# Patient Record
Sex: FEMALE | Race: WHITE | Employment: OTHER | URBAN - METROPOLITAN AREA
[De-identification: names, ages, dates, MRNs, and addresses within clinical notes are randomized per-mention and may not be internally consistent; named-entity substitution may affect disease eponyms.]

---

## 2017-03-06 PROBLEM — K86.89 PANCREATIC MASS: Status: ACTIVE | Noted: 2017-03-06

## 2017-03-23 PROCEDURE — 85025 COMPLETE CBC W/AUTO DIFF WBC: CPT | Performed by: INTERNAL MEDICINE

## 2017-03-23 PROCEDURE — 36415 COLL VENOUS BLD VENIPUNCTURE: CPT | Performed by: INTERNAL MEDICINE

## 2017-03-23 PROCEDURE — 80053 COMPREHEN METABOLIC PANEL: CPT | Performed by: INTERNAL MEDICINE

## 2017-03-23 PROCEDURE — 84443 ASSAY THYROID STIM HORMONE: CPT | Performed by: INTERNAL MEDICINE

## 2017-03-26 ENCOUNTER — ANESTHESIA EVENT (OUTPATIENT)
Dept: ENDOSCOPY | Facility: HOSPITAL | Age: 72
End: 2017-03-26
Payer: MEDICARE

## 2017-03-27 ENCOUNTER — ANESTHESIA (OUTPATIENT)
Dept: ENDOSCOPY | Facility: HOSPITAL | Age: 72
End: 2017-03-27
Payer: MEDICARE

## 2017-03-27 ENCOUNTER — SURGERY (OUTPATIENT)
Age: 72
End: 2017-03-27

## 2017-03-27 ENCOUNTER — HOSPITAL ENCOUNTER (OUTPATIENT)
Facility: HOSPITAL | Age: 72
Setting detail: HOSPITAL OUTPATIENT SURGERY
Discharge: HOME | End: 2017-03-27
Attending: INTERNAL MEDICINE | Admitting: INTERNAL MEDICINE
Payer: MEDICARE

## 2017-03-27 VITALS
RESPIRATION RATE: 16 BRPM | WEIGHT: 191 LBS | HEART RATE: 61 BPM | DIASTOLIC BLOOD PRESSURE: 62 MMHG | SYSTOLIC BLOOD PRESSURE: 99 MMHG | HEIGHT: 66.5 IN | TEMPERATURE: 98 F | OXYGEN SATURATION: 96 % | BODY MASS INDEX: 30.34 KG/M2

## 2017-03-27 DIAGNOSIS — K86.9 PANCREATIC LESION: ICD-10-CM

## 2017-03-27 PROCEDURE — 88305 TISSUE EXAM BY PATHOLOGIST: CPT | Performed by: INTERNAL MEDICINE

## 2017-03-27 PROCEDURE — 88177 CYTP FNA EVAL EA ADDL: CPT | Performed by: INTERNAL MEDICINE

## 2017-03-27 PROCEDURE — 88172 CYTP DX EVAL FNA 1ST EA SITE: CPT | Performed by: INTERNAL MEDICINE

## 2017-03-27 PROCEDURE — BD47ZZZ ULTRASONOGRAPHY OF GASTROINTESTINAL TRACT: ICD-10-PCS | Performed by: INTERNAL MEDICINE

## 2017-03-27 PROCEDURE — 88173 CYTOPATH EVAL FNA REPORT: CPT | Performed by: INTERNAL MEDICINE

## 2017-03-27 PROCEDURE — 0F9G4ZX DRAINAGE OF PANCREAS, PERCUTANEOUS ENDOSCOPIC APPROACH, DIAGNOSTIC: ICD-10-PCS | Performed by: INTERNAL MEDICINE

## 2017-03-27 RX ORDER — SODIUM CHLORIDE, SODIUM LACTATE, POTASSIUM CHLORIDE, CALCIUM CHLORIDE 600; 310; 30; 20 MG/100ML; MG/100ML; MG/100ML; MG/100ML
INJECTION, SOLUTION INTRAVENOUS CONTINUOUS
Status: DISCONTINUED | OUTPATIENT
Start: 2017-03-27 | End: 2017-03-27

## 2017-03-27 NOTE — ANESTHESIA PREPROCEDURE EVALUATION
PRE-OP EVALUATION    Patient Name: Kush Graves    Pre-op Diagnosis: Pancreatic lesion [K86.9]    Procedure(s):  ENDOSCOPIC ULTRASOUND WITH FINE NEEDLE ASPIRATION    Surgeon(s) and Role:     Daniele Sarkar MD - Primary    Pre-op vitals reviewed. HCT 42.5 03/23/2017   MCV 87.6 03/23/2017   MCH 28.7 03/23/2017   MCHC 32.7 03/23/2017   RDW 13.7 03/23/2017   .0 03/23/2017       Lab Results  Component Value Date    03/23/2017   K 4.2 03/23/2017    03/23/2017   CO2 25.0 03/23/2017

## 2017-03-27 NOTE — BRIEF OP NOTE
[facility] ENDOSCOPY  Brief Op Note     Rimma Jamse Location: OR   CSN [id number] MRN [id number]   Admission Date 3/27/2017 Operation Date 3/27/2017   Attending Physician Shirley Bates MD Operating Physician Carole Molina MD       Pre-Operativ

## 2017-03-27 NOTE — ANESTHESIA POSTPROCEDURE EVALUATION
[facility] Patient Status:  Hospital Outpatient Surgery   Age/Gender 73year old female MRN [id number]   Location [address] [place] [address]. Attending Tyron Hinds MD   Hosp Day # 0 PCP Dayana Winters MD       Anesthesia Post-op

## 2017-03-28 NOTE — OPERATIVE REPORT
[facility]    PATIENT'S NAME: Jessica Arriaga   ATTENDING PHYSICIAN: Rianna Lynne M.D. OPERATING PHYSICIAN: Rianna Lynne M.D.    PATIENT ACCOUNT#:   [de-identified]    LOCATION:  [facility] ENDO POOL ROOMS 2 EDWP 10  MEDICAL RECORD #:   [id number] mm in maximum diameter upstream to it. The mass was sampled utilizing the [employer] 22-gauge fine-needle aspiration device with the gastric wall.   Two needle passes were performed, and the preliminary results showed adequate material for histological ex

## 2017-03-29 PROCEDURE — 36415 COLL VENOUS BLD VENIPUNCTURE: CPT | Performed by: INTERNAL MEDICINE

## 2017-03-29 PROCEDURE — 82378 CARCINOEMBRYONIC ANTIGEN: CPT | Performed by: INTERNAL MEDICINE

## 2017-03-29 PROCEDURE — 86301 IMMUNOASSAY TUMOR CA 19-9: CPT | Performed by: INTERNAL MEDICINE

## 2017-04-10 PROBLEM — C25.1 MALIGNANT NEOPLASM OF BODY OF PANCREAS (HCC): Status: ACTIVE | Noted: 2017-04-10

## 2017-04-10 PROCEDURE — 36415 COLL VENOUS BLD VENIPUNCTURE: CPT | Performed by: INTERNAL MEDICINE

## 2017-04-10 PROCEDURE — 87902 NFCT AGT GNTYP ALYS HEP C: CPT | Performed by: INTERNAL MEDICINE

## 2017-04-10 PROCEDURE — 86803 HEPATITIS C AB TEST: CPT | Performed by: INTERNAL MEDICINE

## 2017-04-10 PROCEDURE — 87522 HEPATITIS C REVRS TRNSCRPJ: CPT | Performed by: INTERNAL MEDICINE

## 2017-04-11 PROBLEM — Z51.11 ENCOUNTER FOR ANTINEOPLASTIC CHEMOTHERAPY: Status: ACTIVE | Noted: 2017-04-11

## 2017-04-13 ENCOUNTER — HOSPITAL (OUTPATIENT)
Dept: OTHER | Age: 72
End: 2017-04-13
Attending: INTERNAL MEDICINE

## 2017-04-13 ENCOUNTER — HOSPITAL (OUTPATIENT)
Dept: OTHER | Age: 72
End: 2017-04-13
Attending: SURGERY

## 2017-04-13 LAB
ANALYZER ANC (IANC): NORMAL
ANION GAP SERPL CALC-SCNC: 15 MMOL/L (ref 10–20)
BASOPHILS # BLD: 0 THOUSAND/MCL (ref 0–0.3)
BASOPHILS NFR BLD: 0 %
BUN SERPL-MCNC: 15 MG/DL (ref 6–20)
BUN/CREAT SERPL: 26 (ref 7–25)
CALCIUM SERPL-MCNC: 9.1 MG/DL (ref 8.4–10.2)
CHLORIDE: 105 MMOL/L (ref 98–107)
CO2 SERPL-SCNC: 23 MMOL/L (ref 21–32)
CREAT SERPL-MCNC: 0.58 MG/DL (ref 0.51–0.95)
DIFFERENTIAL METHOD BLD: NORMAL
EOSINOPHIL # BLD: 0.2 THOUSAND/MCL (ref 0.1–0.5)
EOSINOPHIL NFR BLD: 2 %
ERYTHROCYTE [DISTWIDTH] IN BLOOD: 13.7 % (ref 11–15)
GLUCOSE BLDC GLUCOMTR-MCNC: 121 MG/DL (ref 65–99)
GLUCOSE SERPL-MCNC: 153 MG/DL (ref 65–99)
HEMATOCRIT: 41.2 % (ref 36–46.5)
HGB BLD-MCNC: 13.8 GM/DL (ref 12–15.5)
LYMPHOCYTES # BLD: 2.1 THOUSAND/MCL (ref 1–4)
LYMPHOCYTES NFR BLD: 21 %
MCH RBC QN AUTO: 28.8 PG (ref 26–34)
MCHC RBC AUTO-ENTMCNC: 33.5 GM/DL (ref 32–36.5)
MCV RBC AUTO: 85.8 FL (ref 78–100)
MONOCYTES # BLD: 0.8 THOUSAND/MCL (ref 0.3–0.9)
MONOCYTES NFR BLD: 8 %
NEUTROPHILS # BLD: 7.1 THOUSAND/MCL (ref 1.8–7.7)
NEUTROPHILS NFR BLD: 69 %
NEUTS SEG NFR BLD: NORMAL %
PERCENT NRBC: NORMAL
PLATELET # BLD: 174 THOUSAND/MCL (ref 140–450)
POTASSIUM SERPL-SCNC: 4.4 MMOL/L (ref 3.4–5.1)
RBC # BLD: 4.8 MILLION/MCL (ref 4–5.2)
SODIUM SERPL-SCNC: 139 MMOL/L (ref 135–145)
TROPONIN I SERPL HS-MCNC: 0.04 NG/ML
WBC # BLD: 10.2 THOUSAND/MCL (ref 4.2–11)

## 2017-04-14 LAB
ALBUMIN SERPL-MCNC: 3.7 GM/DL (ref 3.6–5.1)
ALP SERPL-CCNC: 68 UNIT/L (ref 45–117)
ALT SERPL-CCNC: 45 UNIT/L
AST SERPL-CCNC: 34 UNIT/L
BILIRUB CONJ SERPL-MCNC: 0.2 MG/DL (ref 0–0.2)
BILIRUB SERPL-MCNC: 0.8 MG/DL (ref 0.2–1)
PROT SERPL-MCNC: 7.1 GM/DL (ref 6.4–8.2)

## 2017-04-15 ENCOUNTER — DIAGNOSTIC TRANS (OUTPATIENT)
Dept: OTHER | Age: 72
End: 2017-04-15

## 2017-04-17 ENCOUNTER — NURSE NAVIGATOR ENCOUNTER (OUTPATIENT)
Dept: HEMATOLOGY/ONCOLOGY | Facility: HOSPITAL | Age: 72
End: 2017-04-17

## 2017-04-17 NOTE — PROGRESS NOTES
[place] called  [facility] this morning requesting consult with Dr Rajan Luther and transfer of care for newly diagnosed pancreatic cancer.   Met with [place] to review medical records and introduced myself as Nurse Navigator, explaining my rol

## 2017-04-19 ENCOUNTER — OFFICE VISIT (OUTPATIENT)
Dept: HEMATOLOGY/ONCOLOGY | Facility: HOSPITAL | Age: 72
End: 2017-04-19
Attending: INTERNAL MEDICINE
Payer: MEDICARE

## 2017-04-19 VITALS
DIASTOLIC BLOOD PRESSURE: 81 MMHG | WEIGHT: 189 LBS | HEIGHT: 66.5 IN | SYSTOLIC BLOOD PRESSURE: 121 MMHG | RESPIRATION RATE: 18 BRPM | HEART RATE: 101 BPM | BODY MASS INDEX: 30.02 KG/M2 | TEMPERATURE: 98 F

## 2017-04-19 DIAGNOSIS — C25.1 MALIGNANT NEOPLASM OF BODY OF PANCREAS (HCC): Primary | ICD-10-CM

## 2017-04-19 PROBLEM — Z51.11 CHEMOTHERAPY MANAGEMENT, ENCOUNTER FOR: Status: ACTIVE | Noted: 2017-04-19

## 2017-04-19 PROCEDURE — G0463 HOSPITAL OUTPT CLINIC VISIT: HCPCS | Performed by: INTERNAL MEDICINE

## 2017-04-19 PROCEDURE — 99205 OFFICE O/P NEW HI 60 MIN: CPT | Performed by: INTERNAL MEDICINE

## 2017-04-19 RX ORDER — FLUTICASONE PROPIONATE 50 MCG
SPRAY, SUSPENSION (ML) NASAL DAILY
COMMUNITY

## 2017-04-19 NOTE — PROGRESS NOTES
[facility] Consultation Note    Patient Name: Gera Diane   YOB: 1945   Medical Record Number: [id number]   CSN: [id number]   Consulting Physician: Juju Maddox MD  Referring Physician(s): Ravindra Alexandra  Date of Consultation: 4/19/2 pulmonary disease) ([facility] [address]      no O2   • PONV (postoperative nausea and vomiting)    • Hepatitis C    • Ill-defined condition      Lipedema; hormone dependent, treated with liposuction       Past Surgical History:      Past Surgical History    IMPLANT LEFT MG Mouth/Throat Gum Take 2 mg by mouth as needed for Smoking cessation. Disp:  Rfl:    mirtazapine 15 MG Oral Tab Take 1 tablet (15 mg total) by mouth nightly.  Disp: 90 tablet Rfl: 0   Prochlorperazine Maleate (COMPAZINE) 10 mg tablet Take 1 tablet (10 mg Resp 18  Ht 1.689 m (5' 6.5\")  Wt 85.73 kg (189 lb)  BMI 30.05 kg/m2    Physical Examination:    General: Patient is alert and oriented x 3, not in acute distress. Psych:  Calm, cooperative, appropriate questions and responses  HEENT: EOMs intact.  Adrien Cousin Chest/Abdomen/Pelvis 3/22/17  IMPRESSION:  1. No demonstration of intrathoracic metastatic disease. 2. Centrilobular emphysema. 3. Hypoattenuating mass of the pancreatic body is suspect for a neoplasm.  The pancreatic tail is  atrophic and there is mild d with neoadjuvant FOLIFINOX given every 2 weeks for a total of 4 cycles prior to restaging CT scan and hopefully surgery; followed by an additional 4 cycles of FOLFIRINOX in the adjuvant setting  -- Will plan to provide Neulasta support in light of triple c 50% of that time was spent counseling the patient and/or on coordination of care. The diagnosis, prognosis, and general treatment was explained to the patient and the family.     Josie Coto MD  [facility]  Abhi Garcia

## 2017-04-19 NOTE — PROGRESS NOTES
Initial visit. Here for treatment planning discussion. Resources from [facility] provided. Emotional support rendered. Phone list with services explained.

## 2017-04-20 ENCOUNTER — OFFICE VISIT (OUTPATIENT)
Dept: HEMATOLOGY/ONCOLOGY | Facility: HOSPITAL | Age: 72
End: 2017-04-20
Attending: PHYSICIAN ASSISTANT
Payer: MEDICARE

## 2017-04-20 DIAGNOSIS — C25.1 MALIGNANT NEOPLASM OF BODY OF PANCREAS (HCC): Primary | ICD-10-CM

## 2017-04-20 PROCEDURE — 99215 OFFICE O/P EST HI 40 MIN: CPT | Performed by: PHYSICIAN ASSISTANT

## 2017-04-20 PROCEDURE — G0463 HOSPITAL OUTPT CLINIC VISIT: HCPCS | Performed by: PHYSICIAN ASSISTANT

## 2017-04-21 NOTE — PROGRESS NOTES
Chemotherapy Education    Learner:  Patient and Family Member    Barriers / Limitations:  None    Chemotherapy education goals:  · Learn the drug names  · Administration schedule  · Routes of administration  · Treatment setting: if treatment is received in Achieved    Comments:  Discussed Neulasta and potential for bone pain. Tylenol prn and if pain uncontrolled, contact clinic.      Treatment Effects on Cardiovascular System:    Overall cardiotoxicity risk score is 0 (very low); based on this cardiac risk Shows understanding     Comments:      Patient Education Acknowledgement:  Patient states understanding of medications, treatment schedule, side effects, and self-help measures.  Patient understands that any treatment received at a facility other than the

## 2017-04-24 ENCOUNTER — OFFICE VISIT (OUTPATIENT)
Dept: HEMATOLOGY/ONCOLOGY | Facility: HOSPITAL | Age: 72
End: 2017-04-24
Attending: INTERNAL MEDICINE
Payer: MEDICARE

## 2017-04-24 VITALS
RESPIRATION RATE: 16 BRPM | SYSTOLIC BLOOD PRESSURE: 145 MMHG | HEART RATE: 103 BPM | TEMPERATURE: 98 F | DIASTOLIC BLOOD PRESSURE: 80 MMHG

## 2017-04-24 DIAGNOSIS — C25.1 MALIGNANT NEOPLASM OF BODY OF PANCREAS (HCC): Primary | ICD-10-CM

## 2017-04-24 DIAGNOSIS — Z51.11 CHEMOTHERAPY MANAGEMENT, ENCOUNTER FOR: ICD-10-CM

## 2017-04-24 PROCEDURE — 96368 THER/DIAG CONCURRENT INF: CPT

## 2017-04-24 PROCEDURE — 36593 DECLOT VASCULAR DEVICE: CPT

## 2017-04-24 PROCEDURE — 96413 CHEMO IV INFUSION 1 HR: CPT

## 2017-04-24 PROCEDURE — 96417 CHEMO IV INFUS EACH ADDL SEQ: CPT

## 2017-04-24 PROCEDURE — 96372 THER/PROPH/DIAG INJ SC/IM: CPT

## 2017-04-24 PROCEDURE — 96415 CHEMO IV INFUSION ADDL HR: CPT

## 2017-04-24 PROCEDURE — 96367 TX/PROPH/DG ADDL SEQ IV INF: CPT

## 2017-04-24 PROCEDURE — 96416 CHEMO PROLONG INFUSE W/PUMP: CPT

## 2017-04-24 PROCEDURE — 96411 CHEMO IV PUSH ADDL DRUG: CPT

## 2017-04-24 RX ORDER — ATROPINE SULFATE 0.4 MG/ML
AMPUL (ML) INJECTION
Status: COMPLETED
Start: 2017-04-24 | End: 2017-04-24

## 2017-04-24 RX ORDER — FLUOROURACIL 50 MG/ML
2400 INJECTION, SOLUTION INTRAVENOUS CONTINUOUS
Status: DISCONTINUED | OUTPATIENT
Start: 2017-04-24 | End: 2017-04-24

## 2017-04-24 RX ORDER — ATROPINE SULFATE 0.4 MG/ML
0.2 AMPUL (ML) INJECTION ONCE
Status: COMPLETED | OUTPATIENT
Start: 2017-04-24 | End: 2017-04-24

## 2017-04-24 RX ORDER — FLUOROURACIL 50 MG/ML
400 INJECTION, SOLUTION INTRAVENOUS ONCE
Status: COMPLETED | OUTPATIENT
Start: 2017-04-24 | End: 2017-04-24

## 2017-04-24 RX ORDER — 0.9 % SODIUM CHLORIDE 0.9 %
VIAL (ML) INJECTION
Status: DISCONTINUED
Start: 2017-04-24 | End: 2017-04-24

## 2017-04-24 RX ORDER — SODIUM CHLORIDE 9 MG/ML
INJECTION, SOLUTION INTRAVENOUS
Status: DISCONTINUED
Start: 2017-04-24 | End: 2017-04-24

## 2017-04-24 RX ORDER — DEXTROSE MONOHYDRATE 50 MG/ML
INJECTION, SOLUTION INTRAVENOUS
Status: DISCONTINUED
Start: 2017-04-24 | End: 2017-04-24

## 2017-04-24 RX ADMIN — ATROPINE SULFATE 0.2 MG: 0.4 MG/ML AMPUL (ML) INJECTION at 13:48:00

## 2017-04-24 RX ADMIN — FLUOROURACIL 800 MG: 50 INJECTION, SOLUTION INTRAVENOUS at 16:27:00

## 2017-04-24 RX ADMIN — FLUOROURACIL 4700 MG: 50 INJECTION, SOLUTION INTRAVENOUS at 16:34:00

## 2017-04-24 NOTE — PROGRESS NOTES
[place] to infusion today for [employer]. She arrives alert and independent, she is accompanied by her sister. Patient without complaints, denies fevers or flu-like symptoms.   Reviewed with patient all medications- their purpose, schedule/timing, an wayne. Patient also stating she feels drowsy and that her 'tongue feels thick\". Patient denies trouble swallowing, chest discomfort, or trouble breathing. Patient states it went away pretty quickly, prior to 5fu being given.   Patient watched Jimmy Tay progress    Education Record    Learner:  Patient and Family Member    Disease / Diagnosis:    Barriers / Limitations:  Fatigue and Drowsy from medication   Comments:    Method:  Discussion, Printed material and Video   Comments:    General Topics:  Infect

## 2017-04-24 NOTE — PATIENT INSTRUCTIONS
Safe Handling of Chemotherapy  While you or your family member is receiving chemotherapy, whether in the clinic or at home, the following precautions must be taken to lessen any exposure to the medication.      Safety and Handling of Chemotherapy  Home Intr possible that traces of the oral chemotherapy may be present in vaginal fluid or semen for 48 hours after taking. A condom should be used during this time.   Effective birth control should be used throughout treatment to prevent pregnancy while on these me

## 2017-04-26 ENCOUNTER — OFFICE VISIT (OUTPATIENT)
Dept: HEMATOLOGY/ONCOLOGY | Facility: HOSPITAL | Age: 72
End: 2017-04-26
Attending: PHYSICIAN ASSISTANT
Payer: MEDICARE

## 2017-04-26 ENCOUNTER — OFFICE VISIT (OUTPATIENT)
Dept: HEMATOLOGY/ONCOLOGY | Facility: HOSPITAL | Age: 72
End: 2017-04-26
Attending: INTERNAL MEDICINE
Payer: MEDICARE

## 2017-04-26 VITALS — DIASTOLIC BLOOD PRESSURE: 75 MMHG | SYSTOLIC BLOOD PRESSURE: 145 MMHG | HEART RATE: 95 BPM

## 2017-04-26 VITALS
RESPIRATION RATE: 18 BRPM | DIASTOLIC BLOOD PRESSURE: 67 MMHG | HEART RATE: 98 BPM | TEMPERATURE: 97 F | SYSTOLIC BLOOD PRESSURE: 124 MMHG

## 2017-04-26 DIAGNOSIS — C25.1 MALIGNANT NEOPLASM OF BODY OF PANCREAS (HCC): ICD-10-CM

## 2017-04-26 DIAGNOSIS — E86.0 DEHYDRATION: ICD-10-CM

## 2017-04-26 DIAGNOSIS — Z51.11 CHEMOTHERAPY MANAGEMENT, ENCOUNTER FOR: ICD-10-CM

## 2017-04-26 DIAGNOSIS — C25.1 MALIGNANT NEOPLASM OF BODY OF PANCREAS (HCC): Primary | ICD-10-CM

## 2017-04-26 DIAGNOSIS — Z51.11 CHEMOTHERAPY MANAGEMENT, ENCOUNTER FOR: Primary | ICD-10-CM

## 2017-04-26 PROCEDURE — G0463 HOSPITAL OUTPT CLINIC VISIT: HCPCS | Performed by: PHYSICIAN ASSISTANT

## 2017-04-26 PROCEDURE — 99213 OFFICE O/P EST LOW 20 MIN: CPT | Performed by: PHYSICIAN ASSISTANT

## 2017-04-26 PROCEDURE — 96372 THER/PROPH/DIAG INJ SC/IM: CPT

## 2017-04-26 PROCEDURE — 96360 HYDRATION IV INFUSION INIT: CPT

## 2017-04-26 RX ORDER — SODIUM CHLORIDE 9 MG/ML
INJECTION, SOLUTION INTRAVENOUS
Status: DISCONTINUED
Start: 2017-04-26 | End: 2017-04-26 | Stop reason: WASHOUT

## 2017-04-26 RX ORDER — 0.9 % SODIUM CHLORIDE 0.9 %
VIAL (ML) INJECTION
Status: DISCONTINUED
Start: 2017-04-26 | End: 2017-04-26

## 2017-04-26 RX ORDER — HEPARIN SODIUM (PORCINE) LOCK FLUSH IV SOLN 100 UNIT/ML 100 UNIT/ML
5 SOLUTION INTRAVENOUS ONCE
Status: COMPLETED | OUTPATIENT
Start: 2017-04-26 | End: 2017-04-26

## 2017-04-26 RX ORDER — SODIUM CHLORIDE 9 MG/ML
INJECTION, SOLUTION INTRAVENOUS
Status: DISCONTINUED
Start: 2017-04-26 | End: 2017-04-26

## 2017-04-26 RX ORDER — HEPARIN SODIUM (PORCINE) LOCK FLUSH IV SOLN 100 UNIT/ML 100 UNIT/ML
SOLUTION INTRAVENOUS
Status: COMPLETED
Start: 2017-04-26 | End: 2017-04-26

## 2017-04-26 RX ADMIN — HEPARIN SODIUM (PORCINE) LOCK FLUSH IV SOLN 100 UNIT/ML 500 UNITS: 100 SOLUTION INTRAVENOUS at 16:23:00

## 2017-04-26 NOTE — PROGRESS NOTES
Pt came here to disconnect cadd pump. cadd pump still has reservoir volume of 2 ml. Pt c/o dizziness,foggy, sleeping a lot,neuropathy symptoms to hands and throat. pt is orthostatic. Taking compazine q 6hrs. Denied any nausea,stated eating ok.    EMILIO Patel

## 2017-04-26 NOTE — PROGRESS NOTES
Patient arrives for add on hydration. Patient had FOLFIRINOX on Monday April 24, 2017. Patient states since having chemotherapy she has been exhausted, reports she was only awake for a few hours yesterday.  Patient states when she woke up she would eat and

## 2017-04-27 ENCOUNTER — HOSPITAL ENCOUNTER (INPATIENT)
Facility: HOSPITAL | Age: 72
LOS: 1 days | Discharge: HOME | DRG: 809 | End: 2017-04-28
Attending: EMERGENCY MEDICINE | Admitting: INTERNAL MEDICINE
Payer: MEDICARE

## 2017-04-27 DIAGNOSIS — C25.9 MALIGNANT NEOPLASM OF PANCREAS, UNSPECIFIED LOCATION OF MALIGNANCY (HCC): ICD-10-CM

## 2017-04-27 DIAGNOSIS — J02.9 SORE THROAT: ICD-10-CM

## 2017-04-27 DIAGNOSIS — K62.5 RECTAL BLEEDING: Primary | ICD-10-CM

## 2017-04-27 PROBLEM — E86.0 DEHYDRATION: Status: ACTIVE | Noted: 2017-04-27

## 2017-04-27 LAB
ALBUMIN SERPL BCP-MCNC: 3.6 G/DL (ref 3.5–4.8)
ALP SERPL-CCNC: 62 U/L (ref 32–100)
ALT SERPL-CCNC: 23 U/L (ref 14–54)
ANION GAP SERPL CALC-SCNC: 10 MMOL/L (ref 0–18)
APTT PPP: 21.6 SECONDS (ref 23.2–35.3)
AST SERPL-CCNC: 22 U/L (ref 15–41)
BASOPHILS # BLD: 0.1 K/UL (ref 0–0.2)
BASOPHILS NFR BLD: 0 %
BILIRUB DIRECT SERPL-MCNC: 0.2 MG/DL (ref 0–0.2)
BILIRUB SERPL-MCNC: 1.2 MG/DL (ref 0.3–1.2)
BILIRUB UR QL: NEGATIVE
BUN SERPL-MCNC: 14 MG/DL (ref 8–20)
BUN/CREAT SERPL: 17.9 (ref 10–20)
CALCIUM SERPL-MCNC: 8.9 MG/DL (ref 8.5–10.5)
CHLORIDE SERPL-SCNC: 104 MMOL/L (ref 95–110)
CLARITY UR: CLEAR
CO2 SERPL-SCNC: 22 MMOL/L (ref 22–32)
COLOR UR: YELLOW
CREAT SERPL-MCNC: 0.78 MG/DL (ref 0.5–1.5)
EOSINOPHIL # BLD: 0 K/UL (ref 0–0.7)
EOSINOPHIL NFR BLD: 0 %
ERYTHROCYTE [DISTWIDTH] IN BLOOD BY AUTOMATED COUNT: 13.4 % (ref 11–15)
GLUCOSE SERPL-MCNC: 119 MG/DL (ref 70–99)
GLUCOSE UR-MCNC: NEGATIVE MG/DL
HCT VFR BLD AUTO: 39.6 % (ref 35–48)
HGB BLD-MCNC: 13.1 G/DL (ref 12–16)
INR BLD: 1 (ref 0.9–1.2)
KETONES UR-MCNC: NEGATIVE MG/DL
LYMPHOCYTES # BLD: 1.9 K/UL (ref 1–4)
LYMPHOCYTES NFR BLD: 8 %
MCH RBC QN AUTO: 28.2 PG (ref 27–32)
MCHC RBC AUTO-ENTMCNC: 33 G/DL (ref 32–37)
MCV RBC AUTO: 85.5 FL (ref 80–100)
MONOCYTES # BLD: 0.2 K/UL (ref 0–1)
MONOCYTES NFR BLD: 1 %
NEUTROPHILS # BLD AUTO: 23 K/UL (ref 1.8–7.7)
NEUTROPHILS NFR BLD: 91 %
NITRITE UR QL STRIP.AUTO: NEGATIVE
OSMOLALITY UR CALC.SUM OF ELEC: 284 MOSM/KG (ref 275–295)
PH UR: 6 [PH] (ref 5–8)
PLATELET # BLD AUTO: 129 K/UL (ref 140–400)
PMV BLD AUTO: 10.3 FL (ref 7.4–10.3)
POTASSIUM SERPL-SCNC: 3.7 MMOL/L (ref 3.3–5.1)
PROT SERPL-MCNC: 6.2 G/DL (ref 5.9–8.4)
PROT UR-MCNC: NEGATIVE MG/DL
PROTHROMBIN TIME: 13 SECONDS (ref 11.8–14.5)
RBC # BLD AUTO: 4.64 M/UL (ref 3.7–5.4)
RBC #/AREA URNS AUTO: 1 /HPF
SODIUM SERPL-SCNC: 136 MMOL/L (ref 136–144)
SP GR UR STRIP: 1.01 (ref 1–1.03)
UROBILINOGEN UR STRIP-ACNC: <2
VIT C UR-MCNC: NEGATIVE MG/DL
WBC # BLD AUTO: 25.3 K/UL (ref 4–11)
WBC #/AREA URNS AUTO: 2 /HPF

## 2017-04-27 NOTE — PROGRESS NOTES
HPI 73year old female presents today for C1D3 FOLFIRINOX. She reports to the nurse who is removing her CADD pump that she has slept much more over the last 48 hours. She denies f/s/c/n/v/d/c. She denies abdominal pain.   She does admit to oral hyperes tablet Rfl: 3   Ondansetron HCl (ZOFRAN) 8 MG tablet Take 1 tablet (8 mg total) by mouth every 8 (eight) hours as needed for Nausea.  Disp: 20 tablet Rfl: 3   dexamethasone (DECADRON) 4 MG tablet Take 2 tabs with food in AM for 2 days following chemo, then alone in [place], [place]. 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 She enjoys dining out, swimming, enjoys yoga, spending time with friends.  [place] previously lived in [facility] full time, but now spends about 3-4 months the with Dr. Angie Hernandez prior to Cycle 2. No orders of the defined types were placed in this encounter. Results From Past 48 Hours:  No results found for this or any previous visit (from the past 48 hour(s)).     Meds This Visit:        Brian Thorpe

## 2017-04-28 VITALS
HEART RATE: 74 BPM | BODY MASS INDEX: 29.7 KG/M2 | RESPIRATION RATE: 18 BRPM | OXYGEN SATURATION: 96 % | SYSTOLIC BLOOD PRESSURE: 121 MMHG | TEMPERATURE: 98 F | HEIGHT: 67 IN | WEIGHT: 189.19 LBS | DIASTOLIC BLOOD PRESSURE: 61 MMHG

## 2017-04-28 PROBLEM — D61.818 PANCYTOPENIA (HCC): Status: ACTIVE | Noted: 2017-04-28

## 2017-04-28 PROBLEM — C25.9 MALIGNANT NEOPLASM OF PANCREAS, UNSPECIFIED LOCATION OF MALIGNANCY (HCC): Status: ACTIVE | Noted: 2017-04-28

## 2017-04-28 PROBLEM — D64.9 ANEMIA: Status: ACTIVE | Noted: 2017-04-28

## 2017-04-28 LAB
ANTIBODY SCREEN: NEGATIVE
BASOPHILS # BLD: 0 K/UL (ref 0–0.2)
BASOPHILS NFR BLD: 0 %
EOSINOPHIL # BLD: 0 K/UL (ref 0–0.7)
EOSINOPHIL NFR BLD: 0 %
ERYTHROCYTE [DISTWIDTH] IN BLOOD BY AUTOMATED COUNT: 13.8 % (ref 11–15)
HCT VFR BLD AUTO: 35.6 % (ref 35–48)
HCT VFR BLD AUTO: 36.7 % (ref 35–48)
HGB BLD-MCNC: 11.7 G/DL (ref 12–16)
HGB BLD-MCNC: 12 G/DL (ref 12–16)
LYMPHOCYTES # BLD: 3.5 K/UL (ref 1–4)
LYMPHOCYTES NFR BLD: 20 %
MCH RBC QN AUTO: 28.3 PG (ref 27–32)
MCHC RBC AUTO-ENTMCNC: 33 G/DL (ref 32–37)
MCV RBC AUTO: 85.8 FL (ref 80–100)
MONOCYTES # BLD: 0 K/UL (ref 0–1)
MONOCYTES NFR BLD: 0 %
NEUTROPHILS # BLD AUTO: 14.1 K/UL (ref 1.8–7.7)
NEUTROPHILS NFR BLD: 77 %
NEUTS BAND NFR BLD: 3 %
PLATELET # BLD AUTO: 108 K/UL (ref 140–400)
PMV BLD AUTO: 10.7 FL (ref 7.4–10.3)
RBC # BLD AUTO: 4.15 M/UL (ref 3.7–5.4)
RH BLOOD TYPE: NEGATIVE
WBC # BLD AUTO: 17.7 K/UL (ref 4–11)

## 2017-04-28 PROCEDURE — 99223 1ST HOSP IP/OBS HIGH 75: CPT | Performed by: INTERNAL MEDICINE

## 2017-04-28 RX ORDER — MIRTAZAPINE 15 MG/1
15 TABLET, FILM COATED ORAL NIGHTLY
Status: DISCONTINUED | OUTPATIENT
Start: 2017-04-28 | End: 2017-04-28

## 2017-04-28 RX ORDER — 0.9 % SODIUM CHLORIDE 0.9 %
VIAL (ML) INJECTION
Status: COMPLETED
Start: 2017-04-28 | End: 2017-04-28

## 2017-04-28 RX ORDER — SODIUM CHLORIDE 9 MG/ML
INJECTION, SOLUTION INTRAVENOUS
Status: COMPLETED
Start: 2017-04-28 | End: 2017-04-28

## 2017-04-28 RX ORDER — HEPARIN SODIUM (PORCINE) LOCK FLUSH IV SOLN 100 UNIT/ML 100 UNIT/ML
SOLUTION INTRAVENOUS
Status: DISCONTINUED
Start: 2017-04-28 | End: 2017-04-28

## 2017-04-28 RX ORDER — 0.9 % SODIUM CHLORIDE 0.9 %
VIAL (ML) INJECTION
Status: DISCONTINUED
Start: 2017-04-28 | End: 2017-04-28

## 2017-04-28 RX ORDER — ONDANSETRON 2 MG/ML
4 INJECTION INTRAMUSCULAR; INTRAVENOUS EVERY 6 HOURS PRN
Status: DISCONTINUED | OUTPATIENT
Start: 2017-04-28 | End: 2017-04-28

## 2017-04-28 RX ORDER — ALPRAZOLAM 1 MG/1
1 TABLET ORAL NIGHTLY PRN
Status: DISCONTINUED | OUTPATIENT
Start: 2017-04-28 | End: 2017-04-28

## 2017-04-28 RX ORDER — SODIUM CHLORIDE 9 MG/ML
INJECTION, SOLUTION INTRAVENOUS CONTINUOUS
Status: DISPENSED | OUTPATIENT
Start: 2017-04-28 | End: 2017-04-28

## 2017-04-28 NOTE — ED PROVIDER NOTES
Patient Seen in: [facility] 4w/sw/se    History   No chief complaint on file.     Stated Complaint: Blood in stool after chemo treatment    HPI    Patient is a 53-year-old female with known pancreatic cancer who just finished her first round of elisa polacrilex (NICORETTE STARTER KIT) 2 MG Mouth/Throat Gum,  Take 2 mg by mouth as needed for Smoking cessation. dexamethasone (DECADRON) 4 MG tablet,  Take 2 tabs with food in AM for 2 days following chemo, then as directed.        Family History   Problem Genitourinary: Rectal exam shows no mass and no tenderness. Guaiac positive stool. Gross blood on rectal exam   Musculoskeletal: Normal range of motion. Neurological: She is alert and oriented to person, place, and time. Skin: Skin is warm and dry. [id number]]                                  Final result                 Please view results for these tests on the individual orders.    BLOOD TYPE, ABO AND RH D   ANTIBODY SCREEN   RAINBOW DRAW BLUE   RAINBOW DRAW GOLD   RAINBOW DRAW LAVENDER   TORRES

## 2017-04-28 NOTE — CONSULTS
Oncology Consultation Note    Patient Name: Eusebia Lima   YOB: 1945   Medical Record Number: [id number]   CSN: [id number]   Consulting Physician: Jacky Morgan MD  Date of Consultation: 4/28/2017     Reason for Consultation:  Rectal blee History:    Social History   Marital Status:    Spouse Name: N/A    Years of Education: N/A  Number of Children: N/A     Occupational History  None on file     Social History Main Topics   Smoking status: Former Smoker  1.00 Packs/Day  For 40.00 Year comprehensive 14 point review of systems was completed. Pertinent positives and negatives noted in the the HPI.      Vital Signs:  /62 mmHg  Pulse 73  Temp(Src) 97.9 °F (36.6 °C) (Oral)  Resp 19  Ht 1.702 m (5' 7\")  Wt 85.821 kg (189 lb 3.2 oz)  BMI neulasta treatment admitted with diarrhea and hematochezia    Plan:    1.) Hematochezia    --Informed pt that her bleeding seems likely related to her diarrhea in the setting of high cell turn over rate in GI tract from chemo causing raw GI tract more pron

## 2017-04-28 NOTE — H&P
[address] [place] [address] Patient Status:  Observation    1945 MRN [id number]   Location [facility] 4W/SW/SE Attending Victorina Rodriguez MD   Hosp Day # 1 PCP Lucian Montanez MD     Date:   route daily. nicotine polacrilex (NICORETTE STARTER KIT) 2 MG Mouth/Throat Gum Take 2 mg by mouth as needed for Smoking cessation. mirtazapine 15 MG Oral Tab Take 1 tablet (15 mg total) by mouth nightly.    Prochlorperazine Maleate (COMPAZINE) 10 mg tab normocephalic, throat is clear, MARY, EOMI, clear conjunctiva   NECK: No masses   LUNGS: CTAB, no wrr   CV: RRR without murmur   ABD: Soft, nontender and not distended   EXTREMITIES: no edema   NEURO: Strength intact; no sensory deficits   RECTAL No extern

## 2017-04-28 NOTE — CONSULTS
[facility] HOSP - [facility]    Report of Consultation    [facility] Patient Status:  Observation    1945 MRN [id number]   Location [facility] 4W/SW/SE Attending Harshad Guy MD   Hosp Day # 1 PCP Ashley Trinidad MD     Date of Adm file.    Other Topics            Concern    None on file    Social History Narrative    Maxi Vines is  for the last 3 yrs; previously  27 yrs. She has no children; close to her stepdaughter in [place], [place].  Her sister, Randy Verdugo, lives in [facility] chemo, then as directed.        Allergies  No Known Allergies    Review of Systems:   GENERAL HEALTH: feels well otherwise, denies fever or weight loss  SKIN: denies any unusual skin lesions or rashes  EYES: no visual complaints or deficits  HEENT: denies m Chest CT 3/22/2017 TECHNIQUE: PA and left lateral views of the chest. FINDINGS:  There is a new left-sided portacatheter with the tip projecting over the cavoatrial junction. No pneumothorax is seen. Bilateral breast prostheses are present.   Heart size and

## 2017-04-28 NOTE — PLAN OF CARE
GASTROINTESTINAL - ADULT    • Minimal or absence of nausea and vomiting Progressing    • Maintains or returns to baseline bowel function Progressing    • Maintains adequate nutritional intake (undernourished) Progressing        Patient/Family Goals    • Pa

## 2017-05-01 ENCOUNTER — NURSE NAVIGATOR ENCOUNTER (OUTPATIENT)
Dept: HEMATOLOGY/ONCOLOGY | Facility: HOSPITAL | Age: 72
End: 2017-05-01

## 2017-05-01 ENCOUNTER — TELEPHONE (OUTPATIENT)
Dept: HEMATOLOGY/ONCOLOGY | Facility: HOSPITAL | Age: 72
End: 2017-05-01

## 2017-05-01 RX ORDER — SUCRALFATE ORAL 1 G/10ML
1 SUSPENSION ORAL
Qty: 420 ML | Refills: 3 | Status: SHIPPED | OUTPATIENT
Start: 2017-05-01 | End: 2017-08-10 | Stop reason: ALTCHOICE

## 2017-05-01 NOTE — PROGRESS NOTES
NN placed call to [place] to follow up on conversation she had with [facility]. [place] reports she has meals being delivered once a week (6 meals) and that she has many frozen and ready for use.   She is finding that she is not up to eating these meals a

## 2017-05-01 NOTE — TELEPHONE ENCOUNTER
Toxicities: C1D1 Folfirinox on 4/24/2017     Dysphagia/Stomach ache    Fever: Grade 0 (Denies c/s/f. )     Dysphagia- Pharyngeal: Grade 2 (Pt reports she still has a sore throat.  She is using cepacol throat drops that are helping.)     Anorexia: Grade 1 (P

## 2017-05-01 NOTE — TELEPHONE ENCOUNTER
Reviewed Triage RN note. Call to patient. Reports feeling weak from chemo though every day a \"little better\".  Shares that she did not realize how bad she was going to be feeling and has reached out to friends and family for assistance but still has no on

## 2017-05-01 NOTE — TELEPHONE ENCOUNTER
[place] called and said she was hospitalized because of severe bloody diarrhea. Since her discharge friday she has still been experiencing mild diarrhea, No blood and her stomach is very sore. She would like some advice.

## 2017-05-02 ENCOUNTER — HOSPITAL ENCOUNTER (INPATIENT)
Facility: HOSPITAL | Age: 72
LOS: 17 days | Discharge: NURSING, REHAB OR LONG-TERM CARE | DRG: 871 | End: 2017-05-19
Attending: INTERNAL MEDICINE | Admitting: INTERNAL MEDICINE
Payer: MEDICARE

## 2017-05-02 ENCOUNTER — OFFICE VISIT (OUTPATIENT)
Dept: HEMATOLOGY/ONCOLOGY | Facility: HOSPITAL | Age: 72
End: 2017-05-02
Attending: INTERNAL MEDICINE
Payer: MEDICARE

## 2017-05-02 ENCOUNTER — OFFICE VISIT (OUTPATIENT)
Dept: HEMATOLOGY/ONCOLOGY | Facility: HOSPITAL | Age: 72
End: 2017-05-02
Attending: PHYSICIAN ASSISTANT
Payer: MEDICARE

## 2017-05-02 ENCOUNTER — TELEPHONE (OUTPATIENT)
Dept: HEMATOLOGY/ONCOLOGY | Facility: HOSPITAL | Age: 72
End: 2017-05-02

## 2017-05-02 ENCOUNTER — APPOINTMENT (OUTPATIENT)
Dept: GENERAL RADIOLOGY | Facility: HOSPITAL | Age: 72
DRG: 871 | End: 2017-05-02
Attending: INTERNAL MEDICINE
Payer: MEDICARE

## 2017-05-02 VITALS
BODY MASS INDEX: 29.22 KG/M2 | RESPIRATION RATE: 18 BRPM | SYSTOLIC BLOOD PRESSURE: 80 MMHG | HEART RATE: 116 BPM | HEIGHT: 66.5 IN | DIASTOLIC BLOOD PRESSURE: 51 MMHG | WEIGHT: 184 LBS

## 2017-05-02 VITALS — SYSTOLIC BLOOD PRESSURE: 114 MMHG | DIASTOLIC BLOOD PRESSURE: 78 MMHG | HEART RATE: 116 BPM

## 2017-05-02 DIAGNOSIS — R11.2 NAUSEA AND VOMITING, INTRACTABILITY OF VOMITING NOT SPECIFIED, UNSPECIFIED VOMITING TYPE: ICD-10-CM

## 2017-05-02 DIAGNOSIS — K12.31 MUCOSITIS DUE TO ANTINEOPLASTIC THERAPY: ICD-10-CM

## 2017-05-02 DIAGNOSIS — R10.30 LOWER ABDOMINAL PAIN: Primary | ICD-10-CM

## 2017-05-02 DIAGNOSIS — C25.1 MALIGNANT NEOPLASM OF BODY OF PANCREAS (HCC): ICD-10-CM

## 2017-05-02 DIAGNOSIS — Z51.11 CHEMOTHERAPY MANAGEMENT, ENCOUNTER FOR: Primary | ICD-10-CM

## 2017-05-02 DIAGNOSIS — E86.0 DEHYDRATION: ICD-10-CM

## 2017-05-02 DIAGNOSIS — C25.1 MALIGNANT NEOPLASM OF BODY OF PANCREAS (HCC): Primary | ICD-10-CM

## 2017-05-02 DIAGNOSIS — D61.818 PANCYTOPENIA (HCC): ICD-10-CM

## 2017-05-02 PROBLEM — E86.1 HYPOVOLEMIA: Status: ACTIVE | Noted: 2017-05-02

## 2017-05-02 PROBLEM — R11.10 VOMITING: Status: ACTIVE | Noted: 2017-05-02

## 2017-05-02 PROBLEM — R19.7 DIARRHEA: Status: ACTIVE | Noted: 2017-05-02

## 2017-05-02 PROCEDURE — 96361 HYDRATE IV INFUSION ADD-ON: CPT

## 2017-05-02 PROCEDURE — 71010 XR CHEST AP PORTABLE  (CPT=71010): CPT

## 2017-05-02 PROCEDURE — 99215 OFFICE O/P EST HI 40 MIN: CPT | Performed by: PHYSICIAN ASSISTANT

## 2017-05-02 PROCEDURE — 85025 COMPLETE CBC W/AUTO DIFF WBC: CPT

## 2017-05-02 PROCEDURE — 80053 COMPREHEN METABOLIC PANEL: CPT

## 2017-05-02 PROCEDURE — G0463 HOSPITAL OUTPT CLINIC VISIT: HCPCS | Performed by: PHYSICIAN ASSISTANT

## 2017-05-02 PROCEDURE — 99222 1ST HOSP IP/OBS MODERATE 55: CPT | Performed by: INTERNAL MEDICINE

## 2017-05-02 PROCEDURE — 96374 THER/PROPH/DIAG INJ IV PUSH: CPT

## 2017-05-02 PROCEDURE — 83735 ASSAY OF MAGNESIUM: CPT

## 2017-05-02 RX ORDER — 0.9 % SODIUM CHLORIDE 0.9 %
VIAL (ML) INJECTION
Status: DISCONTINUED
Start: 2017-05-02 | End: 2017-05-02

## 2017-05-02 RX ORDER — HEPARIN SODIUM (PORCINE) LOCK FLUSH IV SOLN 100 UNIT/ML 100 UNIT/ML
SOLUTION INTRAVENOUS
Status: DISCONTINUED
Start: 2017-05-02 | End: 2017-05-02

## 2017-05-02 RX ORDER — HEPARIN SODIUM (PORCINE) LOCK FLUSH IV SOLN 100 UNIT/ML 100 UNIT/ML
5 SOLUTION INTRAVENOUS ONCE
Status: DISCONTINUED | OUTPATIENT
Start: 2017-05-02 | End: 2017-05-02

## 2017-05-02 RX ORDER — ALPRAZOLAM 1 MG/1
1 TABLET ORAL NIGHTLY PRN
Status: DISCONTINUED | OUTPATIENT
Start: 2017-05-02 | End: 2017-05-19

## 2017-05-02 RX ORDER — SODIUM CHLORIDE 9 MG/ML
INJECTION, SOLUTION INTRAVENOUS
Status: DISCONTINUED
Start: 2017-05-02 | End: 2017-05-02

## 2017-05-02 RX ORDER — AMLODIPINE BESYLATE 5 MG/1
5 TABLET ORAL DAILY
Status: DISCONTINUED | OUTPATIENT
Start: 2017-05-03 | End: 2017-05-03

## 2017-05-02 RX ORDER — SUCRALFATE ORAL 1 G/10ML
1 SUSPENSION ORAL
Status: DISCONTINUED | OUTPATIENT
Start: 2017-05-02 | End: 2017-05-19

## 2017-05-02 RX ORDER — ACETAMINOPHEN 325 MG/1
650 TABLET ORAL EVERY 6 HOURS PRN
Status: DISCONTINUED | OUTPATIENT
Start: 2017-05-02 | End: 2017-05-19

## 2017-05-02 RX ORDER — ONDANSETRON 2 MG/ML
4 INJECTION INTRAMUSCULAR; INTRAVENOUS EVERY 6 HOURS PRN
Status: DISCONTINUED | OUTPATIENT
Start: 2017-05-02 | End: 2017-05-19

## 2017-05-02 RX ORDER — SODIUM CHLORIDE 9 MG/ML
INJECTION, SOLUTION INTRAVENOUS CONTINUOUS
Status: DISCONTINUED | OUTPATIENT
Start: 2017-05-02 | End: 2017-05-06

## 2017-05-02 RX ORDER — MIRTAZAPINE 15 MG/1
15 TABLET, FILM COATED ORAL NIGHTLY
Status: DISCONTINUED | OUTPATIENT
Start: 2017-05-02 | End: 2017-05-19

## 2017-05-02 NOTE — TELEPHONE ENCOUNTER
Toxicities:  C1D1 Folfirinox 4/24/2017    Dysphagia/Nausea/Dehydration/Anorexia/Fatigue      Fever: Grade 0 (Denies c/s/f.  )     Fatigue: Grade 2    Dysphagia- Pharyngeal: Grade 2 (Pt reports she is still having pain in her throat and a burning pain in he

## 2017-05-02 NOTE — PROGRESS NOTES
Report received from [facility]. Patient here for hydration, awaiting bed for admission to the 4th floor. Patient has finished with her hydration. Spoke with JAQUELINE Brooks on 4se, room 451 is being cleaned for the patient. Notified CAIT Raymond.   Received ca

## 2017-05-02 NOTE — TELEPHONE ENCOUNTER
Noted and Dr Kenneth Jackson and Brunilda Elliott, navigator aware. When she comes in we will discuss mobilizing resources for home to be in place to prepare for any further treatments.

## 2017-05-02 NOTE — PROGRESS NOTES
HPI 73year old female presents today with complaints of weakness, odynophagia, dysgeusia, nausea, diarrhea, decreased oral intake and abdominal pain. She had one episode of emesis today after attempting to eat a pancake.   She took one dose of carafate volume. Negative for urgency, frequency, hematuria, flank pain and pelvic pain. Musculoskeletal: Negative for back pain, joint swelling and arthralgias. Skin: Positive for pallor. Negative for rash. Neurological: Positive for weakness.  Negative for d (postoperative nausea and vomiting)    • Hepatitis C    • Ill-defined condition      Lipedema; hormone dependent, treated with liposuction   • Cancer ([facility] [address]      pancreaticCA         Past Surgical History    IMPLANT LEFT  1970s    IMPLANT RIGHT  1970s    CO w/c, vomitus noted periorally   HENT:   Head: Atraumatic.    Right Ear: External ear normal.   Left Ear: External ear normal.   Nose: Nose normal.   Mouth/Throat: Oropharyngeal exudate (Oral mucosa dry and moderately erythematous, pathcy white exudate, jorge luis Result Value Ref Range   Glucose 145 (H) 70-99 mg/dL   Sodium 128 (L) 136-144 mmol/L   Potassium 3.6 3.3-5.1 mmol/L   Chloride 94 (L)  mmol/L   CO2 22 22-32 mmol/L   BUN 12 8-20 mg/dL   Creatinine 0.77 0.50-1.50 mg/dL   Calcium, Total 8.4 (L) 8.5-1

## 2017-05-02 NOTE — TELEPHONE ENCOUNTER
Patient feels she's dehydrated. Difficulty walking burning in stomach and incontinent.  Burning sinsation every where, fercho

## 2017-05-02 NOTE — PROGRESS NOTES
Received patient from [facility] [place]. Patient received chemotherapy on 4/24/17, states after pump was removed she started having bloody diarrhea, states she hospitalized was discharged from hospital on 4/28/17.   Patient states since she has been home s

## 2017-05-03 ENCOUNTER — APPOINTMENT (OUTPATIENT)
Dept: GENERAL RADIOLOGY | Facility: HOSPITAL | Age: 72
DRG: 871 | End: 2017-05-03
Attending: INTERNAL MEDICINE
Payer: MEDICARE

## 2017-05-03 PROBLEM — R78.81 BACTEREMIA DUE TO GRAM-NEGATIVE BACTERIA: Status: ACTIVE | Noted: 2017-05-03

## 2017-05-03 PROBLEM — A41.9 SEPSIS, UNSPECIFIED (HCC): Status: ACTIVE | Noted: 2017-05-03

## 2017-05-03 PROBLEM — R10.30 LOWER ABDOMINAL PAIN: Status: ACTIVE | Noted: 2017-05-03

## 2017-05-03 PROBLEM — A41.9 SEPSIS, UNSPECIFIED: Status: ACTIVE | Noted: 2017-05-03

## 2017-05-03 PROCEDURE — 99232 SBSQ HOSP IP/OBS MODERATE 35: CPT | Performed by: INTERNAL MEDICINE

## 2017-05-03 PROCEDURE — 74020 XR ABDOMEN, OBSTRUCTIVE SERIES (CPT=74020): CPT

## 2017-05-03 RX ORDER — 0.9 % SODIUM CHLORIDE 0.9 %
VIAL (ML) INJECTION
Status: COMPLETED
Start: 2017-05-03 | End: 2017-05-03

## 2017-05-03 RX ORDER — SODIUM CHLORIDE 9 MG/ML
INJECTION, SOLUTION INTRAVENOUS CONTINUOUS
Status: DISCONTINUED | OUTPATIENT
Start: 2017-05-03 | End: 2017-05-06

## 2017-05-03 RX ORDER — POTASSIUM CHLORIDE 29.8 MG/ML
40 INJECTION INTRAVENOUS ONCE
Status: COMPLETED | OUTPATIENT
Start: 2017-05-03 | End: 2017-05-03

## 2017-05-03 RX ORDER — POTASSIUM CHLORIDE 14.9 MG/ML
20 INJECTION INTRAVENOUS ONCE
Status: COMPLETED | OUTPATIENT
Start: 2017-05-03 | End: 2017-05-03

## 2017-05-03 RX ORDER — FLUCONAZOLE 2 MG/ML
200 INJECTION, SOLUTION INTRAVENOUS EVERY 24 HOURS
Status: DISCONTINUED | OUTPATIENT
Start: 2017-05-03 | End: 2017-05-16

## 2017-05-03 RX ORDER — MAGNESIUM SULFATE HEPTAHYDRATE 40 MG/ML
2 INJECTION, SOLUTION INTRAVENOUS ONCE
Status: COMPLETED | OUTPATIENT
Start: 2017-05-03 | End: 2017-05-03

## 2017-05-03 NOTE — PROGRESS NOTES
[facility] dosing service    Initial Pharmacokinetic Consult for Vancomycin Dosing     David Silver is a 73year old female with h/o pancreatic CA who is being treated for neutropenic fever. Pharmacy has been asked to dose Vancomycin by Dr. Gillian Cline. follow her. We appreciate the opportunity to assist in her care.     Leah Seo, PharmD  5/2/2017  9:41 PM  [address]: [phone]

## 2017-05-03 NOTE — H&P
[address] [place] [address] Patient Status:  Inpatient    1945 MRN [id number]   Location [facility] 4W/SW/SE Attending Mallika Montanez MD   Hosp Day # 1 PCP Dale Tom MD     Date:  5/3/2017 Allergies    Prescriptions prior to admission:  sucralfate (CARAFATE) 1 GM/10ML Oral Suspension Take 10 mL (1 g total) by mouth 4 (four) times daily before meals and nightly.    AMLODIPINE BESYLATE 5 MG Oral Tab TAKE 1 TABLET ONE TIME DAILY   Jon Feliz °C) (Oral)  Resp 18  SpO2 96%     GENERAL: Tired, fatigued   SKIN: no rashes   HEENT: Atraumatic, normocephalic, throat is clear, MARY, EOMI, clear conjunctiva   NECK: No masses   LUNGS: CTAB, no wrr   CV: RRR without murmur   ABD: +ttp over the lower quad normal  - cont IV fluids  - sepsis protocol ordered  - abdominal xray to rule out obstruction  - cont boost for nutrition.  Consider nutrition consult  - hold home BP meds  - oncology on consult      Janelle Allen MD  5/3/2017

## 2017-05-03 NOTE — CONSULTS
Oncology Consultation Note    Patient Name: Eusebia Lima   YOB: 1945   Medical Record Number: [id number]   CSN: [id number]   Consulting Physician: Jacky Morgan MD  Referring Physician(s): Alicia Car MD  Date of Consultation: 5/2/2017 Main Topics   Smoking status: Former Smoker  1.00 Packs/Day  For 40.00 Years     Quit date: 02/01/2017    Smokeless tobacco: Not on file    Alcohol Use: Yes  0.0 oz/week    0 Standard drinks or equivalent per week         Comment: one per month    Drug Use 94%    Physical Examination:    General: Patient is alert and oriented x 3, generalized weakness noted  Psych:  Cooperative with appropriate questions and responses  HEENT: EOMs intact. Oropharynx is clear. Neck:  No palpable lymphadenopathy.  Neck is sup counts; day 8 from treatment    Plan:    --Patient is neutropenic, but not febrile.  Agree with plans for empiric antibiotic coverage; would not plan to treat with G-CSF booster agents as she received neulasta on day 3 of treatment  --Counts currently at th

## 2017-05-03 NOTE — PROGRESS NOTES
[facility] Dosing Service  Antibiotic Dosing    Rimma James is a 73year old female with h/o pancreatic CA for whom pharmacy is dosing piperacillin/tazobactam for treatment of neutropenia. Pharmacy to dose consult requested by Dr. Bruce Cortez.     Allergie

## 2017-05-03 NOTE — PROGRESS NOTES
[facility]  Hematology/Oncology  Progress Note    Adlila August Patient Status:  Inpatient    1945 MRN [id number]   Location [facility] 4W/SW/SE Attending Harriet Lopez MD   Hosp Day # 1 PCP Hunter Rivera MD     Subjective: Hypovolemia  Impression:  72 yo F with resectable pancreatic cancer with local regional lymph node involvement [id number], stage IIB s/p cycle 1 of FOLFIRINOX on 4/24/17 with neulasta given on day 3 presents with failure to thrive at zoltan in blood counts; da

## 2017-05-03 NOTE — PLAN OF CARE
DISCHARGE PLANNING    • Discharge to home or other facility with appropriate resources Not Progressing        RISK FOR INFECTION - ADULT    • Absence of fever/infection during anticipated neutropenic period Not Progressing        SAFETY ADULT - FALL    • F

## 2017-05-03 NOTE — CDS QUERY
Clinical Significance – Hematology Results  Della Calabrese  Dear Doctor:  Clinical information (provided below) includes documentation of hematology results that are less than the normal range.  For accurate ICD-10-CM code assignm

## 2017-05-03 NOTE — PLAN OF CARE
DISCHARGE PLANNING    • Discharge to home or other facility with appropriate resources Not Progressing        GASTROINTESTINAL - ADULT    • Minimal or absence of nausea and vomiting Not Progressing    • Maintains or returns to baseline bowel function Not P

## 2017-05-03 NOTE — CONSULTS
[facility] AND [facility] Infectious Disease  Report of Consultation    [place] Patient Status:  Inpatient    1945 MRN [id number]   Location [facility] 4W/SW/SE Attending Mariann Alford MD   Hosp Day # 1 PCP Eileen Irizarry MD She does not have any smokeless tobacco history on file. She reports that she drinks alcohol. She reports that she uses illicit drugs (Cannabis).     Allergies:  No Known Allergies    Medications:    Current facility-administered medications:   •  0.9%  NaC myalgias, arthralgias, muscle weakness. Neurological: No focal neurologic deficits, seizures, tremors. Psych:  No h/o anxiety, depression, other psych d/o. Endocrine: No history of of diabetes, thyroid disorder.     Remainder of 12 point review of sys pending    Assessment and Plan:    1.  Acute gram negative sepsis with  vs. Occult GI foci considered  - Active urine sediment without significant pyuria due to neutropenia  - 1/2 blood cultures 5/2 positive for GNRs, repeats pending  - If further blood c

## 2017-05-04 ENCOUNTER — TELEPHONE (OUTPATIENT)
Dept: HEMATOLOGY/ONCOLOGY | Facility: HOSPITAL | Age: 72
End: 2017-05-04

## 2017-05-04 ENCOUNTER — APPOINTMENT (OUTPATIENT)
Dept: CT IMAGING | Facility: HOSPITAL | Age: 72
DRG: 871 | End: 2017-05-04
Attending: INTERNAL MEDICINE
Payer: MEDICARE

## 2017-05-04 PROCEDURE — 74177 CT ABD & PELVIS W/CONTRAST: CPT | Performed by: INTERNAL MEDICINE

## 2017-05-04 PROCEDURE — 99233 SBSQ HOSP IP/OBS HIGH 50: CPT | Performed by: INTERNAL MEDICINE

## 2017-05-04 RX ORDER — POTASSIUM CHLORIDE 14.9 MG/ML
20 INJECTION INTRAVENOUS ONCE
Status: COMPLETED | OUTPATIENT
Start: 2017-05-04 | End: 2017-05-04

## 2017-05-04 RX ORDER — POTASSIUM CHLORIDE 29.8 MG/ML
40 INJECTION INTRAVENOUS ONCE
Status: COMPLETED | OUTPATIENT
Start: 2017-05-04 | End: 2017-05-04

## 2017-05-04 RX ORDER — 0.9 % SODIUM CHLORIDE 0.9 %
VIAL (ML) INJECTION
Status: COMPLETED
Start: 2017-05-04 | End: 2017-05-04

## 2017-05-04 NOTE — DISCHARGE PLANNING
5/4CM-MD orders received in regards to discharge planning and caregiver. The Patient was seen at bedside. The Patient resides alone in   in [place] in a apartment building.  Prior to hospitalization, the Patient wasn't driving and was somewhat independent

## 2017-05-04 NOTE — PROGRESS NOTES
[facility]  Hematology/Oncology  Progress Note    Rimma James Patient Status:  Inpatient    1945 MRN [id number]   Location [facility] 4W/SW/SE Attending Con Mayfield MD   Hosp Day # 2 PCP Johnny Johnson MD     Subjective: institution MRI. 2.  Common bile duct dilatation without significant intrahepatic ductal dilatation.  Findings suggest age-related aeration however correlate for biliary lab abnormality. Cholelithiasis.   3.  Hepatic cysts.  Renal cysts.    4.  Fluid diste with plans for antibiotic coverage; would not plan to treat with G-CSF booster agents as she received neulasta on day 3 of treatment  --WBC counts are starting to rise, but her lowered hgb and plts are signs of her systemic infection  --Consult to social w

## 2017-05-04 NOTE — TELEPHONE ENCOUNTER
carafate denied it is not on [employer]'s preferred list. They need more information. Has she tried sucralfate tablet and it has not worked? You can call [phone] to set up a peer to peer.  Her member ID number is [id number]

## 2017-05-04 NOTE — PROGRESS NOTES
[facility]    Progress Note    Brady Zhang Patient Status:  Inpatient    1945 MRN [id number]   Location [facility] 4W/SW/SE Attending Ty Dan MD   Hosp Day # 2 PCP Dayana Winters MD       Subjective:   Pt rep 05/04/2017   CA 7.3* 05/04/2017   ALB 3.3* 05/02/2017   ALKPHO 50 05/02/2017   BILT 1.8* 05/02/2017   TP 6.4 05/02/2017   AST 17 05/02/2017   ALT 14 05/02/2017   PTT 30.5 05/03/2017   INR 1.6* 05/03/2017   TSH 1.460 03/23/2017   MG 2.2 05/04/2017       Xr

## 2017-05-04 NOTE — CM/SW NOTE
CTL met with pt at bedside. Pt is BPCI eligible under . [employer] program explained to pt who agrees to 90 day phone call follow up. [employer] and Medicare letter provided. Pt lives in a first floor condo alone.   States was very inde

## 2017-05-04 NOTE — DIETARY NOTE
ADULT NUTRITION INITIAL ASSESSMENT    Pt is at moderate nutrition risk. Pt does not meet malnutrition criteria.       RECOMMENDATIONS TO MD: DARIUS     NUTRITION DIAGNOSIS/PROBLEM:  Inadequate oral intake related to decreased ability to consume sufficient vickie kg (192 lb)      GASTROINTESTINAL PROBLEMS: dysphagia, abdominal pain, nausea, diarrhea and mouth sores    FOOD/NUTRITION RELATED HISTORY:  Appetite: Poor  Intake: <50%    Intake Meeting Needs: No    Food Allergies: No  Cultural/Ethnic/Anabaptist Preference labs WNL      DIETITIAN FOLLOW UP: RD to follow up within 7 days   [address], [address] [facility]  Ext [phone]

## 2017-05-04 NOTE — PROGRESS NOTES
120 [facility] dosing service    Follow-up Pharmacokinetic Consult for Vancomycin Dosing     Lucina Ramos is a 73year old female admitted on 5/2 who is being treated for neutropenic fever. Patient is on day 3 of Vancomycin 1 gm IV Q 12 hours.   Goal tr while on Vancomycin to assess renal function. 4.  Pharmacy will follow and monitor renal function changes, toxicity and efficacy.     Rushmore Severe, [facility]  5/4/2017  10:28 AM  [address] Extension: [phone]

## 2017-05-04 NOTE — PROGRESS NOTES
32 [facility] Infectious Disease Progress Note    Elvira Acuña Patient Status:  Inpatient    1945 MRN [id number]   Location [facility] 4W/SW/SE Attending Zane Horn MD   Hosp Day # 2 PCP Jacey Vu MD     Subjective Regular rate and rhythm. No murmur. Abdomen:  Soft, distended, +tenderness    Extremities:  No lower extremity edema noted. Without clubbing or cyanosis. Skin: Normal texture and turgor. Neurologic: Cranial nerves are grossly intact.   Motor strength

## 2017-05-04 NOTE — PLAN OF CARE
Impaired Functional Mobility    • Achieve highest/safest level of mobility/gait Not Progressing          GASTROINTESTINAL - ADULT    • Minimal or absence of nausea and vomiting Progressing    • Maintains or returns to baseline bowel function Progressing

## 2017-05-05 ENCOUNTER — APPOINTMENT (OUTPATIENT)
Dept: HEMATOLOGY/ONCOLOGY | Facility: HOSPITAL | Age: 72
End: 2017-05-05
Attending: INTERNAL MEDICINE
Payer: MEDICARE

## 2017-05-05 ENCOUNTER — APPOINTMENT (OUTPATIENT)
Dept: GENERAL RADIOLOGY | Facility: HOSPITAL | Age: 72
DRG: 871 | End: 2017-05-05
Attending: INTERNAL MEDICINE
Payer: MEDICARE

## 2017-05-05 PROBLEM — I48.92 ATRIAL FLUTTER (HCC): Status: ACTIVE | Noted: 2017-05-05

## 2017-05-05 PROCEDURE — 71010 XR CHEST AP PORTABLE  (CPT=71010): CPT | Performed by: INTERNAL MEDICINE

## 2017-05-05 PROCEDURE — 99291 CRITICAL CARE FIRST HOUR: CPT | Performed by: HOSPITALIST

## 2017-05-05 PROCEDURE — 99233 SBSQ HOSP IP/OBS HIGH 50: CPT | Performed by: INTERNAL MEDICINE

## 2017-05-05 RX ORDER — CASTOR OIL AND BALSAM, PERU 788; 87 MG/G; MG/G
OINTMENT TOPICAL 2 TIMES DAILY PRN
Status: DISCONTINUED | OUTPATIENT
Start: 2017-05-05 | End: 2017-05-19

## 2017-05-05 RX ORDER — POTASSIUM CHLORIDE 29.8 MG/ML
40 INJECTION INTRAVENOUS ONCE
Status: COMPLETED | OUTPATIENT
Start: 2017-05-05 | End: 2017-05-05

## 2017-05-05 RX ORDER — FUROSEMIDE 10 MG/ML
40 INJECTION INTRAMUSCULAR; INTRAVENOUS 3 TIMES DAILY
Status: DISCONTINUED | OUTPATIENT
Start: 2017-05-06 | End: 2017-05-08

## 2017-05-05 RX ORDER — AMIODARONE HYDROCHLORIDE 200 MG/1
400 TABLET ORAL 3 TIMES DAILY
Status: DISCONTINUED | OUTPATIENT
Start: 2017-05-05 | End: 2017-05-05

## 2017-05-05 RX ORDER — SODIUM CHLORIDE 0.9 % (FLUSH) 0.9 %
10 SYRINGE (ML) INJECTION AS NEEDED
Status: DISCONTINUED | OUTPATIENT
Start: 2017-05-05 | End: 2017-05-11

## 2017-05-05 RX ORDER — IPRATROPIUM BROMIDE AND ALBUTEROL SULFATE 2.5; .5 MG/3ML; MG/3ML
3 SOLUTION RESPIRATORY (INHALATION) ONCE
Status: COMPLETED | OUTPATIENT
Start: 2017-05-05 | End: 2017-05-05

## 2017-05-05 RX ORDER — LIDOCAINE HYDROCHLORIDE 10 MG/ML
0.5 INJECTION, SOLUTION INFILTRATION; PERINEURAL ONCE AS NEEDED
Status: ACTIVE | OUTPATIENT
Start: 2017-05-05 | End: 2017-05-05

## 2017-05-05 RX ORDER — FUROSEMIDE 10 MG/ML
20 INJECTION INTRAMUSCULAR; INTRAVENOUS ONCE
Status: COMPLETED | OUTPATIENT
Start: 2017-05-05 | End: 2017-05-05

## 2017-05-05 RX ORDER — FUROSEMIDE 10 MG/ML
20 INJECTION INTRAMUSCULAR; INTRAVENOUS DAILY
Status: DISCONTINUED | OUTPATIENT
Start: 2017-05-05 | End: 2017-05-05

## 2017-05-05 RX ORDER — DILTIAZEM HYDROCHLORIDE 5 MG/ML
10 INJECTION INTRAVENOUS ONCE
Status: COMPLETED | OUTPATIENT
Start: 2017-05-05 | End: 2017-05-05

## 2017-05-05 RX ORDER — AMIODARONE HYDROCHLORIDE 200 MG/1
400 TABLET ORAL 3 TIMES DAILY
Status: DISCONTINUED | OUTPATIENT
Start: 2017-05-05 | End: 2017-05-06

## 2017-05-05 RX ORDER — METOPROLOL TARTRATE 5 MG/5ML
5 INJECTION INTRAVENOUS ONCE
Status: COMPLETED | OUTPATIENT
Start: 2017-05-05 | End: 2017-05-05

## 2017-05-05 RX ORDER — DILTIAZEM HYDROCHLORIDE 5 MG/ML
5 INJECTION INTRAVENOUS ONCE
Status: COMPLETED | OUTPATIENT
Start: 2017-05-05 | End: 2017-05-05

## 2017-05-05 RX ORDER — IPRATROPIUM BROMIDE AND ALBUTEROL SULFATE 2.5; .5 MG/3ML; MG/3ML
3 SOLUTION RESPIRATORY (INHALATION) EVERY 6 HOURS PRN
Status: DISCONTINUED | OUTPATIENT
Start: 2017-05-05 | End: 2017-05-05

## 2017-05-05 RX ORDER — 0.9 % SODIUM CHLORIDE 0.9 %
VIAL (ML) INJECTION
Status: COMPLETED
Start: 2017-05-05 | End: 2017-05-05

## 2017-05-05 RX ORDER — POTASSIUM CHLORIDE 29.8 MG/ML
INJECTION INTRAVENOUS
Status: COMPLETED
Start: 2017-05-05 | End: 2017-05-05

## 2017-05-05 RX ORDER — POTASSIUM CHLORIDE 20 MEQ/1
40 TABLET, EXTENDED RELEASE ORAL EVERY 4 HOURS
Status: ACTIVE | OUTPATIENT
Start: 2017-05-05 | End: 2017-05-05

## 2017-05-05 RX ORDER — IPRATROPIUM BROMIDE AND ALBUTEROL SULFATE 2.5; .5 MG/3ML; MG/3ML
3 SOLUTION RESPIRATORY (INHALATION) EVERY 6 HOURS SCHEDULED
Status: DISCONTINUED | OUTPATIENT
Start: 2017-05-05 | End: 2017-05-10

## 2017-05-05 RX ORDER — METOPROLOL TARTRATE 5 MG/5ML
5 INJECTION INTRAVENOUS EVERY 6 HOURS
Status: DISCONTINUED | OUTPATIENT
Start: 2017-05-05 | End: 2017-05-07

## 2017-05-05 NOTE — PROGRESS NOTES
[facility]    Progress Note    Courtney Hensley Patient Status:  Inpatient    1945 MRN [id number]   Location [facility] 4W/SW/SE Attending Dahiana Patel MD   Hosp Day # 3 PCP Sonia Childress MD       Subjective:   Pt rep 44* 05/05/2017   CREATSERUM 0.62 05/05/2017   BUN 12 05/05/2017    05/05/2017   K 3.3 05/05/2017    05/05/2017   CO2 18* 05/05/2017   * 05/05/2017   CA 7.6* 05/05/2017   ALB 3.3* 05/02/2017   ALKPHO 50 05/02/2017   BILT 1.8* 05/02/2017 secondary compressive atelectasis however correlate for symptoms of pneumonia. 6.  Coronary artery calcifications.                Ryan Nick MD  5/5/2017      Addendum  Tachycardia  - ECG showed afib?  - tele shows aflutter - rate at 180  - start c

## 2017-05-05 NOTE — OCCUPATIONAL THERAPY NOTE
OCCUPATIONAL THERAPY EVALUATION - INPATIENT     Room Number: 451/451-A  Evaluation Date: 5/5/2017  Type of Evaluation: Initial       Physician Order: IP Consult to Occupational Therapy  Reason for Therapy: ADL/IADL Dysfunction and Discharge Planning    [facility] education;Patient/Family training (goals  5-15)         OCCUPATIONAL THERAPY MEDICAL/SOCIAL HISTORY     Problem List  Principal Problem:    Sepsis [facility])  Active Problems:    Essential hypertension    COPD (chronic obstructive pulmonary disease) ([facility] Pt appeared overwhelmed w/ therapy, then EKG staff coming in at end of session    RANGE OF MOTION   Upper extremity ROM is within functional limits     STRENGTH ASSESSMENT  Upper extremity strength is within functional limits     COORDINATION  Gross Motor: Goals  on: 5-15-17   Frequency: 3-5x/week

## 2017-05-05 NOTE — DISCHARGE PLANNING
YAMEL followed up with the pt. PT and OT are pending at this time. The pt. Is uncertain if she will be able to return home with [place] [id number] vs needing rehab. YAMEL provided the SNF list and requested a DON screen from [facility].   Tentative referral also made to [facility]

## 2017-05-05 NOTE — CONSULTS
[facility] Cardiology  Report of Consultation    Landen Kaylee Patient Status:  Inpatient    1945 MRN [id number]   Location [facility] 3W/SW Attending Felicita Collet, MD   Hosp Day # 3 PCP [address] Estevez MD     Date of Admission:  2017 aesthetics     TONSILLECTOMY  1949    Comment      Family History of Premature CAD: No   Social History:    Smoking Status: Former Smoker                   Packs/Day: 1.00  Years: 36        Quit date: 02/01/2017    Alcohol Use: Yes           0.0 oz/week Gross per 24 hour   Intake   2868 ml   Output      0 ml   Net   2868 ml       Hemodynamic parameters (last 24 hours):      CONS: well developed, well nourished. HEAD/FACE:no trauma, normocephalic. EYES:conjunctiva not injected, no xanthelasma.  ENT:mucosa Abdomen+pelvis(contrast Only)(cpt=74177)    5/4/2017  CONCLUSION:  1. In the pancreatic body there is a nonspecific hypodense region measuring 20 x 32 mm. There is mild fat stranding surrounding this finding.   The pancreatic duct within this region is no

## 2017-05-05 NOTE — PROGRESS NOTES
[facility]  Hematology/Oncology  Progress Note    Landen Page Patient Status:  Inpatient    1945 MRN [id number]   Location [facility] 4W/SW/SE Attending Felicita Collet, MD   Hosp Day # 3 [address] Estevez MD     Subjective: seen/effaced and distally the pancreatic duct is    dilated.  This may represent localized edema of the pancreas as seen with acute pancreatitis, however suspicious for pancreatic tumor (possibly adenocarcinoma), when considering the appearance of the panc local regional lymph node involvement [id number], stage IIB s/p cycle 1 of FOLFIRINOX on 4/24/17 with neulasta given on day 3 presented with failure to thrive at zoltan in blood counts; on day 8 from treatment. Patient has evidence of E. Coli by blood cultures.   [address], [facility]

## 2017-05-05 NOTE — PROGRESS NOTES
05/05/17 1508   Clinical Encounter Type   Visited With Patient; Family   Routine Visit Introduction   Continue Visiting Yes   Crisis Visit Critical care  (RRT)   Chp responded to RRT, assisted family with transition to CCU and kept pt informed.

## 2017-05-05 NOTE — PHYSICAL THERAPY NOTE
PHYSICAL THERAPY EVALUATION - INPATIENT     Room Number: 451/451-A  Evaluation Date: 5/5/2017  Type of Evaluation: Initial  Physician Order: PT Eval and Treat    Presenting Problem: Failure to Thrive  Reason for Therapy: Mobility Dysfunction and Discha the lower quadrants.      Problem List  Principal Problem:    Sepsis ([facility]  Active Problems:    Essential hypertension    COPD (chronic obstructive pulmonary disease) (HCC)    Malignant neoplasm of body of pancreas (HCC)    Pancytopenia (HCC)    Diarrhea extremity ROM is within functional limits     Lower extremity strength is grossly graded 4/5  BALANCE  Static Sitting: Good  Dynamic Sitting: Good  Static Standing: Fair -  Dynamic Standing: Poor +    ACTIVITY TOLERANCE  O2 Saturation: 95%  O2 Device: Vox[employer] session/findings    CURRENT GOALS    Goals to be met by: 5/12/17  Patient Goal Patient's self-stated goal is: get stronger and go home   Goal #1 Patient is able to demonstrate supine - sit EOB @ level: modified independent     Goal #1   Current Status    G

## 2017-05-05 NOTE — PROGRESS NOTES
Pt started on telemetry per order. Call from telemetry stating pt in Atrial flutter sustaining 180s-200s. Dr. Sarah Hinds aware. Cardizem drip and transfer order placed. Pt with no s/s of distress. Explained the plan to the pt. Report given to [facility] on 3CV.  Pt

## 2017-05-05 NOTE — PROGRESS NOTES
Pt received from 4th floor pt hr on tgele was 199-200 pt started on diltiazem drip with 5 mg bolus prootocol followed.   Pt hr remained sustained in 190-200 dr. Arsen Hudson notiified  Pt asympotmatic during that time bp upon starting drip was spbp in 100 after

## 2017-05-05 NOTE — PROGRESS NOTES
Rapid response note     RRT called by RN as patient was sustaining heart rates from 180s to 200. She was asymptomatic. Denied dizziness, lightheadedness, chest pain, palpitations. No worsening sob. No fevers/chills. No n/v/abd pain.   She was started o

## 2017-05-05 NOTE — PROGRESS NOTES
[address] is a 73year old female. No chief complaint on file. HPI:    Abdominal pain    REVIEW OF SYSTEMS:   A comprehensive 11 point review of systems was completed. Pertinent positives and negatives noted in the the HPI.     Allergies:  No Spoke with pt.               Lab Results  Component Value Date   WBC 2.2 05/05/2017   WBC 2.2 05/05/2017   HGB 9.4 05/05/2017   HGB 9.3 05/05/2017   HCT 27.8 05/05/2017   HCT 27.9 05/05/2017   PLT 42 05/05/2017   PLT 44 05/05/2017   CREATSERUM 0.62 05/05/20

## 2017-05-06 PROCEDURE — 99232 SBSQ HOSP IP/OBS MODERATE 35: CPT | Performed by: INTERNAL MEDICINE

## 2017-05-06 RX ORDER — SODIUM CHLORIDE 9 MG/ML
INJECTION, SOLUTION INTRAVENOUS CONTINUOUS
Status: DISCONTINUED | OUTPATIENT
Start: 2017-05-05 | End: 2017-05-06

## 2017-05-06 RX ORDER — POTASSIUM CHLORIDE 20 MEQ/1
40 TABLET, EXTENDED RELEASE ORAL
Status: DISPENSED | OUTPATIENT
Start: 2017-05-06 | End: 2017-05-07

## 2017-05-06 RX ORDER — POTASSIUM CHLORIDE 14.9 MG/ML
20 INJECTION INTRAVENOUS ONCE
Status: COMPLETED | OUTPATIENT
Start: 2017-05-06 | End: 2017-05-06

## 2017-05-06 RX ORDER — POTASSIUM CHLORIDE 29.8 MG/ML
40 INJECTION INTRAVENOUS ONCE
Status: COMPLETED | OUTPATIENT
Start: 2017-05-06 | End: 2017-05-06

## 2017-05-06 RX ORDER — DILTIAZEM HYDROCHLORIDE 5 MG/ML
10 INJECTION INTRAVENOUS ONCE
Status: COMPLETED | OUTPATIENT
Start: 2017-05-06 | End: 2017-05-06

## 2017-05-06 RX ORDER — AMIODARONE HYDROCHLORIDE 200 MG/1
200 TABLET ORAL
Status: DISCONTINUED | OUTPATIENT
Start: 2017-05-06 | End: 2017-05-08

## 2017-05-06 RX ORDER — MAGNESIUM SULFATE HEPTAHYDRATE 40 MG/ML
2 INJECTION, SOLUTION INTRAVENOUS ONCE
Status: COMPLETED | OUTPATIENT
Start: 2017-05-06 | End: 2017-05-06

## 2017-05-06 RX ORDER — POTASSIUM CHLORIDE 29.8 MG/ML
INJECTION INTRAVENOUS
Status: COMPLETED
Start: 2017-05-06 | End: 2017-05-06

## 2017-05-06 NOTE — PROGRESS NOTES
32 [facility] Infectious Disease Progress Note    Eusebia Lima Patient Status:  Inpatient    1945 MRN [id number]   Location [facility] 4W/SW/SE Attending Aylin Luis MD   Hosp Day # 4 PCP Mara Mcdaniel MD     Subjective (CARAFATE) 1 GM/10ML suspension 1 g, 1 g, Oral, TID AC and HS  •  acetaminophen (TYLENOL) tab 650 mg, 650 mg, Oral, Q6H PRN  •  Piperacillin Sod-Tazobactam So (ZOSYN) 3.375 g in dextrose 5 % 100 mL IVPB, 3.375 g, Intravenous, Q8H    Physical Exam:  General cycle of FOLFIRINOX on 4/24  4. Mucositis  -on IV fluconazole and nystatin  5.   Diarrhea  -has not had BM since admission  -CT with colitis  -start PO vancomcyin  -check stool when possible      If you have any questions or concerns please call [facility] at

## 2017-05-06 NOTE — PROGRESS NOTES
[facility]    Progress Note    Rimma James Patient Status:  Inpatient    1945 MRN [id number]   Location [facility] 2W/SW Attending Con Mayfield MD   Hosp Day # 4 PCP Johnny Johnson MD     Subjective:     Constit depressed.        Assessment and Plan:       Malignant neoplasm of body of pancreas [facility])  Patient has clinical stage T2 N1 M0 adenocarcinoma of the pacreas  She is s/p one cycle of FOLFIRINOX on 4/24/17 with neulasta on day 3      Pancytopenia ([address]  Count

## 2017-05-06 NOTE — PROGRESS NOTES
Night MD - CCU    Impressions:  ---AF/RVR with rate still high 130+ after IV Cardizem (increased to 25 mg/hr now) + IV Lopressor 5 mg recently, and 1st dose PO amiodarone 400 mg given this PM; also given IV Lasix (for tachypnea, dyspnea, wheezes not cleari spite of Cardizem 25 mg/hour, Lopressor IV 5 mg x 2 doses (widely spaced) and a first dose of PO amiodarone 400 mg given tonight. She had also been given a nebulizer tx and was given 20 mg IV Lasix before I saw her.   By the time I arrived to see her, she 3. 70-5.40 M/UL   HGB 9.4 (L) 12.0-16.0 g/dL   HCT 27.2 (L) 35.0-48.0 %   MCV 83.6 80.0-100.0 fL   MCH 28.8 27.0-32.0 pg   MCHC 34.4 32.0-37.0 g/dl   RDW 12.7 11.0-15.0 %   PLT 24 (LL) 140-400 K/UL   MPV 10.9 (H) 7.4-10.3 fL   Neutrophil % 1 %   Lymphocyte American >60 >=60   GFR, -American >60 >=60   -CBC, PLATELET; NO DIFFERENTIAL   Collection Time: 05/04/17  5:56 AM   Result Value Ref Range   WBC 0.7 (LL) 4.0-11.0 K/UL   RBC 2.80 (L) 3.70-5.40 M/UL   HGB 8.0 (L) 12.0-16.0 g/dL   HCT 23.7 (L) 35.0-4 HORMONE   Collection Time: 05/04/17  9:18 AM   Result Value Ref Range   TSH 0.18 (L) 0.45-5.33 uIU/mL   -FREE T4 (FREE THYROXINE)   Collection Time: 05/04/17  9:18 AM   Result Value Ref Range   Free T4 1.22 0.58-1.64 ng/dL   -BLOOD CULTURE   Collection Darcy Aguilar 1.8-7.7 K/UL   Lymphocyte Absolute 1.1 1.0-4.0 K/UL   Monocyte Absolute 0.2 0.0-1.0 K/UL   Eosinophil Absolute 0.0 0.0-0.7 K/UL   Basophil Absolute 0.0 0.0-0.2 K/UL   Toxic Granulation Few (A)    -MAGNESIUM   Collection Time: 05/05/17  4:59 AM   Result Leigha

## 2017-05-06 NOTE — PROGRESS NOTES
[facility] Cardiology Progress Note - Wanda Louder Patient Status:  Inpatient    1945 MRN [id number]   Location [facility] 2W/SW Attending Malina Yates MD   Hosp Day # 4 PCP Karin Canela MD     Subjective: abdominal pain     Pancytopenia due to chemotherapy [facility])     Atrial flutter (HCC)     Sepsis ([facility]      Objective:   Temp: 97.3 °F (36.3 °C)  Pulse: 120  Resp: 27  BP: 104/70 mmHg    Intake/Output:     Intake/Output Summary (Last 24 hours) at 05/06/17 092 18*   --   19*  22   BUN  12   --   11   --   12   --   13  13   CREATSERUM  0.78   --   0.74   --   0.62   --   0.70  0.62   CA  7.0*   --   7.3*   --   7.6*   --   7.6*  7.5*   MG   --    --   2.2   --   2.2   --    --   1.6*   GLU  123*   --   147*   - (ZOSYN) 3.375 g in dextrose 5 % 100 mL IVPB 3.375 g Intravenous Jarrett Doe MD  5/6/2017  9:21 AM

## 2017-05-06 NOTE — PROGRESS NOTES
[facility]    Progress Note    Amanuel Sheehan Patient Status:  Inpatient    1945 MRN [id number]   Location [facility] 2W/SW Attending Trevor Lopez MD   Hosp Day # 4 PCP Sherryle Simmering, MD     Subjective:     Constit Abdomen+pelvis(contrast Only)(cpt=74177)    5/4/2017  CONCLUSION:  1. In the pancreatic body there is a nonspecific hypodense region measuring 20 x 32 mm. There is mild fat stranding surrounding this finding.   The pancreatic duct within this region is no ABNORMAL No previous ECGs available Electronically signed on 05/05/2017 at 12:54 by Cecile Bernheim.       Assessment and Plan:   Principal Problem:    Sepsis(995.91)  Active Problems:    Essential hypertension    COPD (chronic obstructive pulmonary disease)

## 2017-05-07 ENCOUNTER — APPOINTMENT (OUTPATIENT)
Dept: GENERAL RADIOLOGY | Facility: HOSPITAL | Age: 72
DRG: 871 | End: 2017-05-07
Attending: INTERNAL MEDICINE
Payer: MEDICARE

## 2017-05-07 ENCOUNTER — APPOINTMENT (OUTPATIENT)
Dept: ULTRASOUND IMAGING | Facility: HOSPITAL | Age: 72
DRG: 871 | End: 2017-05-07
Attending: INTERNAL MEDICINE
Payer: MEDICARE

## 2017-05-07 ENCOUNTER — APPOINTMENT (OUTPATIENT)
Dept: CV DIAGNOSTICS | Facility: HOSPITAL | Age: 72
DRG: 871 | End: 2017-05-07
Attending: INTERNAL MEDICINE
Payer: MEDICARE

## 2017-05-07 PROCEDURE — 93306 TTE W/DOPPLER COMPLETE: CPT | Performed by: INTERNAL MEDICINE

## 2017-05-07 PROCEDURE — 76705 ECHO EXAM OF ABDOMEN: CPT | Performed by: INTERNAL MEDICINE

## 2017-05-07 PROCEDURE — 74000 XR ABDOMEN (1 VIEW) (CPT=74000): CPT | Performed by: INTERNAL MEDICINE

## 2017-05-07 RX ORDER — METOPROLOL TARTRATE 5 MG/5ML
10 INJECTION INTRAVENOUS EVERY 6 HOURS
Status: DISCONTINUED | OUTPATIENT
Start: 2017-05-07 | End: 2017-05-09

## 2017-05-07 RX ORDER — MAGNESIUM OXIDE 400 MG (241.3 MG MAGNESIUM) TABLET
400 TABLET ONCE
Status: DISCONTINUED | OUTPATIENT
Start: 2017-05-07 | End: 2017-05-07

## 2017-05-07 RX ORDER — POTASSIUM CHLORIDE 29.8 MG/ML
40 INJECTION INTRAVENOUS ONCE
Status: DISCONTINUED | OUTPATIENT
Start: 2017-05-07 | End: 2017-05-08

## 2017-05-07 RX ORDER — MAGNESIUM SULFATE HEPTAHYDRATE 40 MG/ML
2 INJECTION, SOLUTION INTRAVENOUS ONCE
Status: COMPLETED | OUTPATIENT
Start: 2017-05-07 | End: 2017-05-07

## 2017-05-07 RX ORDER — METOPROLOL TARTRATE 5 MG/5ML
5 INJECTION INTRAVENOUS ONCE
Status: COMPLETED | OUTPATIENT
Start: 2017-05-07 | End: 2017-05-07

## 2017-05-07 RX ORDER — POTASSIUM CHLORIDE 29.8 MG/ML
40 INJECTION INTRAVENOUS ONCE
Status: COMPLETED | OUTPATIENT
Start: 2017-05-07 | End: 2017-05-07

## 2017-05-07 RX ORDER — POTASSIUM CHLORIDE 14.9 MG/ML
20 INJECTION INTRAVENOUS ONCE
Status: COMPLETED | OUTPATIENT
Start: 2017-05-07 | End: 2017-05-07

## 2017-05-07 NOTE — PROGRESS NOTES
32 [facility] Infectious Disease  Progress Note    Chaitanya Gillis Patient Status:  Inpatient    1945 MRN [id number]   Location [facility] 2W/SW Attending Angelica Cuellar MD   Hosp Day # 5 PCP Marco Antonio Alex MD     Subjective: region is not clearly seen/effaced and distally the pancreatic duct is dilated.  This may represent localized edema of the pancreas as seen with acute pancreatitis, however suspicious for pancreatic tumor (possibly adenocarcinoma), when considering the gonzalo p.o. Vancomycin prophy alone. No further fevers but if there are, we can repeat pancultures and consider broadening antibiotics once again. Trending temps and WBCs.  >35min spent at patient's bedside today in examination and coordination of plan of care.

## 2017-05-07 NOTE — PROGRESS NOTES
[facility] Cardiology Progress Note - Sandee Stringer Patient Status:  Inpatient    1945 MRN [id number]   Location [facility] 2W/SW Attending Danielle Abad MD   Hosp Day # 5 PCP Gerry Salguero MD     Subjective: bacteria     Lower abdominal pain     Pancytopenia due to chemotherapy [facility])     Atrial flutter (HCC)     Sepsis ([facility]      Objective:   Temp: 97 °F (36.1 °C)  Pulse: 97  Resp: 24  BP: 94/63 mmHg    Intake/Output:     Intake/Output Summary (Last 24 hours) 10   CREATSERUM  0.74   --   0.62   --   0.70  0.62  0.71  0.80  0.80   CA  7.3*   --   7.6*   --   7.6*  7.5*  7.8*  7.7*  7.6*   MG  2.2   --   2.2   --    --   1.6*   --    --   1.7*   PHOS   --    --    --    --    --    --    --   1.6*  2.0*   GLU  14 15 mg 15 mg Oral Nightly   Umeclidinium Bromide (INCRUSE ELLIPTA) 62.5 MCG/INH inhaler 1 puff 1 puff Inhalation Daily   sucralfate (CARAFATE) 1 GM/10ML suspension 1 g 1 g Oral TID AC and HS   acetaminophen (TYLENOL) tab 650 mg 650 mg Oral Q6H PRN   Piperac

## 2017-05-07 NOTE — PROGRESS NOTES
[facility]    Progress Note    Mandy Guo Patient Status:  Inpatient    1945 MRN [id number]   Location [facility] 2W/SW Attending Cristin Montes MD   Hosp Day # 5 PCP Hany Mays MD     Subjective:     Constit Ap Portable  (cpt=71010)    5/6/2017  CONCLUSION:  1. Borderline cardiomegaly. Tortuous thoracic aorta. 2. Bilateral Perihilar and bibasilar parenchymal opacification suggesting atelectasis and/or consolidation with interval progression. . 3. Small 11 mm op

## 2017-05-07 NOTE — PROGRESS NOTES
[facility]    Progress Note    Courtney Hensley Patient Status:  Inpatient    1945 MRN [id number]   Location [facility] 2W/SW Attending Dahiana Patel MD   Hosp Day # 5 PCP Sonia Childress MD     Subjective:     Constit normal. Judgment and thought content normal. Her mood appears depressed.        Assessment and Plan:       Malignant neoplasm of body of pancreas [facility])  Patient has clinical stage T2 N1 M0 adenocarcinoma of the pacreas  She is s/p one cycle of FOLFIRINOX on Zenker's diverticulum versus esophageal diverticulum. Ekg 12-lead    5/7/2017  ECG Report  Interpretation  -------------------------- Atrial fibrillation with RVR-abnormality -Nondiagnostic -Possible Anterior/lateral and inferior ischemia.  ABNORMAL

## 2017-05-08 ENCOUNTER — TELEPHONE (OUTPATIENT)
Dept: HEMATOLOGY/ONCOLOGY | Facility: HOSPITAL | Age: 72
End: 2017-05-08

## 2017-05-08 ENCOUNTER — APPOINTMENT (OUTPATIENT)
Dept: HEMATOLOGY/ONCOLOGY | Facility: HOSPITAL | Age: 72
End: 2017-05-08
Attending: INTERNAL MEDICINE
Payer: MEDICARE

## 2017-05-08 PROCEDURE — 99233 SBSQ HOSP IP/OBS HIGH 50: CPT | Performed by: INTERNAL MEDICINE

## 2017-05-08 RX ORDER — POTASSIUM CHLORIDE 14.9 MG/ML
20 INJECTION INTRAVENOUS ONCE
Status: COMPLETED | OUTPATIENT
Start: 2017-05-08 | End: 2017-05-08

## 2017-05-08 RX ORDER — HEPARIN SODIUM 1000 [USP'U]/ML
30 INJECTION, SOLUTION INTRAVENOUS; SUBCUTANEOUS ONCE
Status: COMPLETED | OUTPATIENT
Start: 2017-05-08 | End: 2017-05-08

## 2017-05-08 RX ORDER — MAGNESIUM SULFATE HEPTAHYDRATE 40 MG/ML
2 INJECTION, SOLUTION INTRAVENOUS ONCE
Status: COMPLETED | OUTPATIENT
Start: 2017-05-08 | End: 2017-05-08

## 2017-05-08 RX ORDER — FUROSEMIDE 10 MG/ML
40 INJECTION INTRAMUSCULAR; INTRAVENOUS DAILY
Status: DISCONTINUED | OUTPATIENT
Start: 2017-05-08 | End: 2017-05-11

## 2017-05-08 RX ORDER — HEPARIN SODIUM AND DEXTROSE 10000; 5 [USP'U]/100ML; G/100ML
INJECTION INTRAVENOUS CONTINUOUS
Status: DISCONTINUED | OUTPATIENT
Start: 2017-05-08 | End: 2017-05-11

## 2017-05-08 RX ORDER — POTASSIUM CHLORIDE 29.8 MG/ML
40 INJECTION INTRAVENOUS ONCE
Status: COMPLETED | OUTPATIENT
Start: 2017-05-08 | End: 2017-05-08

## 2017-05-08 RX ORDER — HEPARIN SODIUM AND DEXTROSE 10000; 5 [USP'U]/100ML; G/100ML
1000 INJECTION INTRAVENOUS ONCE
Status: COMPLETED | OUTPATIENT
Start: 2017-05-08 | End: 2017-05-08

## 2017-05-08 RX ORDER — HEPARIN SODIUM 1000 [USP'U]/ML
5000 INJECTION, SOLUTION INTRAVENOUS; SUBCUTANEOUS ONCE
Status: COMPLETED | OUTPATIENT
Start: 2017-05-08 | End: 2017-05-08

## 2017-05-08 NOTE — PROGRESS NOTES
[facility]    Progress Note    Derrick Monet Patient Status:  Inpatient    1945 MRN [id number]   Location [facility] 2W/SW Attending Mallika Montanez MD   Hosp Day # 6 PCP Dale Tom MD     Subjective:     Constit --------------------------       Assessment and Plan:   Principal Problem:    Sepsis(995.91)  Active Problems:    Essential hypertension    COPD (chronic obstructive pulmonary disease) (HCC)    Malignant neoplasm of body of pancreas (HCC)    Pancytopenia (

## 2017-05-08 NOTE — DISCHARGE PLANNING
SW met with the patient re rehab choices. Her sister is touring, but they are considering [facility], [facility] and [facility]. Referrals sent to all 3 facilities and a DON was requested.     Nicky Dominguez [facility]  [id number]

## 2017-05-08 NOTE — PLAN OF CARE
GASTROINTESTINAL - ADULT    • Maintains or returns to baseline bowel function Not Progressing          GASTROINTESTINAL - ADULT    • Minimal or absence of nausea and vomiting Progressing        HEMATOLOGIC - ADULT    • Maintains hematologic stability Progr

## 2017-05-08 NOTE — PROGRESS NOTES
32 [facility] Infectious Disease  Progress Note    Alanlanianu Jacob Patient Status:  Inpatient    1945 MRN [id number]   Location [facility] 2W/SW Attending Con Mayfield MD   Hosp Day # 6 PCP Johnny Johnson MD     Subjective: seen/effaced and distally the pancreatic duct is dilated.  This may represent localized edema of the pancreas as seen with acute pancreatitis, however suspicious for pancreatic tumor (possibly adenocarcinoma), when considering the appearance of the pancrea prophy alone.  No further fevers but if there are, we can repeat pancultures and consider broadening antibiotics once again.  Trending temps and WBCs.  >35min spent at patient's bedside today in examination and coordination of plan of care.  All questions

## 2017-05-08 NOTE — PHYSICAL THERAPY NOTE
PHYSICAL THERAPY TREATMENT NOTE - INPATIENT    Room Number: 380/041-P       Presenting Problem: Failure to Thrive    Problem List  Principal Problem:    Sepsis(995.91)  Active Problems:    Essential hypertension    COPD (chronic obstructive pulmonary dise Poor  Dynamic Standing: Poor -    ACTIVITY TOLERANCE  No shortness of breath  Heart Rate: Afib from 118 to 140 with activity    AM-PAC '6-Clicks' INPATIENT SHORT FORM - BASIC MOBILITY  How much difficulty does the patient currently have. ..  -   Turning ove concerns addressed; Family present    CURRENT GOALS     Goals to be met by: 5/12/17  All goals ongoing 5/8/2017    Patient Goal  Patient's self-stated goal is: get stronger and go home    Goal #1  Patient is able to demonstrate supine - sit EOB @ level: min

## 2017-05-08 NOTE — PROGRESS NOTES
[facility]  Hematology/Oncology  Progress Note    [facility] Patient Status:  Inpatient    1945 MRN [id number]   Location [facility] 4W/SW/SE Attending Harshad Guy MD   Hosp Day # 6 PCP Ashley Trinidad MD     Subjective: [facility])     Encounter for antineoplastic chemotherapy     Chemotherapy management, encounter for     Dehydration     Rectal bleeding     Malignant neoplasm of pancreas, unspecified location of malignancy ([facility] [address]     Anemia     Pancytopenia (HCC)     Vomiting next regimen    2.) Sepsis due to E. Coli in blood    --She has evidence of E.  Coli in her blood cultures from 5/2/17  --Repeat cultures from 5/4/17 are no growth to date ×4 days  --ID is following and pt is on wide spectrum anti-infective coverage    3.)

## 2017-05-08 NOTE — PROGRESS NOTES
[facility] Cardiology Progress Note - Nataly Gonzalez Patient Status:  Inpatient    1945 MRN [id number]   Location [facility] 2W/SW Attending Sanaz Morales MD   Hosp Day # 6 PCP Allegra Fung MD     Subjective: Intake/Output Summary (Last 24 hours) at 05/08/17 0824  Last data filed at 05/08/17 0600   Gross per 24 hour   Intake   3013 ml   Output   3250 ml   Net   -237 ml       Last 3 Weights  05/02/17 1314 : 184 lb (83.462 kg)  04/28/17 0036 : 189 lb 3.2 oz ( 26  24  27   ALB  2.1*  2.1*  2.1*  2.2*       Recent Labs   Lab  05/06/17   0108   TROP  0.01           Tele: AF        CXR  =====  CONCLUSION:   1. Borderline cardiomegaly. Tortuous thoracic aorta.   2. Bilateral Perihilar and bibasilar parenchymal opacif 15 mg 15 mg Oral Nightly   Umeclidinium Bromide (INCRUSE ELLIPTA) 62.5 MCG/INH inhaler 1 puff 1 puff Inhalation Daily   sucralfate (CARAFATE) 1 GM/10ML suspension 1 g 1 g Oral TID AC and HS   acetaminophen (TYLENOL) tab 650 mg 650 mg Oral Q6H PRN   Piperac

## 2017-05-09 PROCEDURE — 99233 SBSQ HOSP IP/OBS HIGH 50: CPT | Performed by: INTERNAL MEDICINE

## 2017-05-09 RX ORDER — POTASSIUM CHLORIDE 29.8 MG/ML
40 INJECTION INTRAVENOUS ONCE
Status: COMPLETED | OUTPATIENT
Start: 2017-05-09 | End: 2017-05-10

## 2017-05-09 RX ORDER — AMIODARONE HYDROCHLORIDE 200 MG/1
400 TABLET ORAL 3 TIMES DAILY
Status: DISCONTINUED | OUTPATIENT
Start: 2017-05-09 | End: 2017-05-10

## 2017-05-09 RX ORDER — DIGOXIN 125 MCG
125 TABLET ORAL DAILY
Status: DISCONTINUED | OUTPATIENT
Start: 2017-05-10 | End: 2017-05-10

## 2017-05-09 RX ORDER — DIGOXIN 0.25 MG/ML
250 INJECTION INTRAMUSCULAR; INTRAVENOUS 2 TIMES DAILY
Status: DISCONTINUED | OUTPATIENT
Start: 2017-05-09 | End: 2017-05-10

## 2017-05-09 NOTE — PROGRESS NOTES
[facility]    Progress Note    Mary Jimenez Patient Status:  Inpatient    1945 MRN [id number]   Location [facility] 2W/SW Attending Leighann Estrada MD   Hosp Day # 7 PCP Jimmy Mansfield MD     Subjective:     Constit 5. Bibasilar lung consolidation/atelectasis left greater than right.          Ekg 12-lead    5/8/2017  ECG Report  Interpretation  -------------------------- Atrial fibrillation -Diffuse ST depression + Extensive T-abnormality -Nondiagnostic -Possible Anter

## 2017-05-09 NOTE — OCCUPATIONAL THERAPY NOTE
OCCUPATIONAL THERAPY TREATMENT NOTE - INPATIENT     Room Number: 599/930-K          Presenting Problem: sepsis    Problem List  Principal Problem:    Sepsis(995.91)  Active Problems:    Essential hypertension    COPD (chronic obstructive pulmonary disease) RESTRICTION  Weight Bearing Restriction: None                PAIN ASSESSMENT  Ratin  Location: anterior thigh pain  Management Techniques:  Activity promotion     ACTIVITY TOLERANCE  O2 Saturation at rest 95% and with activity 92%  O2 Device: Nasal sarah will complete LE dressing min assist  Comment: goal ongoing- min A to don socks with LHAE                Goals  on: 5-15-17   Frequency: 3-5x/week

## 2017-05-09 NOTE — PROGRESS NOTES
[facility] Cardiology  Progress Note    Marisol Torre Patient Status:  Inpatient    1945 MRN [id number]   Location [facility] 2W/SW Attending Victorina Rodriguez MD   Hosp Day # 7 PCP Lucian Montanez MD     Assessment and Plan:     1.  Atrial fibr --   111*  121*   BUN  10   --    --    --   10  11   CREATSERUM  0.80   --   0.71   --   0.82  0.65   GFRAA  >60   --   >60   --   >60  >60   GFRNAA  >60   --   >60   --   >60  >60   CA  7.6*   --    --    --   7.9*  7.8*   NA  137   --    --    --   139

## 2017-05-09 NOTE — PROGRESS NOTES
32 [facility] Infectious Disease  Progress Note    Debra Vaca Patient Status:  Inpatient    1945 MRN [id number]   Location [facility] 2W/SW Attending Alize Chavez MD   Hosp Day # 7 PCP Elvis Anguiano MD     Subjective: duct within this region is not clearly seen/effaced and distally the pancreatic duct is dilated.  This may represent localized edema of the pancreas as seen with acute pancreatitis, however suspicious for pancreatic tumor (possibly adenocarcinoma), when co fluconazole as well.  Continue p.o. Vancomycin prophy alone.  No further fevers but if there are, we can repeat pancultures and consider broadening antibiotics once again.  Trending temps and WBCs.  Once d/c plans are set, we anticipate transition to p.o.

## 2017-05-09 NOTE — DISCHARGE PLANNING
YAMEL following up on d/c planning for the patient. Updates including therapy notes sent to [facility], [address] [place] [address] and [place] .     Johnny Solo [facility]  [id number]

## 2017-05-09 NOTE — PLAN OF CARE
GASTROINTESTINAL - ADULT    • Maintains or returns to baseline bowel function Not Progressing          DISCHARGE PLANNING    • Discharge to home or other facility with appropriate resources Progressing        GASTROINTESTINAL - ADULT    • Minimal or absenc

## 2017-05-09 NOTE — PHYSICAL THERAPY NOTE
Edwardo Ward  PHYSICAL THERAPY TREATMENT NOTE - INPATIENT    Room Number: 501/861-M       Presenting Problem: Failure to Thrive    Problem List  Principal Problem:    Sepsis(995.91)  Active Problems:    Essential hypertension    COPD (chronic obstructive pulmonary dis Restriction: None        PAIN ASSESSMENT   Ratin  Location: abdominal  Management Techniques: Relaxation;Repositioning    BALANCE                                                                                                                     Static c/o pain on the ant thigh thus bed elevated for ease of transition require mod A + RW as support 100% cueing on hand/leg placement and body mechanics. Gt training done with RW 10' shuffling, chair following on 8L/nc. Alen Sherman  Placed on the recliner and made comfo

## 2017-05-10 ENCOUNTER — APPOINTMENT (OUTPATIENT)
Dept: HEMATOLOGY/ONCOLOGY | Facility: HOSPITAL | Age: 72
End: 2017-05-10
Attending: INTERNAL MEDICINE
Payer: MEDICARE

## 2017-05-10 PROCEDURE — 99233 SBSQ HOSP IP/OBS HIGH 50: CPT | Performed by: INTERNAL MEDICINE

## 2017-05-10 RX ORDER — LOPERAMIDE HYDROCHLORIDE 2 MG/1
2 CAPSULE ORAL EVERY 6 HOURS PRN
Status: DISCONTINUED | OUTPATIENT
Start: 2017-05-10 | End: 2017-05-12

## 2017-05-10 RX ORDER — IPRATROPIUM BROMIDE AND ALBUTEROL SULFATE 2.5; .5 MG/3ML; MG/3ML
3 SOLUTION RESPIRATORY (INHALATION) EVERY 6 HOURS PRN
Status: DISCONTINUED | OUTPATIENT
Start: 2017-05-10 | End: 2017-05-19

## 2017-05-10 RX ORDER — MAGNESIUM OXIDE 400 MG (241.3 MG MAGNESIUM) TABLET
400 TABLET ONCE
Status: COMPLETED | OUTPATIENT
Start: 2017-05-10 | End: 2017-05-10

## 2017-05-10 RX ORDER — AMIODARONE HYDROCHLORIDE 200 MG/1
200 TABLET ORAL 3 TIMES DAILY
Status: DISCONTINUED | OUTPATIENT
Start: 2017-05-10 | End: 2017-05-10

## 2017-05-10 RX ORDER — POTASSIUM CHLORIDE 1.5 G/1.77G
40 POWDER, FOR SOLUTION ORAL ONCE
Status: COMPLETED | OUTPATIENT
Start: 2017-05-10 | End: 2017-05-10

## 2017-05-10 RX ORDER — IPRATROPIUM BROMIDE AND ALBUTEROL SULFATE 2.5; .5 MG/3ML; MG/3ML
3 SOLUTION RESPIRATORY (INHALATION)
Status: DISCONTINUED | OUTPATIENT
Start: 2017-05-11 | End: 2017-05-12

## 2017-05-10 RX ORDER — AMIODARONE HYDROCHLORIDE 200 MG/1
200 TABLET ORAL DAILY
Status: DISCONTINUED | OUTPATIENT
Start: 2017-05-11 | End: 2017-05-19

## 2017-05-10 NOTE — PROGRESS NOTES
[facility] Cardiology  Progress Note    Natalie Iniguez Patient Status:  Inpatient    1945 MRN [id number]   Location [facility] [place] 2W/SW Attending Sanaz Morales MD   Hosp Day # 8 PCP Allegra Fung MD     Assessment and Plan:     1.  Atrial fibr CO2  28  28  28     No results for input(s): TROP, CK in the last 72 hours.   Recent Labs   Lab  05/10/17   0602   RBC  3.45*   HGB  9.8*   HCT  29.8*   MCV  86.3   MCH  28.4   MCHC  32.9   RDW  13.9   WBC  18.1*   PLT  220                   Ute Starr

## 2017-05-10 NOTE — DISCHARGE PLANNING
SW received a call from the pt's sister Mary Jo Mathewew [phone] who stated she would like to have the pt. Rehab at [facility] as their first choice. Referral and update sent. DON screen has been previously requested.         Cristiano Dillard,

## 2017-05-10 NOTE — PLAN OF CARE
Problem: RISK FOR INFECTION - ADULT  Goal: Absence of fever/infection during anticipated neutropenic period  INTERVENTIONS  - Monitor WBC  - Administer growth factors as ordered  - Implement neutropenic guidelines   Outcome: Not Progressing  WBC cont to tr gait  - Educate and engage patient/family in tolerated activity level and precautions   Outcome: Not Progressing    Problem: GASTROINTESTINAL - ADULT  Goal: Minimal or absence of nausea and vomiting  INTERVENTIONS:  - Maintain adequate hydration with IV or Patient preferences are identified and integrated in the patient’s plan of care  Interventions:  - What would you like us to know as we care for you? Pt lives alone, has a stepdaughter who lives close by.  - Provide timely, complete, and accurate informatio

## 2017-05-10 NOTE — PROGRESS NOTES
[facility]    Molly Brady Patient Status:  Inpatient    1945 MRN [id number]   Location [facility] 2W/SW Attending Supriya Gilmore MD   Hosp Day # 8 PCP Damaris Millan MD       Subjective:   Pt report 1.22 05/04/2017   TSH 0.44* 05/07/2017   MG 1.7* 05/10/2017   PHOS 3.4 05/10/2017   TROP 0.01 05/06/2017                   Bahman Omer MD  5/10/2017

## 2017-05-10 NOTE — PROGRESS NOTES
[facility] AND [facility]  Hematology/Oncology  Progress Note    Amirah Wilkerson Patient Status:  Inpatient    1945 MRN [id number]   Location [facility] 4W/SW/SE Attending Scotty Garcia MD   Hosp Day # 8 PCP Vito Rojas MD     Subjective: extremities     Pancreatic mass     Malignant neoplasm of body of pancreas ([facility] [address]     Encounter for antineoplastic chemotherapy     Chemotherapy management, encounter for     Dehydration     Rectal bleeding     Malignant neoplasm of pancreas, unspecified lo cycle 1 of treatment  --Next chemotherapy cycle will be delayed, we have discussed option of possibly treating every 21 days with her next regimen    2.) Diarrhea    --Several days from her initial exposure to irinotecan chemotherapy, unlikely to still be

## 2017-05-10 NOTE — PROGRESS NOTES
32 [facility] Infectious Disease  Progress Note    David Silver Patient Status:  Inpatient    1945 MRN [id number]   Location [facility] 2W/SW Attending Dale Ríos MD   Hosp Day # 8 PCP Dereck Mcpherson MD     Subjective: surrounding this finding.  The pancreatic duct within this region is not clearly seen/effaced and distally the pancreatic duct is dilated.  This may represent localized edema of the pancreas as seen with acute pancreatitis, however suspicious for pancreati leukocytosis too  - Will repeat c.diff once again    6.  Disposition - inpatient.  Continue IV zosyn as Rx for gram negative sepsis. Continue IV fluconazole as well.  Continue p.o.  Vancomycin prophy alone but will repeat c.diff once again.  Trending temps

## 2017-05-10 NOTE — PROGRESS NOTES
[facility]  Hematology/Oncology  Progress Note    Debra Nola Patient Status:  Inpatient    1945 MRN [id number]   Location [facility] 4W/SW/SE Attending Alize Chavez MD   Hosp Day # 7 PCP Elvis Anguiano MD     Subjective: Encounter for antineoplastic chemotherapy     Chemotherapy management, encounter for     Dehydration     Rectal bleeding     Malignant neoplasm of pancreas, unspecified location of malignancy ([address]     Anemia     Pancytopenia ([address]     Vomiting     Diarr growth to date ×4 days  --ID is following and pt is on wide spectrum anti-infective coverage    3.) Atrial Flutter    --Agree with management per cardiology  --Pt's heart rate improving today, she's being weaned off of some cardiac agents  --Atrial flutter

## 2017-05-11 PROCEDURE — 99233 SBSQ HOSP IP/OBS HIGH 50: CPT | Performed by: INTERNAL MEDICINE

## 2017-05-11 RX ORDER — POTASSIUM CHLORIDE 1.5 G/1.77G
40 POWDER, FOR SOLUTION ORAL ONCE
Status: COMPLETED | OUTPATIENT
Start: 2017-05-11 | End: 2017-05-11

## 2017-05-11 RX ORDER — 0.9 % SODIUM CHLORIDE 0.9 %
VIAL (ML) INJECTION
Status: DISPENSED
Start: 2017-05-11 | End: 2017-05-11

## 2017-05-11 RX ORDER — SODIUM CHLORIDE 9 MG/ML
INJECTION, SOLUTION INTRAVENOUS
Status: COMPLETED
Start: 2017-05-11 | End: 2017-05-11

## 2017-05-11 RX ORDER — FAMOTIDINE 10 MG
10 TABLET ORAL 2 TIMES DAILY PRN
Status: DISCONTINUED | OUTPATIENT
Start: 2017-05-11 | End: 2017-05-19

## 2017-05-11 RX ORDER — CALCIUM CARBONATE 200(500)MG
500 TABLET,CHEWABLE ORAL 3 TIMES DAILY PRN
Status: DISCONTINUED | OUTPATIENT
Start: 2017-05-11 | End: 2017-05-19

## 2017-05-11 RX ORDER — MAGNESIUM OXIDE 400 MG (241.3 MG MAGNESIUM) TABLET
400 TABLET ONCE
Status: COMPLETED | OUTPATIENT
Start: 2017-05-11 | End: 2017-05-11

## 2017-05-11 RX ORDER — FUROSEMIDE 20 MG/1
20 TABLET ORAL DAILY
Status: DISCONTINUED | OUTPATIENT
Start: 2017-05-11 | End: 2017-05-18

## 2017-05-11 RX ORDER — PANTOPRAZOLE SODIUM 40 MG/1
40 TABLET, DELAYED RELEASE ORAL
Status: DISCONTINUED | OUTPATIENT
Start: 2017-05-11 | End: 2017-05-19

## 2017-05-11 NOTE — PROGRESS NOTES
[facility] AND [facility]  Hematology/Oncology  Progress Note    Gera Diane Patient Status:  Inpatient    1945 MRN [id number]   Location [facility] 4W/SW/SE Attending Du Dubon MD   Hosp Day # 9 PCP Samir Vicente MD     Subjective: Lymphedema of both lower extremities     Pancreatic mass     Malignant neoplasm of body of pancreas ([address]     Encounter for antineoplastic chemotherapy     Chemotherapy management, encounter for     Dehydration     Rectal bleeding     Malignant neoplasm of Gemcitibane/Abraxane based on her extended diarrhea complications from cycle 1 of treatment  --Next chemotherapy cycle will be delayed, we have discussed option of possibly treating every 21 days with her next regimen    2.) Diarrhea    --Several days from

## 2017-05-11 NOTE — PROGRESS NOTES
32 [facility] Infectious Disease Progress Note    Christine Lamas Patient Status:  Inpatient    1945 MRN [id number]   Location [facility] 4W/SW/SE Attending Forrest Valerio MD   Hosp Day # 9 PCP Adonay Goodman MD     Subjective 100 mL IVPB, 3.375 g, Intravenous, Q8H    Physical Exam:  General: Alert, orientated x3. Cooperative. No apparent distress. Vital Signs:  Blood pressure 128/60, pulse 65, temperature 98.7 °F (37.1 °C), temperature source Axillary, resp.  rate 18, weight e.coli, repeats negative  - If further blood cultures positive, would have to consider port involvement  - CT with colitis and other issues related to her malignancy  - p.o.  Vancomycin prophy ongoing, c.diff negative, stool panel negative  - IV zosyn ongoi

## 2017-05-11 NOTE — DISCHARGE PLANNING
Bed is available at [facility] when pt is stable for dc. This was the family's top choice. [address] denied the case due to bed availability.     SHIRLEY saldaña [id number]

## 2017-05-11 NOTE — PLAN OF CARE
Maintains optimal cardiac output and hemodynamic stability Progressing    VSS, maintaining SR, brief episodes SB w/rates to high 50's. Cardiac meds adjusted per MD order. Absence of cardiac arrhythmias or at baseline Progressing    No ectopy noted.

## 2017-05-11 NOTE — PROGRESS NOTES
[facility] Cardiology  Progress Note    Kush Graves Patient Status:  Inpatient    1945 MRN [id number]   Location [facility] 2W/SW Attending Horacio Mendiola MD   [facility] Day # 9 PCP Jennifer Rothman MD     Assessment and Plan:     1.  Atrial fibr 138   K  3.3  3.7  3.7   CL  104  104  106   CO2  28  28  25     No results for input(s): TROP, CK in the last 72 hours.   Recent Labs   Lab  05/10/17   0602  05/11/17   0606   RBC  3.45*   --    HGB  9.8*   --    HCT  29.8*   --    MCV  86.3   --    MCH  2

## 2017-05-11 NOTE — PLAN OF CARE
Achieve highest/safest level of mobility/gait Not Progressing    Pt not out of bed this am, tired, sleeping intermittently. Pt was 2-person assist getting up 5/10.  Pt moves arms easily but does not move legs as much, can bend them easily but does not use m

## 2017-05-11 NOTE — DIETARY NOTE
ADULT NUTRITION RE-ASSESSMENT    Pt is at moderate nutrition risk. Pt does not meet malnutrition criteria.       RECOMMENDATIONS TO MD: RD to order and manage oral nutritional supplementation(ONS)     NUTRITION DIAGNOSIS/PROBLEM:  Inadequate oral intake re usual weight    WEIGHT HISTORY:  Patient Weight(s) for the past 336 hrs:   Weight   05/10/17 0400 92.851 kg (204 lb 11.2 oz)   05/08/17 0808 88.451 kg (195 lb)       GASTROINTESTINAL PROBLEMS: diarrhea    FOOD/NUTRITION RELATED HISTORY:  Appetite: Fair and

## 2017-05-11 NOTE — PLAN OF CARE
Maintains or returns to baseline bowel function Not Progressing    Pt continues w/liquid diarrhea, order obtained for Imodium & pt started on med.    Achieve highest/safest level of mobility/gait Not Progressing    Up to chair, 2 -person assist.   Minimal o

## 2017-05-11 NOTE — PROGRESS NOTES
[facility]    Progress Note    Nargis Fonseca Patient Status:  Inpatient    1945 MRN [id number]   Location [facility] 2W/SW Attending Iar Cho MD   Hosp Day # 9 PCP Freeman Castaneda MD       Subjective:   Pt report ALT 19 05/11/2017   PTT 74.0* 05/11/2017   INR 1.6* 05/03/2017   T4F 1.22 05/04/2017   TSH 0.44* 05/07/2017   MG 1.6* 05/11/2017   PHOS 3.6 05/11/2017   TROP 0.01 05/06/2017                   Braden Yoder MD  5/11/2017

## 2017-05-12 PROCEDURE — 99232 SBSQ HOSP IP/OBS MODERATE 35: CPT | Performed by: INTERNAL MEDICINE

## 2017-05-12 RX ORDER — IPRATROPIUM BROMIDE AND ALBUTEROL SULFATE 2.5; .5 MG/3ML; MG/3ML
3 SOLUTION RESPIRATORY (INHALATION)
Status: DISCONTINUED | OUTPATIENT
Start: 2017-05-12 | End: 2017-05-13

## 2017-05-12 RX ORDER — POTASSIUM CHLORIDE 29.8 MG/ML
40 INJECTION INTRAVENOUS ONCE
Status: COMPLETED | OUTPATIENT
Start: 2017-05-12 | End: 2017-05-12

## 2017-05-12 RX ORDER — LOPERAMIDE HYDROCHLORIDE 2 MG/1
2 CAPSULE ORAL EVERY 2 HOUR PRN
Status: DISCONTINUED | OUTPATIENT
Start: 2017-05-12 | End: 2017-05-19

## 2017-05-12 RX ORDER — MAGNESIUM OXIDE 400 MG (241.3 MG MAGNESIUM) TABLET
800 TABLET ONCE
Status: DISCONTINUED | OUTPATIENT
Start: 2017-05-12 | End: 2017-05-12

## 2017-05-12 RX ORDER — 0.9 % SODIUM CHLORIDE 0.9 %
VIAL (ML) INJECTION
Status: DISPENSED
Start: 2017-05-12 | End: 2017-05-13

## 2017-05-12 NOTE — CM/SW NOTE
CTL on unit to see pt. Pt still had few episodes of diarrhea - reviewed note that pt would be ready for transfer to SNF when diarrhea subsides. Pt is currently on IV antibiotics - per ID anticipate pt will transition to PO antibiotics upon discharge.   Pl

## 2017-05-12 NOTE — OCCUPATIONAL THERAPY NOTE
OCCUPATIONAL THERAPY TREATMENT NOTE - INPATIENT     Room Number: 443/443-A          Presenting Problem: sepsis    Problem List  Principal Problem:    Sepsis(995.91)  Active Problems:    Essential hypertension    COPD (chronic obstructive pulmonary disease) BEARING RESTRICTION  Weight Bearing Restriction: None                PAIN ASSESSMENT  Ratin  Location: anterior thigh pain  Management Techniques:  Activity promotion     ACTIVITY TOLERANCE  Room air    ACTIVITIES OF DAILY LIVING ASSESSMENT  AM-PAC ‘6-C

## 2017-05-12 NOTE — PROGRESS NOTES
[facility]    Progress Note    Rimma James Patient Status:  Inpatient    1945 MRN [id number]   Location [facility] 4W/SW/SE Attending Con Mayfield MD   Hosp Day # 10 PCP Johnny Johnson MD       Subjective:   Pt re PTT 74.0* 05/11/2017   INR 1.6* 05/03/2017   T4F 1.22 05/04/2017   TSH 0.44* 05/07/2017   MG 1.4* 05/12/2017   PHOS 3.3 05/12/2017   TROP 0.01 05/06/2017                   Zari Davey MD  5/12/2017

## 2017-05-12 NOTE — OCCUPATIONAL THERAPY NOTE
OCCUPATIONAL THERAPY TREATMENT NOTE - INPATIENT     Room Number: 443/443-A     Presenting Problem: sepsis    Problem List  Principal Problem:    Sepsis(995.91)  Active Problems:    Essential hypertension    COPD (chronic obstructive pulmonary disease) ([address] another person does the patient currently need…  -   Putting on and taking off regular lower body clothing?: A Lot  -   Bathing (including washing, rinsing, drying)?: A Lot  -   Toileting, which includes using toilet, bedpan or urinal? : A Lot  -   Putting

## 2017-05-12 NOTE — PROGRESS NOTES
[facility] Cardiology  Progress Note    Anette Leiva Patient Status:  Inpatient    1945 MRN [id number]   Location [facility] 2W/SW Attending Adama Arellano MD   [facility] Day # 10 PCP Heydi Correia MD     Assessment and Plan:     1.  Atrial fib 138  135*   K  3.7  3.7  3.3   CL  104  106  104   CO2  28  25  27     No results for input(s): TROP, CK in the last 72 hours.   Recent Labs   Lab  05/12/17   0426   RBC  3.14*   HGB  8.8*   HCT  26.9*   MCV  85.6   MCH  27.9   MCHC  32.6   RDW  14.3   WBC

## 2017-05-12 NOTE — PROGRESS NOTES
32 [facility] Infectious Disease Progress Note    Molly Brady Patient Status:  Inpatient    1945 MRN [id number]   Location [facility] 4W/SW/SE Attending Supriya Gilmore MD   Hosp Day # 10 PCP MD Sumeet Tubbs Sod-Tazobactam So (ZOSYN) 3.375 g in dextrose 5 % 100 mL IVPB, 3.375 g, Intravenous, Q8H    Physical Exam:  General: Alert, No apparent distress. Vital Signs:  Blood pressure 131/49, pulse 92, temperature 98.8 °F (37.1 °C), temperature source Oral, resp. on PO abx    If you have any questions or concerns please call G-ID at [phone].      TIMBO MENON NP  5/4/2017  10:51 AM

## 2017-05-12 NOTE — PHYSICAL THERAPY NOTE
PHYSICAL THERAPY TREATMENT NOTE - INPATIENT    Room Number: 443/443-A       Presenting Problem: Failure to Thrive    Problem List  Principal Problem:    Sepsis(995.91)  Active Problems:    Essential hypertension    COPD (chronic obstructive pulmonary dise Score: 15   PT Approx Degree of Impairment Score: 57.7%   Standardized Score (AM-PAC Scale): 39.45   CMS Modifier (G-Code): CK    FUNCTIONAL ABILITY STATUS  Gait Assessment   Gait Assistance:  Moderate assistance  Distance (ft): 10', 20'  Assistive Device:      Goal #6    Current Status

## 2017-05-13 PROCEDURE — 99232 SBSQ HOSP IP/OBS MODERATE 35: CPT | Performed by: INTERNAL MEDICINE

## 2017-05-13 RX ORDER — POTASSIUM CHLORIDE 29.8 MG/ML
40 INJECTION INTRAVENOUS ONCE
Status: COMPLETED | OUTPATIENT
Start: 2017-05-13 | End: 2017-05-13

## 2017-05-13 RX ORDER — DICYCLOMINE HYDROCHLORIDE 10 MG/1
10 CAPSULE ORAL EVERY 4 HOURS PRN
Status: DISCONTINUED | OUTPATIENT
Start: 2017-05-13 | End: 2017-05-19

## 2017-05-13 RX ORDER — SODIUM CHLORIDE 9 MG/ML
INJECTION, SOLUTION INTRAVENOUS
Status: COMPLETED
Start: 2017-05-13 | End: 2017-05-13

## 2017-05-13 RX ORDER — SACCHAROMYCES BOULARDII 250 MG
250 CAPSULE ORAL 2 TIMES DAILY
Status: DISCONTINUED | OUTPATIENT
Start: 2017-05-13 | End: 2017-05-19

## 2017-05-13 NOTE — RESTORATIVE THERAPY
RESTORATIVE CARE TREATMENT NOTE    Presenting Problem  Presenting Problem: Failure to Thrive  Presenting Problem: sepsis       Precautions          Weight Bearing Restriction  Weight Bearing Restriction: None                   SUNDAY MONDAY TUESDAY Sidney Au

## 2017-05-13 NOTE — PROGRESS NOTES
[facility] Cardiology  Progress Note    Amanuel Sheehan Patient Status:  Inpatient    1945 MRN [id number]   Location [facility] 2W/SW Attending Trevor Lopez MD   [facility] Day # 11 PCP Sherryle Simmering, MD     Assessment and Plan:     1.  Atrial fib NA  138  135*  133*   K  3.7  3.3  3.3   CL  106  104  101   CO2  25  27  26     No results for input(s): TROP, CK in the last 72 hours.   Recent Labs   Lab  05/13/17   0518   RBC  3.23*   HGB  9.1*   HCT  27.6*   MCV  85.4   MCH  28.2   MCHC  33.0   RDW

## 2017-05-13 NOTE — PROGRESS NOTES
[facility]    Progress Note    Debra Nola Patient Status:  Inpatient    1945 MRN [id number]   Location [facility] 4W/SW/SE Attending Alize Chavez MD   Hosp Day # 10 PCP Elvis Anguiano MD      [facility] Genitourinary: Bolden in place   Musculoskeletal: Normal range of motion. She exhibits no tenderness. Lymphadenopathy:     She has no cervical adenopathy. Neurological: She is alert and oriented to person, place, and time. No cranial nerve deficit.

## 2017-05-13 NOTE — PROGRESS NOTES
[facility]    Progress Note    Elvira Acuña Patient Status:  Inpatient    1945 MRN [id number]   Location [facility] 4W/SW/SE Attending Zane Horn MD   [address] Day # 11 PCP Jacey Vu MD     Subjective:   Alma Boswell abdominal cramps.       Bacteremia due to Gram-negative bacteria  E Coli positive blood culture - on zosyn      Atrial flutter / A fib    Controlled per cardiology        Results:         Recent Results (from the past 24 hour(s))  -COMP METABOLIC PANEL (14)

## 2017-05-13 NOTE — PROGRESS NOTES
[facility]    Progress Note     Patient Status:  Inpatient    1945 MRN [id number]   Location [facility] 4W/SW/SE Attending Harriet Lopez MD   Hosp Day # 6 PCP Hunter Rivera MD       Subjective:     Beverly Haider 05/13/2017   ALKPHO 41 05/13/2017   BILT 0.6 05/13/2017   TP 4.3* 05/13/2017   AST 27 05/13/2017   ALT 16 05/13/2017   PTT 74.0* 05/11/2017   INR 1.6* 05/03/2017   T4F 1.22 05/04/2017   TSH 0.44* 05/07/2017   MG 1.9 05/13/2017   PHOS 2.8 05/13/2017   TROP

## 2017-05-13 NOTE — PROGRESS NOTES
32 [facility] Infectious Disease Progress Note    [place] Patient Status:  Inpatient    1945 MRN [id number]   Location [facility] 4W/SW/SE Attending Aylin Luis MD   [address] Day # 6 PCP MD Jocelynn Vega Blood pressure 126/50, pulse 75, temperature 98.5 °F (36.9 °C), temperature source Oral, resp. rate 20, weight 204 lb 11.2 oz (92.851 kg), SpO2 92 %.    Temp (24hrs), Av.6 °F (37 °C), Min:97.6 °F (36.4 °C), Max:99.5 °F (37.5 °C)      HEENT: Exam is unre TIMBO MENON NP  5/4/2017  10:51 AM

## 2017-05-14 RX ORDER — MAGNESIUM OXIDE 400 MG (241.3 MG MAGNESIUM) TABLET
400 TABLET ONCE
Status: COMPLETED | OUTPATIENT
Start: 2017-05-14 | End: 2017-05-14

## 2017-05-14 RX ORDER — POTASSIUM CHLORIDE 29.8 MG/ML
40 INJECTION INTRAVENOUS ONCE
Status: COMPLETED | OUTPATIENT
Start: 2017-05-14 | End: 2017-05-14

## 2017-05-14 RX ORDER — POTASSIUM CHLORIDE 14.9 MG/ML
20 INJECTION INTRAVENOUS ONCE
Status: COMPLETED | OUTPATIENT
Start: 2017-05-14 | End: 2017-05-14

## 2017-05-14 NOTE — PROGRESS NOTES
[facility]    Progress Note    Kati Mallory Patient Status:  Inpatient    1945 MRN [id number]   Location [facility] 4W/SW/SE Attending Angelica Zhong MD   Hosp Day # 12 PCP Brendan Reis MD       Subjective:     Cooper Vogt

## 2017-05-14 NOTE — PROGRESS NOTES
32 [facility] Infectious Disease Progress Note    Christine Lamas Patient Status:  Inpatient    1945 MRN [id number]   Location [facility] 4W/SW/SE Attending Forrest Valerio MD   Hosp Day # 15 PCP MD Ryan Delacruz (TYLENOL) tab 650 mg, 650 mg, Oral, Q6H PRN    Physical Exam:  General: Alert, orientated x3. Cooperative. No apparent distress. Vital Signs:  Blood pressure 129/67, pulse 79, temperature 97 °F (36.1 °C), temperature source Oral, resp.  rate 18, weight 2 negative  - If further blood cultures positive, would have to consider port involvement  - CT with colitis and other issues related to her malignancy  - p.o.  Vancomycin prophy ongoing, c.diff negative, stool panel negative  - IV Cefazolin ongoing too, d #

## 2017-05-14 NOTE — PROGRESS NOTES
[facility] Cardiology  Progress Note    [place] Patient Status:  Inpatient    1945 MRN [id number]   Location [facility] 2W/SW Attending Maury Villa MD   [facility] Day # 12 PCP Ximena Harris MD     Assessment and Plan:     1.  Atrial fib 7. 6*  7.5*  7.4*   NA  135*  133*  133*   K  3.3  3.3  3.0*   CL  104  101  100   CO2  27  26  28     No results for input(s): TROP, CK in the last 72 hours.   Recent Labs   Lab  05/14/17   0533   RBC  3.25*   HGB  9.1*   HCT  27.4*   MCV  84.3   MCH  27.9

## 2017-05-15 PROCEDURE — 99231 SBSQ HOSP IP/OBS SF/LOW 25: CPT | Performed by: INTERNAL MEDICINE

## 2017-05-15 RX ORDER — POTASSIUM CHLORIDE 20 MEQ/1
40 TABLET, EXTENDED RELEASE ORAL EVERY 4 HOURS
Status: DISPENSED | OUTPATIENT
Start: 2017-05-15 | End: 2017-05-15

## 2017-05-15 RX ORDER — POTASSIUM CHLORIDE 20 MEQ/1
40 TABLET, EXTENDED RELEASE ORAL 2 TIMES DAILY
Qty: 60 TABLET | Refills: 3 | Status: SHIPPED | OUTPATIENT
Start: 2017-05-15 | End: 2017-06-12 | Stop reason: ALTCHOICE

## 2017-05-15 RX ORDER — SACCHAROMYCES BOULARDII 250 MG
250 CAPSULE ORAL 2 TIMES DAILY
Qty: 60 CAPSULE | Refills: 3 | Status: SHIPPED | OUTPATIENT
Start: 2017-05-15 | End: 2017-05-19

## 2017-05-15 RX ORDER — CALCIUM CARBONATE 200(500)MG
500 TABLET,CHEWABLE ORAL 3 TIMES DAILY PRN
Qty: 90 TABLET | Refills: 3 | Status: SHIPPED | OUTPATIENT
Start: 2017-05-15

## 2017-05-15 RX ORDER — LOPERAMIDE HYDROCHLORIDE 2 MG/1
2 CAPSULE ORAL 4 TIMES DAILY PRN
Status: DISCONTINUED | OUTPATIENT
Start: 2017-05-15 | End: 2017-05-17

## 2017-05-15 RX ORDER — MAGNESIUM OXIDE 400 MG (241.3 MG MAGNESIUM) TABLET
400 TABLET ONCE
Status: COMPLETED | OUTPATIENT
Start: 2017-05-15 | End: 2017-05-15

## 2017-05-15 RX ORDER — LOPERAMIDE HYDROCHLORIDE 2 MG/1
2 CAPSULE ORAL EVERY 2 HOUR PRN
Qty: 30 CAPSULE | Refills: 3 | Status: SHIPPED | OUTPATIENT
Start: 2017-05-15 | End: 2017-05-19

## 2017-05-15 RX ORDER — FUROSEMIDE 20 MG/1
20 TABLET ORAL DAILY
Qty: 30 TABLET | Refills: 6 | Status: SHIPPED | OUTPATIENT
Start: 2017-05-15 | End: 2017-05-18

## 2017-05-15 RX ORDER — AMIODARONE HYDROCHLORIDE 200 MG/1
200 TABLET ORAL DAILY
Qty: 30 TABLET | Refills: 6 | Status: SHIPPED | OUTPATIENT
Start: 2017-05-15 | End: 2017-06-09

## 2017-05-15 RX ORDER — POTASSIUM CHLORIDE 14.9 MG/ML
20 INJECTION INTRAVENOUS ONCE
Status: COMPLETED | OUTPATIENT
Start: 2017-05-15 | End: 2017-05-16

## 2017-05-15 RX ORDER — SODIUM CHLORIDE 9 MG/ML
INJECTION, SOLUTION INTRAVENOUS
Status: COMPLETED
Start: 2017-05-15 | End: 2017-05-15

## 2017-05-15 RX ORDER — PANTOPRAZOLE SODIUM 40 MG/1
40 TABLET, DELAYED RELEASE ORAL
Qty: 30 TABLET | Refills: 3 | Status: SHIPPED | OUTPATIENT
Start: 2017-05-15 | End: 2017-06-19 | Stop reason: ALTCHOICE

## 2017-05-15 NOTE — PROGRESS NOTES
[facility] Cardiology  Progress Note    Lucina Ramos Patient Status:  Inpatient    1945 MRN [id number]   Location [facility] 2W/SW Attending Troy Huber MD   [facility] Day # 15 PCP Melanie Goldberg MD     Assessment and Plan:     1.  Atrial fib 33.3   RDW  14.4   WBC  11.1*   PLT  215

## 2017-05-15 NOTE — RESTORATIVE THERAPY
RESTORATIVE CARE TREATMENT NOTE    Presenting Problem  Presenting Problem: Failure to Thrive  Presenting Problem: sepsis       Precautions          Weight Bearing Restriction  Weight Bearing Restriction: None                   SUNDAY MONDAY TUESDAY Suzy Cifuentes

## 2017-05-15 NOTE — OCCUPATIONAL THERAPY NOTE
Attempted to see pt for OT session, however pt declined at this time. Pt having diarrhea, had just been cleaned, and too fatigued. RN Rigoberto Lantigua aware, will continue to follow.

## 2017-05-15 NOTE — PROGRESS NOTES
[facility]    Progress Note    Nargis Fonseca Patient Status:  Inpatient    1945 MRN [id number]   Location [facility] 4W/SW/SE Attending Ira Cho MD   Hosp Day # 13 PCP Freeman Castaneda MD     Subjective:     Con coli    Medically stable. Plan on transfer to [facility] today  Pt will f/u with oncology to resumes chemo when discharged from rehab.   Remains in nsr  Meds per med reconciliation    Augusta Isidro MD  5/15/2017

## 2017-05-15 NOTE — PHYSICAL THERAPY NOTE
Pt having continuous BM's all day extremely tired & unwilling to participate in out of bed therapy.  Pt agreeable to restorative bed exercises

## 2017-05-15 NOTE — PROGRESS NOTES
32 [facility] Infectious Disease Progress Note    Sonjia Schaumann Patient Status:  Inpatient    1945 MRN [id number]   Location [facility] 4W/SW/SE Attending Laura Martinez MD   [facility] Day # 15 PCP MD Ger Tenorio Daily  •  sucralfate (CARAFATE) 1 GM/10ML suspension 1 g, 1 g, Oral, TID AC and HS  •  acetaminophen (TYLENOL) tab 650 mg, 650 mg, Oral, Q6H PRN    Physical Exam:  General: Alert, orientated x3. Cooperative. No apparent distress.   Vital Signs:  Blood pre pyuria due to neutropenia  - 1/2 blood cultures 5/2 positive for pansensitive e.coli, repeats negative  - CT with colitis and other issues related to her malignancy  - p.o.  Vancomycin prophy ongoing, c.diff negative, stool panel negative  - IV Cefazolin on

## 2017-05-16 ENCOUNTER — SURGERY (OUTPATIENT)
Age: 72
End: 2017-05-16

## 2017-05-16 PROCEDURE — 0D9N8ZX DRAINAGE OF SIGMOID COLON, VIA NATURAL OR ARTIFICIAL OPENING ENDOSCOPIC, DIAGNOSTIC: ICD-10-PCS | Performed by: INTERNAL MEDICINE

## 2017-05-16 PROCEDURE — 99232 SBSQ HOSP IP/OBS MODERATE 35: CPT | Performed by: INTERNAL MEDICINE

## 2017-05-16 RX ORDER — MAGNESIUM OXIDE 400 MG (241.3 MG MAGNESIUM) TABLET
400 TABLET ONCE
Status: DISCONTINUED | OUTPATIENT
Start: 2017-05-16 | End: 2017-05-16

## 2017-05-16 RX ORDER — POTASSIUM CHLORIDE 29.8 MG/ML
40 INJECTION INTRAVENOUS ONCE
Status: COMPLETED | OUTPATIENT
Start: 2017-05-16 | End: 2017-05-16

## 2017-05-16 RX ORDER — DIPHENOXYLATE HCL/ATROPINE 2.5-.025/5
5 LIQUID (ML) ORAL 4 TIMES DAILY PRN
Status: DISCONTINUED | OUTPATIENT
Start: 2017-05-16 | End: 2017-05-17 | Stop reason: SDUPTHER

## 2017-05-16 RX ORDER — SODIUM CHLORIDE 9 MG/ML
INJECTION, SOLUTION INTRAVENOUS
Status: COMPLETED
Start: 2017-05-16 | End: 2017-05-16

## 2017-05-16 RX ORDER — MIDAZOLAM HYDROCHLORIDE 1 MG/ML
INJECTION INTRAMUSCULAR; INTRAVENOUS
Status: DISCONTINUED | OUTPATIENT
Start: 2017-05-16 | End: 2017-05-16 | Stop reason: HOSPADM

## 2017-05-16 NOTE — H&P
PRE-PROCEDURE HISTORY & PHYSICAL    HPI: Carmen Sewell is a 73year old female. 9/7/1945.     Patient presents for a Flexible Sigmoidoscopy for diarrhea  ALLERGIES: No Known Allergies      Current Outpatient Prescriptions:  Calcium Carbonate Antacid 500 murmur appreciated. ABDOMEN:   Bowel sounds normoactive. Soft, no organomegaly or masses appreciated. Nontender. IMPRESSION:  diarrhea    PLAN:  No significant changes except as outlined above.      Flexible Sigmoidoscopy with possible biopsy/polypectom

## 2017-05-16 NOTE — PROGRESS NOTES
[facility] Cardiology  Progress Note    Vicki Flynn Patient Status:  Inpatient    1945 MRN [id number]   Location [facility] 2W/SW Attending Malina Yates MD   [address] Day # 15 PCP Karin Canela MD     Assessment and Plan:     1.  Atrial fib results for input(s): TROP, CK in the last 72 hours.   Recent Labs   Lab  05/16/17   0535   RBC  3.15*   HGB  8.9*   HCT  26.7*   MCV  84.7   MCH  28.4   MCHC  33.5   RDW  14.1   WBC  8.6   PLT  282

## 2017-05-16 NOTE — CONSULTS
[facility]    Report of Consultation    Amirah Wilkerson Patient Status:  Inpatient    1945 MRN [id number]   Location [facility] 4W/SW/SE Attending Scotty Garcia MD   Hosp Day # 15 PCP Vito Rojas MD     Date of Admi week       Comment: one per month         Current Medications:    Current Facility-Administered Medications:  diphenoxylate-atropine (LOMOTIL) liquid 5 mL 5 mL Oral QID PRN   Loperamide HCl (IMODIUM) cap 2 mg 2 mg Oral QID PRN   ceFAZolin (ANCEF) IVPB 1g/1 Suspension by Each Nare route daily. nicotine polacrilex (NICORETTE STARTER KIT) 2 MG Mouth/Throat Gum Take 2 mg by mouth as needed for Smoking cessation. mirtazapine 15 MG Oral Tab Take 1 tablet (15 mg total) by mouth nightly.    Ondansetron HCl (ZOFRA colitis/enteritis. Antibiotic associated and/or opportunistic infection also possibility. Flex sig will be done today with biopsy  Agree with management thus far.   Will check fecal fat as well although pancreatic insufficiency as a cause of this type of d

## 2017-05-16 NOTE — PROGRESS NOTES
32 [facility] Infectious Disease  Progress Note    Jamespratik Hensley Patient Status:  Inpatient    1945 MRN [id number]   Location [facility] ENDOSCOPY LAB SUITES Attending Dahiana Patel MD   [address] Day # 15 PCP Sonia Childress MD appearance of the pancreas on 3/3/17 outside institution MRI. 2.  Common bile duct dilatation without significant intrahepatic ductal dilatation.  Findings suggest age-related aeration however correlate for biliary lab abnormality. Cholelithiasis.   3.  He patient's diarrhea. F/u pending scope results as well. Will follow. Ashleigh Cardenas Points  [facility] Infectious Disease  [phone]    5/16/2017  2:41 PM

## 2017-05-16 NOTE — RESTORATIVE THERAPY
RESTORATIVE CARE TREATMENT NOTE    Presenting Problem  Presenting Problem: Failure to Thrive  Presenting Problem: sepsis       Precautions          Weight Bearing Restriction  Weight Bearing Restriction: None                   SUNDAY MONDAY TUESDAY Gio Mattson

## 2017-05-16 NOTE — PROGRESS NOTES
[facility]  Hematology/Oncology  Progress Note    Saint Ohs Patient Status:  Inpatient    1945 MRN [id number]   Location [facility] 4W/SW/SE Attending Belem Odell MD   Hosp Day # 15 PCP Johana Jon MD     Subjective: chemotherapy     Chemotherapy management, encounter for     Dehydration     Rectal bleeding     Malignant neoplasm of pancreas, unspecified location of malignancy (HCC)     Anemia     Pancytopenia (HCC)     Vomiting     Diarrhea     Mucositis due to antine currently controlled on prn imodium, discussed escalation of care would include using lomotil if indicated  --Patient has not require the use of Lomotil today and has had no diarrhea in 5 hrs      3.) Sepsis due to E.  Coli in blood    --She has evidence of

## 2017-05-16 NOTE — PROGRESS NOTES
[facility]    Progress Note    Mayda Harrell Patient Status:  Inpatient    1945 MRN [id number]   Location [facility] 4W/SW/SE Attending Brenda Huston MD   Hosp Day # 15 PCP Jesus Jovel MD     Subjective:   Dorinda Johnson Coli positive blood culture   Previous Rx with zosyn - continuing on cefazolin       Atrial flutter / A fib    Controlled per cardiology       Pancytopenia due to chemotherapy [facility])  Resolved      Results:     Lab Results  Component Value Date   WBC 11.1*

## 2017-05-16 NOTE — OPERATIVE REPORT
COLONOSCOPY REPORT    Patient Name:  Darrell Truong Record #: [id number]  YOB: 1945  Date of Procedure: 5/16/2017    Referring physician: Adonay Goodman MD     Flexible sigmoidoscopy to 70 cm with biopsy and stool aspiration for c

## 2017-05-16 NOTE — PHYSICAL THERAPY NOTE
Chart reviewed and RN approved session but notes that pt has been having loose stools and may not want to get up with PT. RN said pt may consent to sit EOB.  PT approached patient who was supine in bed and states that she 'already did exercises' (with Haydee

## 2017-05-16 NOTE — PROGRESS NOTES
[facility]    [facility] Note    Easton Art Patient Status:  Inpatient    1945 MRN [id number]   Location [facility] 4W/SW/SE Attending Alma Coronado MD   Hosp Day # 14 PCP Renee Monge MD     Subjective:     Con to [facility] yesterday. However [facility] is not on hospital formulary. We'll ask ID for alternative. Remains in normal sinus rhythm.   Continues to require assistance and will benefit from continued physical therapy--plan on transfer to rehabilitation once

## 2017-05-17 RX ORDER — SODIUM CHLORIDE 9 MG/ML
INJECTION, SOLUTION INTRAVENOUS
Status: COMPLETED
Start: 2017-05-17 | End: 2017-05-17

## 2017-05-17 RX ORDER — DIPHENOXYLATE HYDROCHLORIDE AND ATROPINE SULFATE 2.5; .025 MG/1; MG/1
1 TABLET ORAL 4 TIMES DAILY PRN
Status: DISCONTINUED | OUTPATIENT
Start: 2017-05-17 | End: 2017-05-19

## 2017-05-17 RX ORDER — MAGNESIUM SULFATE HEPTAHYDRATE 40 MG/ML
2 INJECTION, SOLUTION INTRAVENOUS ONCE
Status: COMPLETED | OUTPATIENT
Start: 2017-05-17 | End: 2017-05-17

## 2017-05-17 RX ORDER — LOPERAMIDE HYDROCHLORIDE 2 MG/1
2 CAPSULE ORAL EVERY 8 HOURS
Status: DISCONTINUED | OUTPATIENT
Start: 2017-05-17 | End: 2017-05-18

## 2017-05-17 NOTE — PROGRESS NOTES
[facility]    Progress Note    Elvira Acuña Patient Status:  Inpatient    1945 MRN [id number]   Location [facility] 4W/SW/SE Attending Zane Horn MD   Hosp Day # 13 PCP Jacey Vu MD       Subjective:   Palmer Uriarte

## 2017-05-17 NOTE — OCCUPATIONAL THERAPY NOTE
OCCUPATIONAL THERAPY TREATMENT NOTE - INPATIENT     Room Number: 443/443-A          Presenting Problem: sepsis    Problem List  Principal Problem:    Sepsis(995.91)  Active Problems:    Essential hypertension    COPD (chronic obstructive pulmonary disease) Inpatient Daily Activity Short Form  How much help from another person does the patient currently need…  -   Putting on and taking off regular lower body clothing?: A Lot  -   Bathing (including washing, rinsing, drying)?: A Lot  -   Toileting, which inclu

## 2017-05-17 NOTE — PROGRESS NOTES
[facility] Cardiology  Progress Note    [facility] Patient Status:  Inpatient    1945 MRN [id number]   Location [facility] 2W/SW Attending Harshad Guy MD   [facility] Day # 15 PCP Ashley Trinidad MD     Assessment and Plan:     1.  Atrial fib MCV  83.6   MCH  28.1   MCHC  33.6   RDW  13.9   WBC  7.3   PLT  332

## 2017-05-17 NOTE — PROGRESS NOTES
[facility]    Progress Note    Mayda Harrell Patient Status:  Inpatient    1945 MRN [id number]   Location [facility] 4W/SW/SE Attending Brenda Huston MD   Hosp Day # 13 PCP Jesus Jovel MD       Subjective:   [place] BILT 0.7 05/15/2017   TP 4.7* 05/15/2017   AST 20 05/15/2017   ALT 14 05/15/2017   PTT 74.0* 05/11/2017   INR 1.6* 05/03/2017   T4F 1.22 05/04/2017   TSH 0.44* 05/07/2017   MG 1.6* 05/17/2017   PHOS 3.0 05/15/2017   TROP 0.01 05/06/2017

## 2017-05-17 NOTE — PROGRESS NOTES
32 [facility] Infectious Disease  Progress Note    Lucina Ramos Patient Status:  Inpatient    1945 MRN [id number]   Location [facility] 4W/SW/SE Attending Troy Huber MD   Hosp Day # 13 PCP MD Kath Justin The pancreatic duct within this region is not clearly seen/effaced and distally the pancreatic duct is dilated.  This may represent localized edema of the pancreas as seen with acute pancreatitis, however suspicious for pancreatic tumor (possibly adenocarc continues, worsening leukocytosis too  - WBCs normalized but diarrhea persists  - Sigmoid scope with significant colitis - does not appear c/w bacterial or viral process  - f/u pending pathology  - Add CMV studies with IgM, IgG and PCR    6.  Disposition -

## 2017-05-17 NOTE — CM/SW NOTE
CTL discharge rounds:  Most recent discharge plan - per RN GI MD would like pt to be discharged in 1-2 days d/t frequent diarrhea and electrolyte imbalance. CTL to follow.

## 2017-05-18 PROCEDURE — 99232 SBSQ HOSP IP/OBS MODERATE 35: CPT | Performed by: INTERNAL MEDICINE

## 2017-05-18 RX ORDER — DIPHENOXYLATE HYDROCHLORIDE AND ATROPINE SULFATE 2.5; .025 MG/1; MG/1
2 TABLET ORAL 4 TIMES DAILY
Status: DISCONTINUED | OUTPATIENT
Start: 2017-05-18 | End: 2017-05-19

## 2017-05-18 NOTE — PROGRESS NOTES
[facility]    Progress Note    Mack Ly Patient Status:  Inpatient    1945 MRN [id number]   Location [facility] 4W/SW/SE Attending Porsche Adair MD   Hosp Day # 12 PCP Kb Obrien MD       Subjective:   Pt re 05/15/2017   AST 20 05/15/2017   ALT 14 05/15/2017   PTT 74.0* 05/11/2017   INR 1.6* 05/03/2017   T4F 1.22 05/04/2017   TSH 0.44* 05/07/2017   MG 1.6* 05/17/2017   PHOS 3.0 05/15/2017   TROP 0.01 05/06/2017                   Maddison Camacho MD  5/18/201

## 2017-05-18 NOTE — PHYSICAL THERAPY NOTE
PHYSICAL THERAPY TREATMENT NOTE - INPATIENT    Room Number: 443/443-A       Presenting Problem: Failure to Thrive    Problem List  Principal Problem:    Sepsis(995.91)  Active Problems:    Essential hypertension    COPD (chronic obstructive pulmonary dise commode, etc.): A Little   -   Moving from lying on back to sitting on the side of the bed?: A Little   How much help from another person does the patient currently need. ..   -   Moving to and from a bed to a chair (including a wheelchair)?: A Little   -

## 2017-05-18 NOTE — CM/SW NOTE
CTL rounds - pt's diarrhea is subsiding - discharge plan:  Pt to be discharged to SNF when medically cleared. Spoke with RN - no plan for d/c today. BMP, magnesium and CBC ordered today.   Anticipate discharge on Friday pending lab resullts and decrease i

## 2017-05-18 NOTE — PROGRESS NOTES
[facility]  Hematology/Oncology  Progress Note    Suzette Sharif Patient Status:  Inpatient    1945 MRN [id number]   Location [facility] 4W/SW/SE Attending Mariann Alford MD   Hosp Day # 12 PCP Eileen Irizarry MD     Subjective: pancreas [facility])     Encounter for antineoplastic chemotherapy     Chemotherapy management, encounter for     Dehydration     Rectal bleeding     Malignant neoplasm of pancreas, unspecified location of malignancy ([facility]     Anemia     Pancytopenia ([facility]     V C.Diff  --Sigmoidoscopy with evidence of colitis; stool panel sent, results are negative for any infectious causes  --Pt's diarrhea is currently controlled on prn imodium, discussed escalation of care would include using lomotil if indicated  --Patient has

## 2017-05-18 NOTE — DIETARY NOTE
ADULT NUTRITION RE-ASSESSMENT    Pt is at moderate nutrition risk. Pt does not meet malnutrition criteria.       RECOMMENDATIONS TO MD: RD to order and manage oral nutritional supplementation(ONS)     NUTRITION DIAGNOSIS/PROBLEM:  Inadequate oral intake re 66.5\"  WT:   204.7 lbs  No new weight since 5/10/17  BMI: Body mass index is 32.55 kg/(m^2). BMI CLASSIFICATION: 30-34.9 kg/m2 - obesity class I  IBW: 132 lbs    Now 155 % IBW  Usual Body Wt: 185-195 lbs per patient.   Now 105% of usual weight    WEIGHT H greater than 75% of needs, return to normal GI function and labs WNL      DIETITIAN FOLLOW UP: RD to follow up within 7 days   [address], [address] [facility]  Ext [phone]

## 2017-05-18 NOTE — PROGRESS NOTES
[facility]    Progress Note    Molly Brady Patient Status:  Inpatient    1945 MRN [id number]   Location [facility] 4W/SW/SE Attending Supriya Gilmore MD   Hosp Day # 12 PCP Damaris Millan MD       Subjective:   Primus Ou

## 2017-05-18 NOTE — PROGRESS NOTES
32 [facility] Infectious Disease Progress Note    Debra Vaca Patient Status:  Inpatient    1945 MRN [id number]   Location [facility] 4W/SW/SE Attending Alize Chavez MD   [facility] Day # 12 PCP MD Juany Rosales Oral, resp. rate 18, height 5' 6.5\" (1.689 m), weight 204 lb 11.2 oz (92.851 kg), SpO2 97 %. Temp (24hrs), Av.6 °F (37 °C), Min:97.6 °F (36.4 °C), Max:99.5 °F (37.5 °C)      HEENT: Exam is unremarkable. Without scleral icterus.   Mucous membranes ar

## 2017-05-18 NOTE — OCCUPATIONAL THERAPY NOTE
OCCUPATIONAL THERAPY TREATMENT NOTE - INPATIENT     Room Number: 443/443-A          Presenting Problem: sepsis    Problem List  Principal Problem:    Sepsis(995.91)  Active Problems:    Essential hypertension    COPD (chronic obstructive pulmonary disease) Form  How much help from another person does the patient currently need…  -   Putting on and taking off regular lower body clothing?: A Little  -   Bathing (including washing, rinsing, drying)?: A Little  -   Toileting, which includes using toilet, bedpan

## 2017-05-19 ENCOUNTER — APPOINTMENT (OUTPATIENT)
Dept: HEMATOLOGY/ONCOLOGY | Facility: HOSPITAL | Age: 72
End: 2017-05-19
Attending: INTERNAL MEDICINE
Payer: MEDICARE

## 2017-05-19 VITALS
BODY MASS INDEX: 32.51 KG/M2 | HEART RATE: 88 BPM | SYSTOLIC BLOOD PRESSURE: 120 MMHG | DIASTOLIC BLOOD PRESSURE: 51 MMHG | TEMPERATURE: 97 F | OXYGEN SATURATION: 93 % | HEIGHT: 66.5 IN | RESPIRATION RATE: 18 BRPM | WEIGHT: 204.69 LBS

## 2017-05-19 PROBLEM — E86.1 HYPOVOLEMIA: Status: RESOLVED | Noted: 2017-05-02 | Resolved: 2017-05-19

## 2017-05-19 PROBLEM — K12.31 MUCOSITIS DUE TO ANTINEOPLASTIC THERAPY: Status: RESOLVED | Noted: 2017-05-02 | Resolved: 2017-05-19

## 2017-05-19 PROBLEM — A41.9 SEPSIS, UNSPECIFIED: Status: RESOLVED | Noted: 2017-05-03 | Resolved: 2017-05-19

## 2017-05-19 PROBLEM — K62.5 RECTAL BLEEDING: Status: RESOLVED | Noted: 2017-04-27 | Resolved: 2017-05-19

## 2017-05-19 PROBLEM — R10.30 LOWER ABDOMINAL PAIN: Status: RESOLVED | Noted: 2017-05-03 | Resolved: 2017-05-19

## 2017-05-19 PROBLEM — E86.0 DEHYDRATION: Status: RESOLVED | Noted: 2017-04-27 | Resolved: 2017-05-19

## 2017-05-19 PROBLEM — D61.818 PANCYTOPENIA (HCC): Status: RESOLVED | Noted: 2017-04-28 | Resolved: 2017-05-19

## 2017-05-19 PROBLEM — R11.10 VOMITING: Status: RESOLVED | Noted: 2017-05-02 | Resolved: 2017-05-19

## 2017-05-19 PROBLEM — A41.9 SEPSIS, UNSPECIFIED (HCC): Status: RESOLVED | Noted: 2017-05-03 | Resolved: 2017-05-19

## 2017-05-19 PROBLEM — R78.81 BACTEREMIA DUE TO GRAM-NEGATIVE BACTERIA: Status: RESOLVED | Noted: 2017-05-03 | Resolved: 2017-05-19

## 2017-05-19 PROCEDURE — 99232 SBSQ HOSP IP/OBS MODERATE 35: CPT | Performed by: INTERNAL MEDICINE

## 2017-05-19 RX ORDER — LOPERAMIDE HYDROCHLORIDE 2 MG/1
2 CAPSULE ORAL 4 TIMES DAILY PRN
Qty: 30 CAPSULE | Refills: 3 | Status: SHIPPED | OUTPATIENT
Start: 2017-05-19 | End: 2018-01-01

## 2017-05-19 RX ORDER — 0.9 % SODIUM CHLORIDE 0.9 %
VIAL (ML) INJECTION
Status: DISCONTINUED
Start: 2017-05-19 | End: 2017-05-19

## 2017-05-19 RX ORDER — DIPHENOXYLATE HYDROCHLORIDE AND ATROPINE SULFATE 2.5; .025 MG/1; MG/1
2 TABLET ORAL 3 TIMES DAILY
Qty: 12 TABLET | Refills: 0 | Status: SHIPPED | OUTPATIENT
Start: 2017-05-19 | End: 2017-05-21

## 2017-05-19 RX ORDER — HEPARIN SODIUM (PORCINE) LOCK FLUSH IV SOLN 100 UNIT/ML 100 UNIT/ML
SOLUTION INTRAVENOUS
Status: DISCONTINUED
Start: 2017-05-19 | End: 2017-05-19

## 2017-05-19 RX ORDER — MAGNESIUM SULFATE HEPTAHYDRATE 40 MG/ML
2 INJECTION, SOLUTION INTRAVENOUS ONCE
Status: DISCONTINUED | OUTPATIENT
Start: 2017-05-19 | End: 2017-05-19

## 2017-05-19 NOTE — PROGRESS NOTES
[facility] Cardiology  Progress Note    Everet Boot Patient Status:  Inpatient    1945 MRN [id number]   Location [facility] 2W/SW Attending Du Dubon MD   [address] Day # 16 PCP Samir Vicente MD     Assessment and Plan:     1.  Atrial fib

## 2017-05-19 NOTE — PROGRESS NOTES
32 [facility] Infectious Disease  Progress Note    Milena Wright Patient Status:  Inpatient    1945 MRN [id number]   Location [facility] 4W/SW/SE Attending Danielle Abad MD   Hosp Day # 16 PCP MD Munir Rajput pancreatic duct is dilated.  This may represent localized edema of the pancreas as seen with acute pancreatitis, however suspicious for pancreatic tumor (possibly adenocarcinoma), when considering the appearance of the pancreas on 3/3/17 outside [facility] Pathology negative for CMV    6.  Disposition - stable for d/c from ID. Biopsies negative for CMV. Damon Boston for d/c to rehab - we can certainly see her at [facility] if any further infection issues present. D/w patient and nursing. Ashleigh Amaro

## 2017-05-19 NOTE — PROGRESS NOTES
[facility]    Progress Note    Amanuel Sheehan Patient Status:  Inpatient    1945 MRN [id number]   Location [facility] 4W/SW/SE Attending Trevor Lopez MD   Hosp Day # 16 PCP Sherryle Simmering, MD       Subjective:   Pt fe 05/15/2017   PTT 74.0* 05/11/2017   INR 1.6* 05/03/2017   T4F 1.22 05/04/2017   TSH 0.44* 05/07/2017   MG 1.6* 05/19/2017   PHOS 3.0 05/15/2017   TROP 0.01 05/06/2017                   Karissa Meredith MD  5/19/2017

## 2017-05-19 NOTE — DISCHARGE PLANNING
The pt. Is scheduled to discharge to [facility] today 5/19 at 1p, via [employer]. The pt. Is aware of medicar costs.  also notified the pt's sister of discharge.       Report [address] [facility], [address] [place] [address]

## 2017-05-19 NOTE — PROGRESS NOTES
[facility]  Hematology/Oncology  Progress Note    Carmen Sewell Patient Status:  Inpatient    1945 MRN [id number]   Location [facility] 4W/SW/SE Attending Rk Marina MD   Hosp Day # 16 PCP Natividad Sewell MD     Subjective: disease     Family history of colon cancer     Essential hypertension     COPD (chronic obstructive pulmonary disease) ([address]     Lymphedema of both lower extremities     Pancreatic mass     Malignant neoplasm of body of pancreas ([address]     Encounter for ant negative for C. Diff  --Sigmoidoscopy with evidence of colitis; stool panel sent, results are negative for any infectious causes  --Pt's diarrhea is currently controlled on prn imodium, discussed escalation of care would include using lomotil if indicated

## 2017-05-19 NOTE — DISCHARGE SUMMARY
[place] [facility] - [facility]    Discharge Summary    Kati Mallory Patient Status:  Inpatient    1945 MRN [id number]   Location [facility] 4W/SW/SE Attending No att. providers found   [facility] Day # 16 PCP Brendan Reis MD     Date of Admissio was discontinued during admission after repeat c diff was negative. GI was consulted and colonoscopy revealed severe ulceration. Pathology was negative for CMV. Her diarrhea was controlled with lomotil and immodium.     Her pancytopenia improved during admi Disp-60 tablet, R-0    Potassium Chloride ER 20 MEQ Oral Tab CR  Take 2 tablets (40 mEq total) by mouth 2 (two) times daily. , Normal, Disp-60 tablet, R-3    Pantoprazole Sodium 40 MG Oral Tab EC  Take 1 tablet (40 mg total) by mouth 2 (two) times daily bef Santiago Cornelius MD In 3 weeks.     Specialties:  CARDIOLOGY, Cardiac Electrophysiology    Contact information:    [address] [place] [address] [phone]                  Other Discharge Instructions:       Continue to take lomotil 3 time

## 2017-05-20 NOTE — PROGRESS NOTES
Quick Note:    5/20/2017  Amirah Wilkerson  [address] [employer] [address] [address] [facility] [address]    Dear [place],       Here are the biopsy/pathology findings from your recent Colonoscopy :    Ulceration and colitis - an inflammation of the large intestine (co

## 2017-05-22 ENCOUNTER — APPOINTMENT (OUTPATIENT)
Dept: HEMATOLOGY/ONCOLOGY | Facility: HOSPITAL | Age: 72
End: 2017-05-22
Attending: INTERNAL MEDICINE
Payer: MEDICARE

## 2017-05-22 ENCOUNTER — TELEPHONE (OUTPATIENT)
Dept: HEMATOLOGY/ONCOLOGY | Facility: HOSPITAL | Age: 72
End: 2017-05-22

## 2017-05-22 DIAGNOSIS — R69 ILL-DEFINED CONDITION: Primary | ICD-10-CM

## 2017-05-22 NOTE — TELEPHONE ENCOUNTER
Patient calling this office as directed by her physical therapist at [facility] requesting an order for PT to treat her chronic lipedema (not lymphedema) present in her lower extremities.  Though not related to her cancer, Dr Ailin Butler approves the order t

## 2017-05-22 NOTE — TELEPHONE ENCOUNTER
PLEASE CALL PT [phone] REGARDING PT IS BEING TREATED AT [facility] AND PT NEEDS AN ORDER  FOR  [facility] BHAVIN WEINBERG

## 2017-05-23 ENCOUNTER — DOCUMENTATION ONLY (OUTPATIENT)
Dept: HEMATOLOGY/ONCOLOGY | Facility: HOSPITAL | Age: 72
End: 2017-05-23

## 2017-05-24 ENCOUNTER — APPOINTMENT (OUTPATIENT)
Dept: HEMATOLOGY/ONCOLOGY | Facility: HOSPITAL | Age: 72
End: 2017-05-24
Attending: INTERNAL MEDICINE
Payer: MEDICARE

## 2017-05-31 ENCOUNTER — TELEPHONE (OUTPATIENT)
Dept: HEMATOLOGY/ONCOLOGY | Facility: HOSPITAL | Age: 72
End: 2017-05-31

## 2017-05-31 PROBLEM — C25.9 MALIGNANT NEOPLASM OF PANCREAS, UNSPECIFIED LOCATION OF MALIGNANCY (HCC): Status: RESOLVED | Noted: 2017-04-28 | Resolved: 2017-05-31

## 2017-05-31 NOTE — TELEPHONE ENCOUNTER
Per RN, patient is still a one person assist for transfers to and from chair, bed, etc. Needs assistance with ADL's. No discharge plan date determined at this time.  Requested that patient be notified of need for follow up with Dr Angie Hernandez for her cancer herminia

## 2017-06-12 ENCOUNTER — NURSE ONLY (OUTPATIENT)
Dept: HEMATOLOGY/ONCOLOGY | Facility: HOSPITAL | Age: 72
End: 2017-06-12
Attending: PHYSICIAN ASSISTANT
Payer: MEDICARE

## 2017-06-12 ENCOUNTER — APPOINTMENT (OUTPATIENT)
Dept: LAB | Facility: HOSPITAL | Age: 72
End: 2017-06-12
Attending: INTERNAL MEDICINE
Payer: MEDICARE

## 2017-06-12 ENCOUNTER — TELEPHONE (OUTPATIENT)
Dept: HEMATOLOGY/ONCOLOGY | Facility: HOSPITAL | Age: 72
End: 2017-06-12

## 2017-06-12 ENCOUNTER — OFFICE VISIT (OUTPATIENT)
Dept: HEMATOLOGY/ONCOLOGY | Facility: HOSPITAL | Age: 72
End: 2017-06-12
Attending: INTERNAL MEDICINE
Payer: MEDICARE

## 2017-06-12 VITALS
DIASTOLIC BLOOD PRESSURE: 73 MMHG | WEIGHT: 183 LBS | SYSTOLIC BLOOD PRESSURE: 118 MMHG | HEIGHT: 66.5 IN | HEART RATE: 96 BPM | RESPIRATION RATE: 16 BRPM | BODY MASS INDEX: 29.06 KG/M2 | OXYGEN SATURATION: 97 % | TEMPERATURE: 98 F

## 2017-06-12 DIAGNOSIS — C25.1 MALIGNANT NEOPLASM OF BODY OF PANCREAS (HCC): Primary | ICD-10-CM

## 2017-06-12 DIAGNOSIS — Z51.11 CHEMOTHERAPY MANAGEMENT, ENCOUNTER FOR: Primary | ICD-10-CM

## 2017-06-12 DIAGNOSIS — C25.1 MALIGNANT NEOPLASM OF BODY OF PANCREAS (HCC): ICD-10-CM

## 2017-06-12 PROCEDURE — 85025 COMPLETE CBC W/AUTO DIFF WBC: CPT

## 2017-06-12 PROCEDURE — 86301 IMMUNOASSAY TUMOR CA 19-9: CPT

## 2017-06-12 PROCEDURE — G0463 HOSPITAL OUTPT CLINIC VISIT: HCPCS | Performed by: INTERNAL MEDICINE

## 2017-06-12 PROCEDURE — 99215 OFFICE O/P EST HI 40 MIN: CPT | Performed by: INTERNAL MEDICINE

## 2017-06-12 PROCEDURE — 80053 COMPREHEN METABOLIC PANEL: CPT

## 2017-06-12 RX ORDER — 0.9 % SODIUM CHLORIDE 0.9 %
VIAL (ML) INJECTION
Status: DISCONTINUED
Start: 2017-06-12 | End: 2017-06-12

## 2017-06-12 RX ORDER — HEPARIN SODIUM (PORCINE) LOCK FLUSH IV SOLN 100 UNIT/ML 100 UNIT/ML
5 SOLUTION INTRAVENOUS ONCE
Status: DISCONTINUED | OUTPATIENT
Start: 2017-06-12 | End: 2017-06-12

## 2017-06-12 RX ORDER — HEPARIN SODIUM (PORCINE) LOCK FLUSH IV SOLN 100 UNIT/ML 100 UNIT/ML
SOLUTION INTRAVENOUS
Status: DISCONTINUED
Start: 2017-06-12 | End: 2017-06-12

## 2017-06-12 NOTE — TELEPHONE ENCOUNTER
Lorna calling and asking for a order CA19.  Patient is over at the Pending sale to [facility]. fercoh

## 2017-06-12 NOTE — PROGRESS NOTES
[facility]  Hematology/Oncology  Progress Note    Amirah Wilkerson Patient Status:  Outpatient    1945 MRN [id number]   Location [facility] Attending Tiffanie Mccormick MD   Date of Visit 17 PCP Vito Rojas MD     Chief port-a-cath placed on Thursday, complicated by a pneumothorax and is currently recovering with mild dyspnea. She was incidentally screened for hepatitis C and found to be positive.     Interval History:  Maxi Vines has spent time following hospital discharge distress. Psych: Nervous, anxious but with appropriate questions and responses  HEENT: EOMs intact. Oropharynx is clear. Neck:  No palpable lymphadenopathy. Neck is supple. Lymphatics:  There is no palpable lymphadenopathy throughout in the cervical, buchanan 8 MG tablet Take 1 tablet (8 mg total) by mouth every 8 (eight) hours as needed for Nausea. Disp: 20 tablet Rfl: 3   ALPRAZolam 1 MG Oral Tab Take 1 tablet (1 mg total) by mouth nightly as needed for Sleep.  Disp: 30 tablet Rfl: 6     No current facility-ad 1 cycle of neoadjuvant for fear notes on 4/24/17 with neulasta given on day 3 developed significant diarrhea with extended hospital course following treatment making additional chemotherapy sessions unavailable  --Clinically improved s/p rehabilitation sta and general treatment was explained to the patient and the family. Sandra Miguel MD  [facility]  [address]

## 2017-06-12 NOTE — PROGRESS NOTES
[place] was scheduled today for port flush with labs. After exam today, Dr. Dari Martin instructed pt to go to outpatient labs after [place] explained that her port does not draw blood.  Sharmila to be scheduled for port flush to co-ordinate with next exam with

## 2017-06-14 ENCOUNTER — TELEPHONE (OUTPATIENT)
Dept: HEMATOLOGY/ONCOLOGY | Facility: HOSPITAL | Age: 72
End: 2017-06-14

## 2017-06-14 RX ORDER — POTASSIUM CHLORIDE 20 MEQ/1
40 TABLET, EXTENDED RELEASE ORAL DAILY
Qty: 60 TABLET | Refills: 3 | OUTPATIENT
Start: 2017-06-14 | End: 2017-09-01

## 2017-06-14 NOTE — TELEPHONE ENCOUNTER
Veronica Leal from residential home health calling and she started home health today and also wanted to know if labs where drawn on 6/12. fercho

## 2017-06-14 NOTE — TELEPHONE ENCOUNTER
Potassium level 2.8, Dr Manoj Anne notified. Received orders for potassium prescription. Call placed to [place] who states she cannot take the oral tablet, and the liquid form is \"horrible\".  She states she was on it at the rehab facility and they sent her h

## 2017-06-15 ENCOUNTER — HOSPITAL ENCOUNTER (OUTPATIENT)
Dept: CT IMAGING | Facility: HOSPITAL | Age: 72
Discharge: HOME | End: 2017-06-15
Attending: INTERNAL MEDICINE
Payer: MEDICARE

## 2017-06-15 DIAGNOSIS — C25.1 MALIGNANT NEOPLASM OF BODY OF PANCREAS (HCC): ICD-10-CM

## 2017-06-15 PROCEDURE — 74178 CT ABD&PLV WO CNTR FLWD CNTR: CPT | Performed by: INTERNAL MEDICINE

## 2017-06-15 PROCEDURE — 71260 CT THORAX DX C+: CPT | Performed by: INTERNAL MEDICINE

## 2017-06-16 PROBLEM — E87.6 HYPOKALEMIA: Status: ACTIVE | Noted: 2017-06-16

## 2017-06-19 ENCOUNTER — OFFICE VISIT (OUTPATIENT)
Dept: HEMATOLOGY/ONCOLOGY | Facility: HOSPITAL | Age: 72
End: 2017-06-19
Attending: INTERNAL MEDICINE
Payer: MEDICARE

## 2017-06-19 ENCOUNTER — NURSE ONLY (OUTPATIENT)
Dept: HEMATOLOGY/ONCOLOGY | Facility: HOSPITAL | Age: 72
End: 2017-06-19
Attending: PHYSICIAN ASSISTANT
Payer: MEDICARE

## 2017-06-19 VITALS
RESPIRATION RATE: 16 BRPM | WEIGHT: 183 LBS | TEMPERATURE: 98 F | HEIGHT: 66.5 IN | SYSTOLIC BLOOD PRESSURE: 122 MMHG | BODY MASS INDEX: 29.06 KG/M2 | DIASTOLIC BLOOD PRESSURE: 73 MMHG | HEART RATE: 90 BPM

## 2017-06-19 DIAGNOSIS — C25.1 MALIGNANT NEOPLASM OF BODY OF PANCREAS (HCC): ICD-10-CM

## 2017-06-19 DIAGNOSIS — B19.20 HEPATITIS C VIRUS INFECTION WITHOUT HEPATIC COMA, UNSPECIFIED CHRONICITY: ICD-10-CM

## 2017-06-19 DIAGNOSIS — E87.5 HYPERKALEMIA: ICD-10-CM

## 2017-06-19 DIAGNOSIS — I48.92 ATRIAL FLUTTER, UNSPECIFIED TYPE (HCC): ICD-10-CM

## 2017-06-19 DIAGNOSIS — Z51.11 CHEMOTHERAPY MANAGEMENT, ENCOUNTER FOR: Primary | ICD-10-CM

## 2017-06-19 DIAGNOSIS — C25.1 MALIGNANT NEOPLASM OF BODY OF PANCREAS (HCC): Primary | ICD-10-CM

## 2017-06-19 DIAGNOSIS — E87.6 HYPOKALEMIA: ICD-10-CM

## 2017-06-19 PROCEDURE — G0463 HOSPITAL OUTPT CLINIC VISIT: HCPCS | Performed by: INTERNAL MEDICINE

## 2017-06-19 PROCEDURE — 36593 DECLOT VASCULAR DEVICE: CPT

## 2017-06-19 PROCEDURE — 99215 OFFICE O/P EST HI 40 MIN: CPT | Performed by: INTERNAL MEDICINE

## 2017-06-19 PROCEDURE — 80048 BASIC METABOLIC PNL TOTAL CA: CPT

## 2017-06-19 PROCEDURE — 36415 COLL VENOUS BLD VENIPUNCTURE: CPT

## 2017-06-19 RX ORDER — HEPARIN SODIUM (PORCINE) LOCK FLUSH IV SOLN 100 UNIT/ML 100 UNIT/ML
SOLUTION INTRAVENOUS
Status: COMPLETED
Start: 2017-06-19 | End: 2017-06-19

## 2017-06-19 RX ORDER — 0.9 % SODIUM CHLORIDE 0.9 %
VIAL (ML) INJECTION
Status: DISCONTINUED
Start: 2017-06-19 | End: 2017-06-19

## 2017-06-19 RX ORDER — HEPARIN SODIUM (PORCINE) LOCK FLUSH IV SOLN 100 UNIT/ML 100 UNIT/ML
5 SOLUTION INTRAVENOUS ONCE
Status: COMPLETED | OUTPATIENT
Start: 2017-06-19 | End: 2017-06-19

## 2017-06-19 RX ADMIN — HEPARIN SODIUM (PORCINE) LOCK FLUSH IV SOLN 100 UNIT/ML 500 UNITS: 100 SOLUTION INTRAVENOUS at 11:19:00

## 2017-06-19 NOTE — PROGRESS NOTES
[facility] AND [facility]  Hematology/Oncology  Progress Note    Chaitanya Gillis Patient Status:  Outpatient    1945 MRN [id number]   Location [facility] Attending Marilynn Mace MD   Date of Visit 17 PCP Marco Antonio Alex MD     Chief port-a-cath placed on Thursday, complicated by a pneumothorax and is currently recovering with mild dyspnea. She was incidentally screened for hepatitis C and found to be positive.     Interval History:  [place] presents at follow up today to discuss retu clear.   Neck:  No palpable lymphadenopathy. Neck is supple. Lymphatics: There is no palpable lymphadenopathy throughout in the cervical, supraclavicular, axillary, or inguinal regions. Chest: Clear to auscultation. No wheezes or rales.   Heart: Regular r facility-administered medications for this visit.   Facility-Administered Medications Ordered in Other Visits:  Sodium Chloride 0.9 % solution       Sodium Chloride 0.9 % solution             Labs:  No results for input(s): RBC, HGB, HCT, MCV, MCH, MCHC, RD injury. CHEST WALL:  Bilateral breast prostheses are present.  There is a left chest port with tip in the distal left innominate vein.  No axillary or clavicular lymphadenopathy.   BONES:            Scanner multiple changes of the spine.  Due to patient mo within normal limits. The appendix is not identified and there are no inflammatory changes around the cecum. There is    scattered diverticulosis.  No free air or lymphadenopathy in the abdomen or pelvis.  Trace free fluid in the pelvis.   ABDOMINAL WALL:   inflammation given evidence of prior colitis on prior CT.  There is a small quantity of free fluid within the pelvis. 7.  Pleural effusion is discussed on prior imaging from 5/4/17 have resolved.      Assessment and Plan:  Patient Active Problem List: obviously reported to be mets; might be reactive to her low back pain  --As she has no clear evidence of metastatic disease on CT scan, we are recommending the patient proceeds with hopefully surgical resection as she is robust enough to tolerate surgery n face to face with the patient. More than 50% of that time was spent counseling the patient and/or on coordination of care. The diagnosis, prognosis, and general treatment was explained to the patient and the family.       Annette Rodriguez MD  [facility]

## 2017-06-19 NOTE — PROGRESS NOTES
Pt her for BMP drawn and port flush. Port accessed ,no blood return. pt stated her port never worked. As per md order TPA 2mg instilled via port @1005 and capped. bmp drawn from lt ac with  A butterfly needle and tolerated well.  2x2 and coban dressing applie

## 2017-06-30 ENCOUNTER — TELEPHONE (OUTPATIENT)
Dept: HEMATOLOGY/ONCOLOGY | Facility: HOSPITAL | Age: 72
End: 2017-06-30

## 2017-06-30 NOTE — TELEPHONE ENCOUNTER
[place] said she was told to call Denis Partida back once she obtained some information, if danya can please call her when she is available, Thanks

## 2017-06-30 NOTE — TELEPHONE ENCOUNTER
Spoke to [place] who stated she is seeing Dr Anabell Guerra at [facility] on July 6. If determined to be strong enough surgery will be July 19th. I will call [place] back on July 7 to see how appt went. [place] reports she is feeling well with no complaints.

## 2017-07-07 ENCOUNTER — TELEPHONE (OUTPATIENT)
Dept: HEMATOLOGY/ONCOLOGY | Facility: HOSPITAL | Age: 72
End: 2017-07-07

## 2017-07-07 NOTE — TELEPHONE ENCOUNTER
[place] called to update Glenis Alvares and let her know that her surgery date was moved up.  She was scheduled for the 19th but now its the 12th, please call the patient with any questions

## 2017-07-10 NOTE — TELEPHONE ENCOUNTER
Call placed to [place] to confirm we received her message and to make a 4 week post op appt. F/u , post op appt set for Aug 10. Thanked her for the call and wished her well.

## 2017-08-10 ENCOUNTER — OFFICE VISIT (OUTPATIENT)
Dept: HEMATOLOGY/ONCOLOGY | Facility: HOSPITAL | Age: 72
End: 2017-08-10
Attending: INTERNAL MEDICINE
Payer: MEDICARE

## 2017-08-10 ENCOUNTER — NURSE ONLY (OUTPATIENT)
Dept: HEMATOLOGY/ONCOLOGY | Facility: HOSPITAL | Age: 72
End: 2017-08-10
Attending: PHYSICIAN ASSISTANT
Payer: MEDICARE

## 2017-08-10 VITALS
SYSTOLIC BLOOD PRESSURE: 99 MMHG | HEART RATE: 95 BPM | BODY MASS INDEX: 28.27 KG/M2 | RESPIRATION RATE: 16 BRPM | DIASTOLIC BLOOD PRESSURE: 81 MMHG | WEIGHT: 178 LBS | HEIGHT: 66.5 IN | TEMPERATURE: 98 F

## 2017-08-10 DIAGNOSIS — F41.9 ANXIETY: ICD-10-CM

## 2017-08-10 DIAGNOSIS — I48.3 TYPICAL ATRIAL FLUTTER (HCC): ICD-10-CM

## 2017-08-10 DIAGNOSIS — C25.1 MALIGNANT NEOPLASM OF BODY OF PANCREAS (HCC): ICD-10-CM

## 2017-08-10 DIAGNOSIS — C25.1 MALIGNANT NEOPLASM OF BODY OF PANCREAS (HCC): Primary | ICD-10-CM

## 2017-08-10 DIAGNOSIS — Z51.11 CHEMOTHERAPY MANAGEMENT, ENCOUNTER FOR: Primary | ICD-10-CM

## 2017-08-10 DIAGNOSIS — E87.6 HYPOKALEMIA: ICD-10-CM

## 2017-08-10 LAB
ALBUMIN SERPL BCP-MCNC: 4.2 G/DL (ref 3.5–4.8)
ALBUMIN/GLOB SERPL: 1.4 {RATIO} (ref 1–2)
ALP SERPL-CCNC: 65 U/L (ref 32–100)
ALT SERPL-CCNC: 14 U/L (ref 14–54)
ANION GAP SERPL CALC-SCNC: 9 MMOL/L (ref 0–18)
AST SERPL-CCNC: 24 U/L (ref 15–41)
BASOPHILS # BLD: 0.1 K/UL (ref 0–0.2)
BASOPHILS NFR BLD: 1 %
BILIRUB SERPL-MCNC: 0.3 MG/DL (ref 0.3–1.2)
BUN SERPL-MCNC: 12 MG/DL (ref 8–20)
BUN/CREAT SERPL: 22.6 (ref 10–20)
CALCIUM SERPL-MCNC: 9.8 MG/DL (ref 8.5–10.5)
CHLORIDE SERPL-SCNC: 103 MMOL/L (ref 95–110)
CO2 SERPL-SCNC: 24 MMOL/L (ref 22–32)
CREAT SERPL-MCNC: 0.53 MG/DL (ref 0.5–1.5)
EOSINOPHIL # BLD: 0.3 K/UL (ref 0–0.7)
EOSINOPHIL NFR BLD: 3 %
ERYTHROCYTE [DISTWIDTH] IN BLOOD BY AUTOMATED COUNT: 16.2 % (ref 11–15)
GLOBULIN PLAS-MCNC: 3 G/DL (ref 2.5–3.7)
GLUCOSE SERPL-MCNC: 129 MG/DL (ref 70–99)
HCT VFR BLD AUTO: 43.3 % (ref 35–48)
HGB BLD-MCNC: 13.9 G/DL (ref 12–16)
LYMPHOCYTES # BLD: 4.5 K/UL (ref 1–4)
LYMPHOCYTES NFR BLD: 45 %
MCH RBC QN AUTO: 26.5 PG (ref 27–32)
MCHC RBC AUTO-ENTMCNC: 32.1 G/DL (ref 32–37)
MCV RBC AUTO: 82.6 FL (ref 80–100)
MONOCYTES # BLD: 1.1 K/UL (ref 0–1)
MONOCYTES NFR BLD: 12 %
NEUTROPHILS # BLD AUTO: 3.9 K/UL (ref 1.8–7.7)
NEUTROPHILS NFR BLD: 39 %
OSMOLALITY UR CALC.SUM OF ELEC: 283 MOSM/KG (ref 275–295)
PLATELET # BLD AUTO: 377 K/UL (ref 140–400)
PMV BLD AUTO: 9.4 FL (ref 7.4–10.3)
POTASSIUM SERPL-SCNC: 4.5 MMOL/L (ref 3.3–5.1)
PROT SERPL-MCNC: 7.2 G/DL (ref 5.9–8.4)
RBC # BLD AUTO: 5.24 M/UL (ref 3.7–5.4)
SODIUM SERPL-SCNC: 136 MMOL/L (ref 136–144)
WBC # BLD AUTO: 9.9 K/UL (ref 4–11)

## 2017-08-10 PROCEDURE — 96523 IRRIG DRUG DELIVERY DEVICE: CPT

## 2017-08-10 PROCEDURE — 36415 COLL VENOUS BLD VENIPUNCTURE: CPT

## 2017-08-10 PROCEDURE — 85025 COMPLETE CBC W/AUTO DIFF WBC: CPT

## 2017-08-10 PROCEDURE — G0463 HOSPITAL OUTPT CLINIC VISIT: HCPCS | Performed by: INTERNAL MEDICINE

## 2017-08-10 PROCEDURE — 86301 IMMUNOASSAY TUMOR CA 19-9: CPT

## 2017-08-10 PROCEDURE — 80053 COMPREHEN METABOLIC PANEL: CPT

## 2017-08-10 PROCEDURE — 99215 OFFICE O/P EST HI 40 MIN: CPT | Performed by: INTERNAL MEDICINE

## 2017-08-10 RX ORDER — 0.9 % SODIUM CHLORIDE 0.9 %
VIAL (ML) INJECTION
Status: DISCONTINUED
Start: 2017-08-10 | End: 2017-08-10

## 2017-08-10 RX ORDER — 0.9 % SODIUM CHLORIDE 0.9 %
10 VIAL (ML) INJECTION ONCE
Status: CANCELLED | OUTPATIENT
Start: 2017-08-10

## 2017-08-10 RX ORDER — HEPARIN SODIUM (PORCINE) LOCK FLUSH IV SOLN 100 UNIT/ML 100 UNIT/ML
SOLUTION INTRAVENOUS
Status: COMPLETED
Start: 2017-08-10 | End: 2017-08-10

## 2017-08-10 RX ORDER — HEPARIN SODIUM (PORCINE) LOCK FLUSH IV SOLN 100 UNIT/ML 100 UNIT/ML
5 SOLUTION INTRAVENOUS ONCE
Status: CANCELLED | OUTPATIENT
Start: 2017-08-10

## 2017-08-10 RX ORDER — HEPARIN SODIUM (PORCINE) LOCK FLUSH IV SOLN 100 UNIT/ML 100 UNIT/ML
5 SOLUTION INTRAVENOUS ONCE
Status: COMPLETED | OUTPATIENT
Start: 2017-08-10 | End: 2017-08-10

## 2017-08-10 RX ADMIN — HEPARIN SODIUM (PORCINE) LOCK FLUSH IV SOLN 100 UNIT/ML 500 UNITS: 100 SOLUTION INTRAVENOUS at 11:26:00

## 2017-08-10 NOTE — PROGRESS NOTES
Patient arrives after MD visit for lab draw. Patient states she has no had her port flushed in awhile and would like it done today. Patient states her port has never had a blood return, port has been TPA several times with no blood return noted.      Labs c

## 2017-08-11 ENCOUNTER — TELEPHONE (OUTPATIENT)
Dept: HEMATOLOGY/ONCOLOGY | Facility: HOSPITAL | Age: 72
End: 2017-08-11

## 2017-08-11 LAB — CANCER AG19-9 SERPL-ACNC: 41 U/ML (ref 0–35)

## 2017-08-11 NOTE — TELEPHONE ENCOUNTER
Patient advised that prophylactic antibiotics are not needed for dental cleaning from our standpoint or post op GI surgery. Generally reserved for ortho implant prophylaxis. Confirmed CT scan schedule, f/u appt with HW moved to earlier to rvw scan.  Advised

## 2017-08-11 NOTE — TELEPHONE ENCOUNTER
[place] would like a \"call back from Dr. Ricardo Tellez RN. I would like to know if I need to take an antibiotic to get my teeth cleaned. \"

## 2017-08-11 NOTE — PROGRESS NOTES
[facility]  Hematology/Oncology  Progress Note    Nba Whitley Patient Status:  Outpatient    1945 MRN [id number]   Location [address] [facility] Attending Sandra Miguel MD   Date of Visit 08/10/17 PCP Levar Ames MD     Chief port-a-cath placed on Thursday, complicated by a pneumothorax and is currently recovering with mild dyspnea. She was incidentally screened for hepatitis C and found to be positive.     Interval History:  [place] presents at follow up today s/p undergoing (36.4 °C), temperature source Oral, resp. rate 16, height 1.689 m (5' 6.5\"), weight 80.7 kg (178 lb). General: Patient is alert and oriented x 3, not in acute distress.  Ambulating with a cane  Psych: Nervous, anxious but with appropriate questions and CAPSULE EVERY DAY Disp: 90 capsule Rfl: 1   Fluticasone Propionate 50 MCG/ACT Nasal Suspension by Each Nare route daily. Disp:  Rfl:      No current facility-administered medications for this visit. Labs:  Recent Labs   Lab  08/10/17   1109   RBC  5. FOLFIRINOX on 4/24/17 with neulasta given on day 3 developed significant diarrhea with extended hospital course following treatment making additional chemotherapy sessions unavailable  --Patient's tumor marker has decreased from 142.5 prior to cycle 1 of s likely a consequence of her profound electrolyte abnormalities related to her diarrhea complications from aggressive triplet regimen chemotherapy     3.) Anxiety/depressive symptoms    --Recommended patient to behavioral health to help with the anxiety com

## 2017-08-11 NOTE — TELEPHONE ENCOUNTER
Patient scheduled for port flow study for Aug 23 at 10am at [facility]. Directed to yellow parking lot and check in at Registration desk first floor. Patient confirms understanding.

## 2017-08-21 ENCOUNTER — HOSPITAL ENCOUNTER (OUTPATIENT)
Dept: CT IMAGING | Facility: HOSPITAL | Age: 72
Discharge: HOME | End: 2017-08-21
Attending: INTERNAL MEDICINE
Payer: MEDICARE

## 2017-08-21 DIAGNOSIS — C25.1 MALIGNANT NEOPLASM OF BODY OF PANCREAS (HCC): ICD-10-CM

## 2017-08-21 LAB — CREAT BLD-MCNC: 0.7 MG/DL (ref 0.5–1.5)

## 2017-08-21 PROCEDURE — 74177 CT ABD & PELVIS W/CONTRAST: CPT | Performed by: INTERNAL MEDICINE

## 2017-08-21 PROCEDURE — 82565 ASSAY OF CREATININE: CPT

## 2017-08-21 PROCEDURE — 71260 CT THORAX DX C+: CPT | Performed by: INTERNAL MEDICINE

## 2017-08-23 ENCOUNTER — TELEPHONE (OUTPATIENT)
Dept: HEMATOLOGY/ONCOLOGY | Facility: HOSPITAL | Age: 72
End: 2017-08-23

## 2017-08-23 ENCOUNTER — HOSPITAL ENCOUNTER (OUTPATIENT)
Dept: GENERAL RADIOLOGY | Facility: HOSPITAL | Age: 72
Discharge: HOME | End: 2017-08-23
Attending: INTERNAL MEDICINE
Payer: MEDICARE

## 2017-08-23 DIAGNOSIS — C25.9 PANCREATIC CANCER (HCC): ICD-10-CM

## 2017-08-23 DIAGNOSIS — T85.818A: Primary | ICD-10-CM

## 2017-08-23 DIAGNOSIS — T82.594A: Primary | ICD-10-CM

## 2017-08-23 PROCEDURE — 36598 INJ W/FLUOR EVAL CV DEVICE: CPT

## 2017-08-23 RX ORDER — HEPARIN SODIUM (PORCINE) LOCK FLUSH IV SOLN 100 UNIT/ML 100 UNIT/ML
SOLUTION INTRAVENOUS
Status: DISPENSED
Start: 2017-08-23 | End: 2017-08-23

## 2017-08-23 NOTE — IMAGING NOTE
RN CALLED TO ACCESS PORT FOR PORT FLOW STUDY. SITE ACCESSED W/O DIFFICULTY . RN UNABLE TO ASPIRATE BLOOD AFTER ACCESSING SITE. PATIENT STATED THAT THE PORT HAS NOT BEEN FUNCTIONING SINCE IT WAS PLACED MAYBE BACK IN MAY.

## 2017-08-23 NOTE — TELEPHONE ENCOUNTER
Patient aware of obstruction at central venous catheter tip. Explained that Interventional radiology physician contacted and reviewed the flow study and recommends that the port be replaced.  [place] would prefer replacement of the port if that is the lotus

## 2017-08-24 RX ORDER — SODIUM CHLORIDE 9 MG/ML
INJECTION, SOLUTION INTRAVENOUS ONCE
Status: DISCONTINUED | OUTPATIENT
Start: 2017-08-25 | End: 2017-08-25

## 2017-08-25 ENCOUNTER — HOSPITAL ENCOUNTER (OUTPATIENT)
Dept: INTERVENTIONAL RADIOLOGY/VASCULAR | Facility: HOSPITAL | Age: 72
Discharge: HOME | End: 2017-08-25
Attending: INTERNAL MEDICINE | Admitting: INTERNAL MEDICINE
Payer: MEDICARE

## 2017-08-25 VITALS
DIASTOLIC BLOOD PRESSURE: 65 MMHG | OXYGEN SATURATION: 96 % | HEART RATE: 68 BPM | WEIGHT: 178 LBS | RESPIRATION RATE: 15 BRPM | SYSTOLIC BLOOD PRESSURE: 117 MMHG | HEIGHT: 66.5 IN | BODY MASS INDEX: 28.27 KG/M2

## 2017-08-25 DIAGNOSIS — C25.9 PANCREATIC CANCER (HCC): ICD-10-CM

## 2017-08-25 PROCEDURE — 02HV33Z INSERTION OF INFUSION DEVICE INTO SUPERIOR VENA CAVA, PERCUTANEOUS APPROACH: ICD-10-PCS | Performed by: RADIOLOGY

## 2017-08-25 PROCEDURE — 36561 INSERT TUNNELED CV CATH: CPT

## 2017-08-25 PROCEDURE — 77001 FLUOROGUIDE FOR VEIN DEVICE: CPT

## 2017-08-25 PROCEDURE — 0JPV3XZ REMOVAL OF TUNNELED VASCULAR ACCESS DEVICE FROM UPPER EXTREMITY SUBCUTANEOUS TISSUE AND FASCIA, PERCUTANEOUS APPROACH: ICD-10-PCS | Performed by: RADIOLOGY

## 2017-08-25 PROCEDURE — 99152 MOD SED SAME PHYS/QHP 5/>YRS: CPT

## 2017-08-25 PROCEDURE — 02PY33Z REMOVAL OF INFUSION DEVICE FROM GREAT VESSEL, PERCUTANEOUS APPROACH: ICD-10-PCS | Performed by: RADIOLOGY

## 2017-08-25 PROCEDURE — B5181ZA FLUOROSCOPY OF SUPERIOR VENA CAVA USING LOW OSMOLAR CONTRAST, GUIDANCE: ICD-10-PCS | Performed by: RADIOLOGY

## 2017-08-25 PROCEDURE — 99153 MOD SED SAME PHYS/QHP EA: CPT

## 2017-08-25 PROCEDURE — 36590 REMOVAL TUNNELED CV CATH: CPT

## 2017-08-25 RX ORDER — BACITRACIN 50000 [USP'U]/1
INJECTION, POWDER, LYOPHILIZED, FOR SOLUTION INTRAMUSCULAR
Status: COMPLETED
Start: 2017-08-25 | End: 2017-08-25

## 2017-08-25 RX ORDER — HEPARIN SODIUM (PORCINE) LOCK FLUSH IV SOLN 100 UNIT/ML 100 UNIT/ML
SOLUTION INTRAVENOUS
Status: COMPLETED
Start: 2017-08-25 | End: 2017-08-25

## 2017-08-25 RX ORDER — SODIUM CHLORIDE 9 MG/ML
INJECTION, SOLUTION INTRAVENOUS
Status: DISCONTINUED
Start: 2017-08-25 | End: 2017-08-25

## 2017-08-25 RX ORDER — MIDAZOLAM HYDROCHLORIDE 1 MG/ML
INJECTION INTRAMUSCULAR; INTRAVENOUS
Status: COMPLETED
Start: 2017-08-25 | End: 2017-08-25

## 2017-08-25 RX ORDER — LIDOCAINE HYDROCHLORIDE 20 MG/ML
INJECTION, SOLUTION EPIDURAL; INFILTRATION; INTRACAUDAL; PERINEURAL
Status: COMPLETED
Start: 2017-08-25 | End: 2017-08-25

## 2017-08-28 ENCOUNTER — OFFICE VISIT (OUTPATIENT)
Dept: HEMATOLOGY/ONCOLOGY | Facility: HOSPITAL | Age: 72
End: 2017-08-28
Attending: INTERNAL MEDICINE
Payer: MEDICARE

## 2017-08-28 VITALS
RESPIRATION RATE: 16 BRPM | DIASTOLIC BLOOD PRESSURE: 73 MMHG | HEIGHT: 66.5 IN | WEIGHT: 182 LBS | BODY MASS INDEX: 28.9 KG/M2 | HEART RATE: 86 BPM | SYSTOLIC BLOOD PRESSURE: 113 MMHG | TEMPERATURE: 97 F

## 2017-08-28 DIAGNOSIS — B19.20 HEPATITIS C VIRUS INFECTION WITHOUT HEPATIC COMA, UNSPECIFIED CHRONICITY: ICD-10-CM

## 2017-08-28 DIAGNOSIS — C25.1 MALIGNANT NEOPLASM OF BODY OF PANCREAS (HCC): Primary | ICD-10-CM

## 2017-08-28 DIAGNOSIS — E87.6 HYPOKALEMIA: ICD-10-CM

## 2017-08-28 DIAGNOSIS — Z51.11 CHEMOTHERAPY MANAGEMENT, ENCOUNTER FOR: ICD-10-CM

## 2017-08-28 DIAGNOSIS — I48.3 TYPICAL ATRIAL FLUTTER (HCC): ICD-10-CM

## 2017-08-28 DIAGNOSIS — F41.9 ANXIETY: ICD-10-CM

## 2017-08-28 PROCEDURE — 99215 OFFICE O/P EST HI 40 MIN: CPT | Performed by: INTERNAL MEDICINE

## 2017-08-28 PROCEDURE — G0463 HOSPITAL OUTPT CLINIC VISIT: HCPCS | Performed by: INTERNAL MEDICINE

## 2017-08-28 RX ORDER — DOCUSATE SODIUM 100 MG/1
CAPSULE, LIQUID FILLED ORAL
COMMUNITY
Start: 2017-07-18 | End: 2018-01-11 | Stop reason: ALTCHOICE

## 2017-08-28 NOTE — PROGRESS NOTES
[facility] AND [facility]  Hematology/Oncology  Progress Note    Sonjia Schaumann Patient Status:  Outpatient    1945 MRN [id number]   Location [address] [facility] Attending Rossy Hickman MD   Date of Visit 17 PCP Merlyn Doe MD     Chief port-a-cath placed on Thursday, complicated by a pneumothorax and is currently recovering with mild dyspnea. She was incidentally screened for hepatitis C and found to be positive.     Interval History:  [place] presents at follow up today s/p undergoing Exam:    Blood pressure 113/73, pulse 86, temperature (!) 97.3 °F (36.3 °C), temperature source Oral, resp. rate 16, height 1.689 m (5' 6.5\"), weight 82.6 kg (182 lb). General: Patient is alert and oriented x 3, not in acute distress.  Ambulating with a total) by mouth 3 (three) times daily as needed.  Disp: 90 tablet Rfl: 3   SPIRIVA HANDIHALER 18 MCG Inhalation Cap INHALE THE CONTENTS OF 1 CAPSULE EVERY DAY Disp: 90 capsule Rfl: 1   Fluticasone Propionate 50 MCG/ACT Nasal Suspension by Each Nare route da dilatation that may reflect the postcholecystectomy state and age-related ectasia. 7. Areas of apparent colonic wall thickening may be accentuated by underdistention, but are not completely characterized.      8. Distal colonic diverticulosis without CT in 6/2017 showed disease localized to the pancreatic body; no overt signs of distant metastatic disease.  She has a small 0.9 x 0.9 left para-aortic lymph node in the retroperitoneum, new from 5/2017 not obviously reported to be mets; might be reactive to h diarrhea complications from aggressive triplet regimen chemotherapy     3.) Anxiety/depressive symptoms    --Recommended patient to behavioral health to help with the anxiety components related to her illness as she had lots of stress and fear related to p

## 2017-08-28 NOTE — PATIENT INSTRUCTIONS
Follow up Dr Evangelina Pemberton and get prechemo labs at or around Sep 9, 10th    Get your Influenza B vaccine prior to starting chemo    Call Jean Don once you receive the phone call from the Specialty pharmacy to deliver your oral chemotherapy (capecitabine) [phone]

## 2017-08-31 ENCOUNTER — APPOINTMENT (OUTPATIENT)
Dept: HEMATOLOGY/ONCOLOGY | Facility: HOSPITAL | Age: 72
End: 2017-08-31
Attending: INTERNAL MEDICINE
Payer: MEDICARE

## 2017-08-31 DIAGNOSIS — C25.1 MALIGNANT NEOPLASM OF BODY OF PANCREAS (HCC): ICD-10-CM

## 2017-08-31 DIAGNOSIS — C25.1 MALIGNANT NEOPLASM OF BODY OF PANCREAS (HCC): Primary | ICD-10-CM

## 2017-08-31 RX ORDER — CAPECITABINE 500 MG/1
3000 TABLET, FILM COATED ORAL 2 TIMES DAILY
Qty: 252 TABLET | Refills: 5 | Status: SHIPPED | OUTPATIENT
Start: 2017-08-31 | End: 2017-08-31

## 2017-08-31 RX ORDER — CAPECITABINE 150 MG/1
300 TABLET, FILM COATED ORAL 2 TIMES DAILY
Qty: 84 TABLET | Refills: 5 | Status: SHIPPED | OUTPATIENT
Start: 2017-08-31 | End: 2017-08-31 | Stop reason: CLARIF

## 2017-08-31 RX ORDER — CAPECITABINE 500 MG/1
830 TABLET, FILM COATED ORAL 2 TIMES DAILY
Qty: 126 TABLET | Refills: 5 | Status: CANCELLED | OUTPATIENT
Start: 2017-08-31

## 2017-08-31 RX ORDER — CAPECITABINE 500 MG/1
830 TABLET, FILM COATED ORAL 2 TIMES DAILY
Qty: 126 TABLET | Refills: 5 | Status: SHIPPED | OUTPATIENT
Start: 2017-08-31 | End: 2018-01-11

## 2017-08-31 RX ORDER — CAPECITABINE 500 MG/1
1500 TABLET, FILM COATED ORAL 2 TIMES DAILY
Qty: 252 TABLET | Refills: 5 | Status: SHIPPED | OUTPATIENT
Start: 2017-08-31 | End: 2017-08-31

## 2017-08-31 RX ORDER — CAPECITABINE 500 MG/1
1500 TABLET, FILM COATED ORAL 2 TIMES DAILY
Qty: 252 TABLET | Refills: 5 | Status: SHIPPED | OUTPATIENT
Start: 2017-08-31 | End: 2017-08-31 | Stop reason: CLARIF

## 2017-08-31 RX ORDER — CAPECITABINE 150 MG/1
300 TABLET, FILM COATED ORAL 2 TIMES DAILY
Qty: 84 TABLET | Refills: 5 | Status: SHIPPED | OUTPATIENT
Start: 2017-08-31 | End: 2017-08-31

## 2017-09-08 ENCOUNTER — TELEPHONE (OUTPATIENT)
Dept: HEMATOLOGY/ONCOLOGY | Facility: HOSPITAL | Age: 72
End: 2017-09-08

## 2017-09-08 RX ORDER — LORAZEPAM 1 MG/1
TABLET ORAL EVERY 4 HOURS PRN
Status: CANCELLED | OUTPATIENT
Start: 2017-09-21

## 2017-09-08 RX ORDER — LORAZEPAM 1 MG/1
TABLET ORAL EVERY 4 HOURS PRN
Status: CANCELLED | OUTPATIENT
Start: 2017-09-28

## 2017-09-08 RX ORDER — LORAZEPAM 1 MG/1
TABLET ORAL EVERY 4 HOURS PRN
Status: CANCELLED | OUTPATIENT
Start: 2017-09-14

## 2017-09-08 RX ORDER — LORAZEPAM 2 MG/ML
INJECTION INTRAMUSCULAR EVERY 4 HOURS PRN
Status: CANCELLED | OUTPATIENT
Start: 2017-09-28

## 2017-09-08 RX ORDER — METOCLOPRAMIDE HYDROCHLORIDE 5 MG/ML
10 INJECTION INTRAMUSCULAR; INTRAVENOUS EVERY 6 HOURS PRN
Status: CANCELLED | OUTPATIENT
Start: 2017-09-14

## 2017-09-08 RX ORDER — PROCHLORPERAZINE MALEATE 10 MG
10 TABLET ORAL EVERY 6 HOURS PRN
Status: CANCELLED | OUTPATIENT
Start: 2017-09-28

## 2017-09-08 RX ORDER — PROCHLORPERAZINE MALEATE 10 MG
10 TABLET ORAL EVERY 6 HOURS PRN
Status: CANCELLED | OUTPATIENT
Start: 2017-09-21

## 2017-09-08 RX ORDER — LORAZEPAM 2 MG/ML
INJECTION INTRAMUSCULAR EVERY 4 HOURS PRN
Status: CANCELLED | OUTPATIENT
Start: 2017-09-21

## 2017-09-08 RX ORDER — LORAZEPAM 2 MG/ML
INJECTION INTRAMUSCULAR EVERY 4 HOURS PRN
Status: CANCELLED | OUTPATIENT
Start: 2017-09-14

## 2017-09-08 RX ORDER — METOCLOPRAMIDE HYDROCHLORIDE 5 MG/ML
10 INJECTION INTRAMUSCULAR; INTRAVENOUS EVERY 6 HOURS PRN
Status: CANCELLED | OUTPATIENT
Start: 2017-09-28

## 2017-09-08 RX ORDER — METOCLOPRAMIDE HYDROCHLORIDE 5 MG/ML
10 INJECTION INTRAMUSCULAR; INTRAVENOUS EVERY 6 HOURS PRN
Status: CANCELLED | OUTPATIENT
Start: 2017-09-21

## 2017-09-08 RX ORDER — PROCHLORPERAZINE MALEATE 10 MG
10 TABLET ORAL EVERY 6 HOURS PRN
Status: CANCELLED | OUTPATIENT
Start: 2017-09-14

## 2017-09-08 NOTE — TELEPHONE ENCOUNTER
Verified that patient has received her capecitabine. Will bring in for chemo ed on Tuesday 9/12. Infusion aware to start next week, prechemo labs 9/12.

## 2017-09-08 NOTE — TELEPHONE ENCOUNTER
[place] calling and asking when she needs to start her chemo. She has to arrange for rides.  I told her that possibly that it would be discussed on 9/12 her pre chemo visit. Mari Dumont

## 2017-09-12 ENCOUNTER — SOCIAL WORK SERVICES (OUTPATIENT)
Dept: HEMATOLOGY/ONCOLOGY | Facility: HOSPITAL | Age: 72
End: 2017-09-12

## 2017-09-12 ENCOUNTER — OFFICE VISIT (OUTPATIENT)
Dept: HEMATOLOGY/ONCOLOGY | Facility: HOSPITAL | Age: 72
End: 2017-09-12
Attending: INTERNAL MEDICINE
Payer: MEDICARE

## 2017-09-12 VITALS
DIASTOLIC BLOOD PRESSURE: 72 MMHG | BODY MASS INDEX: 28.9 KG/M2 | WEIGHT: 182 LBS | HEIGHT: 66.5 IN | RESPIRATION RATE: 16 BRPM | SYSTOLIC BLOOD PRESSURE: 122 MMHG | TEMPERATURE: 98 F | HEART RATE: 85 BPM

## 2017-09-12 DIAGNOSIS — I48.3 TYPICAL ATRIAL FLUTTER (HCC): ICD-10-CM

## 2017-09-12 DIAGNOSIS — B19.20 HEPATITIS C VIRUS INFECTION WITHOUT HEPATIC COMA, UNSPECIFIED CHRONICITY: ICD-10-CM

## 2017-09-12 DIAGNOSIS — Z51.11 CHEMOTHERAPY MANAGEMENT, ENCOUNTER FOR: Primary | ICD-10-CM

## 2017-09-12 DIAGNOSIS — F41.9 ANXIETY: ICD-10-CM

## 2017-09-12 DIAGNOSIS — C25.1 MALIGNANT NEOPLASM OF BODY OF PANCREAS (HCC): ICD-10-CM

## 2017-09-12 DIAGNOSIS — C25.1 MALIGNANT NEOPLASM OF BODY OF PANCREAS (HCC): Primary | ICD-10-CM

## 2017-09-12 DIAGNOSIS — Z51.11 CHEMOTHERAPY MANAGEMENT, ENCOUNTER FOR: ICD-10-CM

## 2017-09-12 LAB
ALBUMIN SERPL BCP-MCNC: 3.9 G/DL (ref 3.5–4.8)
ALBUMIN/GLOB SERPL: 1.3 {RATIO} (ref 1–2)
ALP SERPL-CCNC: 76 U/L (ref 32–100)
ALT SERPL-CCNC: 16 U/L (ref 14–54)
ANION GAP SERPL CALC-SCNC: 9 MMOL/L (ref 0–18)
AST SERPL-CCNC: 22 U/L (ref 15–41)
BASOPHILS # BLD: 0 K/UL (ref 0–0.2)
BASOPHILS NFR BLD: 0 %
BILIRUB SERPL-MCNC: 0.4 MG/DL (ref 0.3–1.2)
BUN SERPL-MCNC: 12 MG/DL (ref 8–20)
BUN/CREAT SERPL: 15 (ref 10–20)
CALCIUM SERPL-MCNC: 9.7 MG/DL (ref 8.5–10.5)
CHLORIDE SERPL-SCNC: 103 MMOL/L (ref 95–110)
CO2 SERPL-SCNC: 26 MMOL/L (ref 22–32)
CREAT SERPL-MCNC: 0.8 MG/DL (ref 0.5–1.5)
EOSINOPHIL # BLD: 0.2 K/UL (ref 0–0.7)
EOSINOPHIL NFR BLD: 3 %
ERYTHROCYTE [DISTWIDTH] IN BLOOD BY AUTOMATED COUNT: 16.1 % (ref 11–15)
GLOBULIN PLAS-MCNC: 3.1 G/DL (ref 2.5–3.7)
GLUCOSE SERPL-MCNC: 139 MG/DL (ref 70–99)
HCT VFR BLD AUTO: 41.3 % (ref 35–48)
HGB BLD-MCNC: 13.1 G/DL (ref 12–16)
LYMPHOCYTES # BLD: 4.2 K/UL (ref 1–4)
LYMPHOCYTES NFR BLD: 44 %
MCH RBC QN AUTO: 25.8 PG (ref 27–32)
MCHC RBC AUTO-ENTMCNC: 31.8 G/DL (ref 32–37)
MCV RBC AUTO: 81 FL (ref 80–100)
MONOCYTES # BLD: 1.1 K/UL (ref 0–1)
MONOCYTES NFR BLD: 11 %
NEUTROPHILS # BLD AUTO: 4 K/UL (ref 1.8–7.7)
NEUTROPHILS NFR BLD: 42 %
OSMOLALITY UR CALC.SUM OF ELEC: 288 MOSM/KG (ref 275–295)
PLATELET # BLD AUTO: 325 K/UL (ref 140–400)
PMV BLD AUTO: 10.2 FL (ref 7.4–10.3)
POTASSIUM SERPL-SCNC: 4 MMOL/L (ref 3.3–5.1)
PROT SERPL-MCNC: 7 G/DL (ref 5.9–8.4)
RBC # BLD AUTO: 5.1 M/UL (ref 3.7–5.4)
SODIUM SERPL-SCNC: 138 MMOL/L (ref 136–144)
WBC # BLD AUTO: 9.6 K/UL (ref 4–11)

## 2017-09-12 PROCEDURE — 36591 DRAW BLOOD OFF VENOUS DEVICE: CPT

## 2017-09-12 PROCEDURE — 99215 OFFICE O/P EST HI 40 MIN: CPT | Performed by: INTERNAL MEDICINE

## 2017-09-12 PROCEDURE — 85025 COMPLETE CBC W/AUTO DIFF WBC: CPT

## 2017-09-12 PROCEDURE — 80053 COMPREHEN METABOLIC PANEL: CPT

## 2017-09-12 PROCEDURE — G0463 HOSPITAL OUTPT CLINIC VISIT: HCPCS | Performed by: INTERNAL MEDICINE

## 2017-09-12 RX ORDER — 0.9 % SODIUM CHLORIDE 0.9 %
VIAL (ML) INJECTION
Status: DISCONTINUED
Start: 2017-09-12 | End: 2017-09-12

## 2017-09-12 RX ORDER — HEPARIN SODIUM (PORCINE) LOCK FLUSH IV SOLN 100 UNIT/ML 100 UNIT/ML
5 SOLUTION INTRAVENOUS ONCE
Status: COMPLETED | OUTPATIENT
Start: 2017-09-12 | End: 2017-09-12

## 2017-09-12 RX ORDER — HEPARIN SODIUM (PORCINE) LOCK FLUSH IV SOLN 100 UNIT/ML 100 UNIT/ML
SOLUTION INTRAVENOUS
Status: COMPLETED
Start: 2017-09-12 | End: 2017-09-12

## 2017-09-12 RX ORDER — HEPARIN SODIUM (PORCINE) LOCK FLUSH IV SOLN 100 UNIT/ML 100 UNIT/ML
5 SOLUTION INTRAVENOUS ONCE
Status: CANCELLED | OUTPATIENT
Start: 2017-09-12

## 2017-09-12 RX ORDER — 0.9 % SODIUM CHLORIDE 0.9 %
10 VIAL (ML) INJECTION ONCE
Status: CANCELLED | OUTPATIENT
Start: 2017-09-12

## 2017-09-12 RX ADMIN — HEPARIN SODIUM (PORCINE) LOCK FLUSH IV SOLN 100 UNIT/ML 500 UNITS: 100 SOLUTION INTRAVENOUS at 14:20:00

## 2017-09-12 NOTE — PROGRESS NOTES
2017  [address] [place] [address]  :  1945  [address] [address] [place] [address]      Met with [place] alone and presented the patient with the approximate cost of chemotherapy drugs ordered by the oncologist.  The patient was provided with a

## 2017-09-12 NOTE — PROGRESS NOTES
Patient here for FT lab followed by MD visit. Patient states she had Port in Left chest was removed because there was no blood return. New Port placed on 8/29/17. Right chest Port accessed for Lab draw. Good blood return noted.   Cbc,cmp obtained and se

## 2017-09-12 NOTE — PATIENT INSTRUCTIONS
Medication Education Record:  IV/Oral Cancer    Learner:  Patient    Barriers / Limitations:  Emotional factors    Diagnosis:   PANCREATIC CANCER    IV Cancer Treatment Name(s): CAPECITABINE/GEMZAR  IV Cancer Treatment Frequency CYCLES ARE EVERY 28 DAYS: Drug / Drug or Drug / Food Interactions:  NO INTERACTIONS WITH CURRENT MEDICATIONS    Cancer Treatment Side Effects (refer to Dallin Bella 2128 for further information):  Allergic reactions  Diarrhea  Fatigue  Hair loss  Heart effects  Kidney / [place] See physically active as you can, but start out slowly, and increase your activity over time as you feel stronger. Listen to your body and rest when you need to. Do what you can when you feel up to it. Oral Care  o Keep your mouth clean.   Rinse your mikal intake  • Difficulty breathing, chest pain, or new cough  • Excessive tiredness or weakness, confusion or loss of balance  • New rash  • Tingling or burning, redness, swelling of the palms of hands or soles of feet  • Sudden new unexpected symptom –such as Chemotherapy can have harmful side effects to the fetus, especially in the first trimester.   In addition, menstrual cycles can become irregular during and after treatment, so you may not know if you are at a time in your cycle when you could become pregnan in the trash (preventing contamination of landfills and water supply). Please contact your local police department for collection of unused prescription medications.

## 2017-09-12 NOTE — PROGRESS NOTES
[facility]  Hematology/Oncology  Progress Note    Nba Whitley Patient Status:  Outpatient    1945 MRN [id number]   Location [address] [facility] Attending Sandra Miguel MD   Date of Visit 17 PCP Levar Ames MD     Chief port-a-cath placed on Thursday, complicated by a pneumothorax and is currently recovering with mild dyspnea. She was incidentally screened for hepatitis C and found to be positive.  [place] underwent surgical pancreatic resection on 7/217 at [facility] by FRENCH °C), temperature source Oral, resp. rate 16, height 1.689 m (5' 6.5\"), weight 82.6 kg (182 lb). General: Patient is alert and oriented x 3, not in acute distress.  Ambulating with a cane  Psych: Nervous, anxious but with appropriate questions and respon as needed. Disp: 90 tablet Rfl: 3   SPIRIVA HANDIHALER 18 MCG Inhalation Cap INHALE THE CONTENTS OF 1 CAPSULE EVERY DAY Disp: 90 capsule Rfl: 1   Fluticasone Propionate 50 MCG/ACT Nasal Suspension by Each Nare route daily.  Disp:  Rfl:            Labs:  Rec pancreaticojejunosotomy by Dr. Asha Mccoy at [facility] on 7/12/17.      Plan:    1.) Pancreatic Cancer- resectable disease; s/p surgical resection on 7/12/17; pyT2N2    --Patient treated with 1 cycle of neoadjuvant FOLFIRINOX on 4/24/17 with neulasta given in treating pancreatic cancer can lead to potential tumor growth. --Pt will begin cycle 1 of capecitabine+gemcitabine on therapy on Thursday, 9 weeks from her operative date.    --Pt underwent repeat chemo ed visit today      2.) Atrial Flutter    --Pt's

## 2017-09-14 ENCOUNTER — OFFICE VISIT (OUTPATIENT)
Dept: HEMATOLOGY/ONCOLOGY | Facility: HOSPITAL | Age: 72
End: 2017-09-14
Attending: INTERNAL MEDICINE
Payer: MEDICARE

## 2017-09-14 VITALS
TEMPERATURE: 98 F | RESPIRATION RATE: 16 BRPM | HEART RATE: 85 BPM | DIASTOLIC BLOOD PRESSURE: 64 MMHG | SYSTOLIC BLOOD PRESSURE: 137 MMHG

## 2017-09-14 DIAGNOSIS — Z51.11 CHEMOTHERAPY MANAGEMENT, ENCOUNTER FOR: ICD-10-CM

## 2017-09-14 DIAGNOSIS — C25.1 MALIGNANT NEOPLASM OF BODY OF PANCREAS (HCC): Primary | ICD-10-CM

## 2017-09-14 PROCEDURE — 96367 TX/PROPH/DG ADDL SEQ IV INF: CPT

## 2017-09-14 PROCEDURE — 96413 CHEMO IV INFUSION 1 HR: CPT

## 2017-09-14 RX ORDER — HEPARIN SODIUM (PORCINE) LOCK FLUSH IV SOLN 100 UNIT/ML 100 UNIT/ML
5 SOLUTION INTRAVENOUS ONCE
Status: CANCELLED | OUTPATIENT
Start: 2017-09-14

## 2017-09-14 RX ORDER — 0.9 % SODIUM CHLORIDE 0.9 %
10 VIAL (ML) INJECTION ONCE
Status: CANCELLED | OUTPATIENT
Start: 2017-09-14

## 2017-09-14 RX ORDER — HEPARIN SODIUM (PORCINE) LOCK FLUSH IV SOLN 100 UNIT/ML 100 UNIT/ML
SOLUTION INTRAVENOUS
Status: COMPLETED
Start: 2017-09-14 | End: 2017-09-14

## 2017-09-14 RX ORDER — HEPARIN SODIUM (PORCINE) LOCK FLUSH IV SOLN 100 UNIT/ML 100 UNIT/ML
5 SOLUTION INTRAVENOUS ONCE
Status: COMPLETED | OUTPATIENT
Start: 2017-09-14 | End: 2017-09-14

## 2017-09-14 RX ORDER — 0.9 % SODIUM CHLORIDE 0.9 %
VIAL (ML) INJECTION
Status: DISCONTINUED
Start: 2017-09-14 | End: 2017-09-14

## 2017-09-14 RX ORDER — SODIUM CHLORIDE 9 MG/ML
INJECTION, SOLUTION INTRAVENOUS
Status: DISCONTINUED
Start: 2017-09-14 | End: 2017-09-14

## 2017-09-14 RX ADMIN — HEPARIN SODIUM (PORCINE) LOCK FLUSH IV SOLN 100 UNIT/ML 500 UNITS: 100 SOLUTION INTRAVENOUS at 13:30:00

## 2017-09-14 NOTE — PROGRESS NOTES
Post Chemo Documentation    Patient is here for treatment today, Cycle1, Day1 Gemzar.       Arrives Ambulating independently, gait steady      Accompanied by Family member, sister                                 Modifications in dose or schedule: No    Verb visit?  Yes  Discharge instructions:  Call if you have questions    Safety/handling:  raymond BENNETT  Reviewed symptoms to report to doctor and phone number to call [phone]  Discharged to Home     Discharged Ambulating independently    Acco

## 2017-09-15 ENCOUNTER — TELEPHONE (OUTPATIENT)
Dept: HEMATOLOGY/ONCOLOGY | Facility: HOSPITAL | Age: 72
End: 2017-09-15

## 2017-09-15 NOTE — TELEPHONE ENCOUNTER
Call for C #1 Day 2 tox check.  Reinforced use of compazine to control nausea, immodium to control and treat diarrhea, protection of skin from sun using visor and sunblock, hand and foot care with creams twice daily, hydration - push fluids, and call with a

## 2017-09-17 PROBLEM — B18.2 CHRONIC HEPATITIS C WITHOUT HEPATIC COMA (HCC): Status: ACTIVE | Noted: 2017-09-17

## 2017-09-17 PROBLEM — Z51.11 ENCOUNTER FOR ANTINEOPLASTIC CHEMOTHERAPY: Status: RESOLVED | Noted: 2017-04-11 | Resolved: 2017-09-17

## 2017-09-17 PROBLEM — D64.9 ANEMIA: Status: RESOLVED | Noted: 2017-04-28 | Resolved: 2017-09-17

## 2017-09-17 PROBLEM — K86.89 PANCREATIC MASS: Status: RESOLVED | Noted: 2017-03-06 | Resolved: 2017-09-17

## 2017-09-17 PROBLEM — R19.7 DIARRHEA: Status: RESOLVED | Noted: 2017-05-02 | Resolved: 2017-09-17

## 2017-09-21 ENCOUNTER — TELEPHONE (OUTPATIENT)
Dept: HEMATOLOGY/ONCOLOGY | Facility: HOSPITAL | Age: 72
End: 2017-09-21

## 2017-09-21 ENCOUNTER — NURSE ONLY (OUTPATIENT)
Dept: HEMATOLOGY/ONCOLOGY | Facility: HOSPITAL | Age: 72
End: 2017-09-21
Attending: INTERNAL MEDICINE
Payer: MEDICARE

## 2017-09-21 VITALS
WEIGHT: 183 LBS | RESPIRATION RATE: 16 BRPM | HEART RATE: 70 BPM | BODY MASS INDEX: 29 KG/M2 | DIASTOLIC BLOOD PRESSURE: 46 MMHG | SYSTOLIC BLOOD PRESSURE: 108 MMHG | TEMPERATURE: 98 F

## 2017-09-21 DIAGNOSIS — R53.83 FATIGUE, UNSPECIFIED TYPE: ICD-10-CM

## 2017-09-21 DIAGNOSIS — E83.42 HYPOMAGNESEMIA: ICD-10-CM

## 2017-09-21 DIAGNOSIS — C25.1 MALIGNANT NEOPLASM OF BODY OF PANCREAS (HCC): Primary | ICD-10-CM

## 2017-09-21 DIAGNOSIS — I10 ESSENTIAL HYPERTENSION: ICD-10-CM

## 2017-09-21 LAB
BASOPHILS # BLD: 0.1 K/UL (ref 0–0.2)
BASOPHILS NFR BLD: 1 %
EOSINOPHIL # BLD: 0 K/UL (ref 0–0.7)
EOSINOPHIL NFR BLD: 1 %
ERYTHROCYTE [DISTWIDTH] IN BLOOD BY AUTOMATED COUNT: 16 % (ref 11–15)
HCT VFR BLD AUTO: 38.8 % (ref 35–48)
HGB BLD-MCNC: 12.7 G/DL (ref 12–16)
LYMPHOCYTES # BLD: 3.6 K/UL (ref 1–4)
LYMPHOCYTES NFR BLD: 73 %
MAGNESIUM SERPL-MCNC: 1.9 MG/DL (ref 1.8–2.5)
MCH RBC QN AUTO: 26.2 PG (ref 27–32)
MCHC RBC AUTO-ENTMCNC: 32.8 G/DL (ref 32–37)
MCV RBC AUTO: 80 FL (ref 80–100)
MONOCYTES # BLD: 0.4 K/UL (ref 0–1)
MONOCYTES NFR BLD: 8 %
NEUTROPHILS # BLD AUTO: 0.9 K/UL (ref 1.8–7.7)
NEUTROPHILS NFR BLD: 18 %
PLATELET # BLD AUTO: 243 K/UL (ref 140–400)
PMV BLD AUTO: 9.4 FL (ref 7.4–10.3)
RBC # BLD AUTO: 4.85 M/UL (ref 3.7–5.4)
TSH SERPL-ACNC: 2.95 UIU/ML (ref 0.45–5.33)
VIT B12 SERPL-MCNC: 296 PG/ML (ref 181–914)
WBC # BLD AUTO: 5 K/UL (ref 4–11)

## 2017-09-21 PROCEDURE — 84443 ASSAY THYROID STIM HORMONE: CPT

## 2017-09-21 PROCEDURE — 82607 VITAMIN B-12: CPT

## 2017-09-21 PROCEDURE — 96413 CHEMO IV INFUSION 1 HR: CPT

## 2017-09-21 PROCEDURE — 83735 ASSAY OF MAGNESIUM: CPT

## 2017-09-21 PROCEDURE — 85025 COMPLETE CBC W/AUTO DIFF WBC: CPT

## 2017-09-21 PROCEDURE — 96375 TX/PRO/DX INJ NEW DRUG ADDON: CPT

## 2017-09-21 RX ORDER — 0.9 % SODIUM CHLORIDE 0.9 %
VIAL (ML) INJECTION
Status: DISCONTINUED
Start: 2017-09-21 | End: 2017-09-21

## 2017-09-21 RX ORDER — SODIUM CHLORIDE 9 MG/ML
INJECTION, SOLUTION INTRAVENOUS
Status: DISCONTINUED
Start: 2017-09-21 | End: 2017-09-21

## 2017-09-21 RX ORDER — HEPARIN SODIUM (PORCINE) LOCK FLUSH IV SOLN 100 UNIT/ML 100 UNIT/ML
SOLUTION INTRAVENOUS
Status: DISCONTINUED
Start: 2017-09-21 | End: 2017-09-21

## 2017-09-21 NOTE — PROGRESS NOTES
Pt arrived for prechemo labs. Pt states she is feeling well and offers no complaints. Port accessed per protocol, positive blood return noted. Pt requested labs to be drawn that primary care doctor ordered.  Mg, TSH, Vb12 along with scheduled CBC drawn and

## 2017-09-21 NOTE — TELEPHONE ENCOUNTER
ANC 0.9. Day 8 Capecitabine/Gemzar. Dr Lomeli Cleverly notified for OK or defer treatment. Infusion and patient advised.

## 2017-09-21 NOTE — PROGRESS NOTES
Patient to infusion for Cycle 1 Day 8  Gemzar  ANC 0.9 .patient is afebrile she is feeling well today offers no complaints.   Will proceed with chemo today with 25% DR   She tolerated 1 st treatment well, felt drowsy for 1 day post infusion, but resolved wi neutropenic precautions, apply lotion to hands and feet, good oral care, keeping lips moist      Teaching materials provided on the following topics n     Barriers to education; None           Cancer treatment education, including treatment plan, supportiv

## 2017-09-21 NOTE — TELEPHONE ENCOUNTER
Reviewed compliance with capecitabine over phone with infusion RN , Vladimir Connelly who is caring for patient. Today is Day 8 of Cycle 1 cape/gemzar. ANC 0.9, Dr Racquel Vázquez notified.  Received orders to administer Gemzar at 75% of dose and continue capecitabine at 150

## 2017-09-28 ENCOUNTER — NURSE ONLY (OUTPATIENT)
Dept: HEMATOLOGY/ONCOLOGY | Facility: HOSPITAL | Age: 72
End: 2017-09-28
Attending: INTERNAL MEDICINE
Payer: MEDICARE

## 2017-09-28 ENCOUNTER — TELEPHONE (OUTPATIENT)
Dept: HEMATOLOGY/ONCOLOGY | Facility: HOSPITAL | Age: 72
End: 2017-09-28

## 2017-09-28 VITALS
RESPIRATION RATE: 16 BRPM | HEART RATE: 66 BPM | DIASTOLIC BLOOD PRESSURE: 69 MMHG | SYSTOLIC BLOOD PRESSURE: 131 MMHG | TEMPERATURE: 98 F

## 2017-09-28 DIAGNOSIS — C25.1 MALIGNANT NEOPLASM OF BODY OF PANCREAS (HCC): Primary | ICD-10-CM

## 2017-09-28 DIAGNOSIS — Z51.11 CHEMOTHERAPY MANAGEMENT, ENCOUNTER FOR: ICD-10-CM

## 2017-09-28 LAB
BASOPHILS # BLD: 0 K/UL (ref 0–0.2)
BASOPHILS NFR BLD: 0 %
EOSINOPHIL # BLD: 0 K/UL (ref 0–0.7)
EOSINOPHIL NFR BLD: 0 %
ERYTHROCYTE [DISTWIDTH] IN BLOOD BY AUTOMATED COUNT: 15.6 % (ref 11–15)
HCT VFR BLD AUTO: 37.1 % (ref 35–48)
HGB BLD-MCNC: 11.9 G/DL (ref 12–16)
LYMPHOCYTES # BLD: 3.2 K/UL (ref 1–4)
LYMPHOCYTES NFR BLD: 80 %
MCH RBC QN AUTO: 25.6 PG (ref 27–32)
MCHC RBC AUTO-ENTMCNC: 32 G/DL (ref 32–37)
MCV RBC AUTO: 79.9 FL (ref 80–100)
MONOCYTES # BLD: 0.2 K/UL (ref 0–1)
MONOCYTES NFR BLD: 4 %
NEUTROPHILS # BLD AUTO: 0.6 K/UL (ref 1.8–7.7)
NEUTROPHILS NFR BLD: 15 %
PLATELET # BLD AUTO: 105 K/UL (ref 140–400)
PMV BLD AUTO: 9.4 FL (ref 7.4–10.3)
RBC # BLD AUTO: 4.64 M/UL (ref 3.7–5.4)
WBC # BLD AUTO: 4 K/UL (ref 4–11)

## 2017-09-28 PROCEDURE — 85025 COMPLETE CBC W/AUTO DIFF WBC: CPT

## 2017-09-28 PROCEDURE — 96413 CHEMO IV INFUSION 1 HR: CPT

## 2017-09-28 PROCEDURE — 96375 TX/PRO/DX INJ NEW DRUG ADDON: CPT

## 2017-09-28 RX ORDER — 0.9 % SODIUM CHLORIDE 0.9 %
VIAL (ML) INJECTION
Status: DISCONTINUED
Start: 2017-09-28 | End: 2017-09-28

## 2017-09-28 RX ORDER — 0.9 % SODIUM CHLORIDE 0.9 %
10 VIAL (ML) INJECTION ONCE
Status: CANCELLED | OUTPATIENT
Start: 2017-09-28

## 2017-09-28 RX ORDER — HEPARIN SODIUM (PORCINE) LOCK FLUSH IV SOLN 100 UNIT/ML 100 UNIT/ML
5 SOLUTION INTRAVENOUS ONCE
Status: COMPLETED | OUTPATIENT
Start: 2017-09-28 | End: 2017-09-28

## 2017-09-28 RX ORDER — HEPARIN SODIUM (PORCINE) LOCK FLUSH IV SOLN 100 UNIT/ML 100 UNIT/ML
5 SOLUTION INTRAVENOUS ONCE
Status: CANCELLED | OUTPATIENT
Start: 2017-09-28

## 2017-09-28 RX ORDER — SODIUM CHLORIDE 9 MG/ML
INJECTION, SOLUTION INTRAVENOUS
Status: DISCONTINUED
Start: 2017-09-28 | End: 2017-09-28

## 2017-09-28 RX ORDER — HEPARIN SODIUM (PORCINE) LOCK FLUSH IV SOLN 100 UNIT/ML 100 UNIT/ML
SOLUTION INTRAVENOUS
Status: COMPLETED
Start: 2017-09-28 | End: 2017-09-28

## 2017-09-28 RX ADMIN — HEPARIN SODIUM (PORCINE) LOCK FLUSH IV SOLN 100 UNIT/ML 500 UNITS: 100 SOLUTION INTRAVENOUS at 14:42:00

## 2017-09-28 NOTE — TELEPHONE ENCOUNTER
Call from reference lab with critical results. ANC 0.6. For day 15 of chemo today. Dr Racquel Vázquez notified. Orders received. Infusion notified.

## 2017-09-28 NOTE — PROGRESS NOTES
Pt arrived for prechemo labs. Ambulated from waiting room with steady gait. Pt states she is feeling well and offers no complaints. Port accessed using sterile technique, good blood return noted. CBC drawn and sent to lab.    Port flushed with 20 ml of sa

## 2017-09-28 NOTE — PROGRESS NOTES
Risk of Infection    Risk of infection related to:    Neutropenia - monitoring    Goal:    Free from infection - making progress    Interventions:    follow infection risk protocol - assess integumentary status, educate on hand hygiene, include patie

## 2017-09-29 ENCOUNTER — TELEPHONE (OUTPATIENT)
Dept: HEMATOLOGY/ONCOLOGY | Facility: HOSPITAL | Age: 72
End: 2017-09-29

## 2017-09-29 NOTE — TELEPHONE ENCOUNTER
Prompted by converstation I had with pt yesterday, she stated she received a call from a specialty pharmacy citing a very high copay. She was confused as her first delivery was a 0 copay amount of the capecitabine.   I determined her drug should be coming f

## 2017-10-11 ENCOUNTER — OFFICE VISIT (OUTPATIENT)
Dept: HEMATOLOGY/ONCOLOGY | Facility: HOSPITAL | Age: 72
End: 2017-10-11
Attending: INTERNAL MEDICINE
Payer: MEDICARE

## 2017-10-11 VITALS
DIASTOLIC BLOOD PRESSURE: 66 MMHG | WEIGHT: 185 LBS | HEART RATE: 87 BPM | HEIGHT: 66.5 IN | SYSTOLIC BLOOD PRESSURE: 110 MMHG | BODY MASS INDEX: 29.38 KG/M2 | RESPIRATION RATE: 16 BRPM | TEMPERATURE: 98 F

## 2017-10-11 DIAGNOSIS — Z51.11 CHEMOTHERAPY MANAGEMENT, ENCOUNTER FOR: ICD-10-CM

## 2017-10-11 DIAGNOSIS — C25.1 MALIGNANT NEOPLASM OF BODY OF PANCREAS (HCC): Primary | ICD-10-CM

## 2017-10-11 DIAGNOSIS — I48.92 ATRIAL FLUTTER, UNSPECIFIED TYPE (HCC): ICD-10-CM

## 2017-10-11 DIAGNOSIS — F41.9 ANXIETY: ICD-10-CM

## 2017-10-11 DIAGNOSIS — B19.20 HEPATITIS C VIRUS INFECTION WITHOUT HEPATIC COMA, UNSPECIFIED CHRONICITY: ICD-10-CM

## 2017-10-11 PROCEDURE — 80053 COMPREHEN METABOLIC PANEL: CPT

## 2017-10-11 PROCEDURE — 99215 OFFICE O/P EST HI 40 MIN: CPT | Performed by: INTERNAL MEDICINE

## 2017-10-11 PROCEDURE — 36591 DRAW BLOOD OFF VENOUS DEVICE: CPT

## 2017-10-11 PROCEDURE — G0463 HOSPITAL OUTPT CLINIC VISIT: HCPCS | Performed by: INTERNAL MEDICINE

## 2017-10-11 PROCEDURE — 85025 COMPLETE CBC W/AUTO DIFF WBC: CPT

## 2017-10-11 RX ORDER — LORAZEPAM 1 MG/1
TABLET ORAL EVERY 4 HOURS PRN
Status: CANCELLED | OUTPATIENT
Start: 2017-10-12

## 2017-10-11 RX ORDER — METOCLOPRAMIDE HYDROCHLORIDE 5 MG/ML
10 INJECTION INTRAMUSCULAR; INTRAVENOUS EVERY 6 HOURS PRN
Status: CANCELLED | OUTPATIENT
Start: 2017-10-19

## 2017-10-11 RX ORDER — HEPARIN SODIUM (PORCINE) LOCK FLUSH IV SOLN 100 UNIT/ML 100 UNIT/ML
5 SOLUTION INTRAVENOUS ONCE
Status: CANCELLED | OUTPATIENT
Start: 2017-10-11

## 2017-10-11 RX ORDER — LORAZEPAM 2 MG/ML
INJECTION INTRAMUSCULAR EVERY 4 HOURS PRN
Status: CANCELLED | OUTPATIENT
Start: 2017-10-12

## 2017-10-11 RX ORDER — PROCHLORPERAZINE MALEATE 10 MG
10 TABLET ORAL EVERY 6 HOURS PRN
Status: CANCELLED | OUTPATIENT
Start: 2017-10-26

## 2017-10-11 RX ORDER — LORAZEPAM 1 MG/1
TABLET ORAL EVERY 4 HOURS PRN
Status: CANCELLED | OUTPATIENT
Start: 2017-10-19

## 2017-10-11 RX ORDER — LORAZEPAM 2 MG/ML
INJECTION INTRAMUSCULAR EVERY 4 HOURS PRN
Status: CANCELLED | OUTPATIENT
Start: 2017-10-26

## 2017-10-11 RX ORDER — 0.9 % SODIUM CHLORIDE 0.9 %
VIAL (ML) INJECTION
Status: DISCONTINUED
Start: 2017-10-11 | End: 2017-10-11

## 2017-10-11 RX ORDER — 0.9 % SODIUM CHLORIDE 0.9 %
10 VIAL (ML) INJECTION ONCE
Status: CANCELLED | OUTPATIENT
Start: 2017-10-11

## 2017-10-11 RX ORDER — PROCHLORPERAZINE MALEATE 10 MG
10 TABLET ORAL EVERY 6 HOURS PRN
Status: CANCELLED | OUTPATIENT
Start: 2017-10-12

## 2017-10-11 RX ORDER — HEPARIN SODIUM (PORCINE) LOCK FLUSH IV SOLN 100 UNIT/ML 100 UNIT/ML
5 SOLUTION INTRAVENOUS ONCE
Status: COMPLETED | OUTPATIENT
Start: 2017-10-11 | End: 2017-10-11

## 2017-10-11 RX ORDER — METOCLOPRAMIDE HYDROCHLORIDE 5 MG/ML
10 INJECTION INTRAMUSCULAR; INTRAVENOUS EVERY 6 HOURS PRN
Status: CANCELLED | OUTPATIENT
Start: 2017-10-26

## 2017-10-11 RX ORDER — METOCLOPRAMIDE HYDROCHLORIDE 5 MG/ML
10 INJECTION INTRAMUSCULAR; INTRAVENOUS EVERY 6 HOURS PRN
Status: CANCELLED | OUTPATIENT
Start: 2017-10-12

## 2017-10-11 RX ORDER — LORAZEPAM 2 MG/ML
INJECTION INTRAMUSCULAR EVERY 4 HOURS PRN
Status: CANCELLED | OUTPATIENT
Start: 2017-10-19

## 2017-10-11 RX ORDER — HEPARIN SODIUM (PORCINE) LOCK FLUSH IV SOLN 100 UNIT/ML 100 UNIT/ML
SOLUTION INTRAVENOUS
Status: DISCONTINUED
Start: 2017-10-11 | End: 2017-10-11

## 2017-10-11 RX ORDER — LIDOCAINE AND PRILOCAINE 25; 25 MG/G; MG/G
CREAM TOPICAL
Qty: 1 TUBE | Refills: 1 | Status: SHIPPED | OUTPATIENT
Start: 2017-10-11

## 2017-10-11 RX ORDER — PROCHLORPERAZINE MALEATE 10 MG
10 TABLET ORAL EVERY 6 HOURS PRN
Status: CANCELLED | OUTPATIENT
Start: 2017-10-19

## 2017-10-11 RX ORDER — LORAZEPAM 1 MG/1
TABLET ORAL EVERY 4 HOURS PRN
Status: CANCELLED | OUTPATIENT
Start: 2017-10-26

## 2017-10-11 RX ORDER — MELATONIN
1000 DAILY
COMMUNITY

## 2017-10-11 RX ADMIN — HEPARIN SODIUM (PORCINE) LOCK FLUSH IV SOLN 100 UNIT/ML 500 UNITS: 100 SOLUTION INTRAVENOUS at 11:45:00

## 2017-10-11 NOTE — PROGRESS NOTES
Pt here for pre-chemo labs then oncologist visit; she'll have treatment tomorrow. She prefers same day.   States after cycle 1, she's had constipation (takes miralax and stool softener daily, has relief with prunes eaten additionally) then diarrhea (brief)

## 2017-10-11 NOTE — PATIENT INSTRUCTIONS
Please, try to use over the counter Metamucil (Psyllium) for alternating constipation and diarrhea symptoms

## 2017-10-11 NOTE — PROGRESS NOTES
[facility] AND [facility]  Hematology/Oncology  Progress Note    Lucina Ramos Patient Status:  Outpatient    1945 MRN [id number]   Location [address] [facility] Attending Bruce Moreno MD   Date of Visit 10/11/17 PCP Melanie Goldberg MD     Chief port-a-cath placed on Thursday, complicated by a pneumothorax and is currently recovering with mild dyspnea. She was incidentally screened for hepatitis C and found to be positive.  [place] underwent surgical pancreatic resection on 7/217 at [facility] by FRENCH pulse 87, temperature 97.9 °F (36.6 °C), temperature source Oral, resp. rate 16, height 1.689 m (5' 6.5\"), weight 83.9 kg (185 lb). General: Patient is alert and oriented x 3, not in acute distress.  Ambulating with a cane  Psych: Nervous, anxious but w by mouth every other day. Disp: 15 tablet Rfl: 11   Loperamide HCl 2 MG Oral Cap Take 1 capsule (2 mg total) by mouth 4 (four) times daily as needed for Diarrhea.  Disp: 30 capsule Rfl: 3   Calcium Carbonate Antacid 500 MG Oral Chew Tab Chew 1 tablet (500 m distal subtotal, w/wo splenectomy w/ pancreaticojejunosotomy by Dr. Yevgeniy Hobson at [facility] on 7/12/17.      Plan:    1.) Pancreatic Cancer- resectable disease; s/p surgical resection on 7/12/17; pyT2N2    --Patient treated with 1 cycle of neoadjuvant FOLF weeks of surgery and that extended delays in treating pancreatic cancer can lead to potential tumor growth.    --Pt will proceed with cycle 2 of capecitabine+gemcitabine on 10/12/17, initiation of this regimen with cycle 1 began 9 weeks from her operative d to the patient and the family. Juan Reddy MD  [facility]  [address], [facility]

## 2017-10-12 ENCOUNTER — OFFICE VISIT (OUTPATIENT)
Dept: HEMATOLOGY/ONCOLOGY | Facility: HOSPITAL | Age: 72
End: 2017-10-12
Attending: INTERNAL MEDICINE
Payer: MEDICARE

## 2017-10-12 VITALS
RESPIRATION RATE: 16 BRPM | DIASTOLIC BLOOD PRESSURE: 59 MMHG | TEMPERATURE: 98 F | SYSTOLIC BLOOD PRESSURE: 116 MMHG | HEART RATE: 86 BPM | BODY MASS INDEX: 29.22 KG/M2 | HEIGHT: 66.5 IN | WEIGHT: 184 LBS

## 2017-10-12 DIAGNOSIS — Z51.11 CHEMOTHERAPY MANAGEMENT, ENCOUNTER FOR: ICD-10-CM

## 2017-10-12 DIAGNOSIS — C25.1 MALIGNANT NEOPLASM OF BODY OF PANCREAS (HCC): Primary | ICD-10-CM

## 2017-10-12 PROCEDURE — 96413 CHEMO IV INFUSION 1 HR: CPT

## 2017-10-12 PROCEDURE — 96367 TX/PROPH/DG ADDL SEQ IV INF: CPT

## 2017-10-12 RX ORDER — 0.9 % SODIUM CHLORIDE 0.9 %
VIAL (ML) INJECTION
Status: DISCONTINUED
Start: 2017-10-12 | End: 2017-10-12

## 2017-10-12 RX ORDER — HEPARIN SODIUM (PORCINE) LOCK FLUSH IV SOLN 100 UNIT/ML 100 UNIT/ML
SOLUTION INTRAVENOUS
Status: COMPLETED
Start: 2017-10-12 | End: 2017-10-12

## 2017-10-12 RX ORDER — 0.9 % SODIUM CHLORIDE 0.9 %
10 VIAL (ML) INJECTION ONCE
Status: CANCELLED | OUTPATIENT
Start: 2017-10-12

## 2017-10-12 RX ORDER — SODIUM CHLORIDE 9 MG/ML
INJECTION, SOLUTION INTRAVENOUS
Status: DISCONTINUED
Start: 2017-10-12 | End: 2017-10-12

## 2017-10-12 RX ORDER — HEPARIN SODIUM (PORCINE) LOCK FLUSH IV SOLN 100 UNIT/ML 100 UNIT/ML
5 SOLUTION INTRAVENOUS ONCE
Status: CANCELLED | OUTPATIENT
Start: 2017-10-12

## 2017-10-12 RX ORDER — HEPARIN SODIUM (PORCINE) LOCK FLUSH IV SOLN 100 UNIT/ML 100 UNIT/ML
5 SOLUTION INTRAVENOUS ONCE
Status: COMPLETED | OUTPATIENT
Start: 2017-10-12 | End: 2017-10-12

## 2017-10-12 RX ADMIN — HEPARIN SODIUM (PORCINE) LOCK FLUSH IV SOLN 100 UNIT/ML 500 UNITS: 100 SOLUTION INTRAVENOUS at 12:20:00

## 2017-10-12 NOTE — PROGRESS NOTES
Post Chemo Documentation    Patient is here for treatment today, Cycle 2, Day 1 Gemzar. Pt state she took oral chemo today at home prior to infusion appointment.     Arrives Ambulating independently      Accompanied by Other self Use of chemotherapy spill kit (if applicable): No    Psychosocial concerns or referrals made:  None today. Cancer treatment education, including treatment plan, supportive medications, and post-treatment care, was provided to the patient.     The karla

## 2017-10-13 ENCOUNTER — TELEPHONE (OUTPATIENT)
Dept: HEMATOLOGY/ONCOLOGY | Facility: HOSPITAL | Age: 72
End: 2017-10-13

## 2017-10-13 NOTE — TELEPHONE ENCOUNTER
[place] called and said she had Chemo yesterday and today she woke up with horrible stomach pains, She would described them as sharp pains, she did have a couple bowel movements.  They were all loose stools, with the weekend coming she wants to know if the

## 2017-10-13 NOTE — TELEPHONE ENCOUNTER
As per Dr Jan Dan, patient is to hold xeloda today and tomorrow and may restart on Sunday if feeling 100% better if not call on Monday and we will reassess the plan. Also instructed patient to call if worsening of pain or other symptoms.   Patient verbalize

## 2017-10-13 NOTE — TELEPHONE ENCOUNTER
Returned call to [place], patient complaining of abdominal pain, sharp in middle of belly by belly button that shoots down stomach then stomach gurgles, no gas passing at that time. States pain is short and intermittent.   Has had 2 stools today, light br

## 2017-10-16 ENCOUNTER — TELEPHONE (OUTPATIENT)
Dept: HEMATOLOGY/ONCOLOGY | Facility: HOSPITAL | Age: 72
End: 2017-10-16

## 2017-10-16 NOTE — TELEPHONE ENCOUNTER
Well being call. Feeling much better today. Relates that her abdominal upset was related to being constipated and after she ate some prunes and had a BM she felt better.  Held capecitabine over weekend and resumed this am. Rush Form to a play yesterday with fri

## 2017-10-19 ENCOUNTER — OFFICE VISIT (OUTPATIENT)
Dept: HEMATOLOGY/ONCOLOGY | Facility: HOSPITAL | Age: 72
End: 2017-10-19
Attending: PHYSICIAN ASSISTANT
Payer: MEDICARE

## 2017-10-19 ENCOUNTER — NURSE ONLY (OUTPATIENT)
Dept: HEMATOLOGY/ONCOLOGY | Facility: HOSPITAL | Age: 72
End: 2017-10-19
Attending: INTERNAL MEDICINE
Payer: MEDICARE

## 2017-10-19 VITALS
TEMPERATURE: 98 F | BODY MASS INDEX: 28.9 KG/M2 | SYSTOLIC BLOOD PRESSURE: 123 MMHG | WEIGHT: 182 LBS | DIASTOLIC BLOOD PRESSURE: 62 MMHG | HEART RATE: 80 BPM | HEIGHT: 66.5 IN | RESPIRATION RATE: 16 BRPM

## 2017-10-19 DIAGNOSIS — C25.1 MALIGNANT NEOPLASM OF BODY OF PANCREAS (HCC): Primary | ICD-10-CM

## 2017-10-19 DIAGNOSIS — M72.2 PLANTAR FASCIITIS, BILATERAL: ICD-10-CM

## 2017-10-19 DIAGNOSIS — I89.0 LYMPHEDEMA OF BOTH LOWER EXTREMITIES: ICD-10-CM

## 2017-10-19 DIAGNOSIS — Z51.11 CHEMOTHERAPY MANAGEMENT, ENCOUNTER FOR: ICD-10-CM

## 2017-10-19 PROCEDURE — 85007 BL SMEAR W/DIFF WBC COUNT: CPT

## 2017-10-19 PROCEDURE — 99213 OFFICE O/P EST LOW 20 MIN: CPT | Performed by: PHYSICIAN ASSISTANT

## 2017-10-19 PROCEDURE — 96413 CHEMO IV INFUSION 1 HR: CPT

## 2017-10-19 PROCEDURE — 85027 COMPLETE CBC AUTOMATED: CPT

## 2017-10-19 PROCEDURE — G0463 HOSPITAL OUTPT CLINIC VISIT: HCPCS | Performed by: PHYSICIAN ASSISTANT

## 2017-10-19 PROCEDURE — 85025 COMPLETE CBC W/AUTO DIFF WBC: CPT

## 2017-10-19 PROCEDURE — 96367 TX/PROPH/DG ADDL SEQ IV INF: CPT

## 2017-10-19 RX ORDER — 0.9 % SODIUM CHLORIDE 0.9 %
VIAL (ML) INJECTION
Status: DISCONTINUED
Start: 2017-10-19 | End: 2017-10-19

## 2017-10-19 RX ORDER — HEPARIN SODIUM (PORCINE) LOCK FLUSH IV SOLN 100 UNIT/ML 100 UNIT/ML
5 SOLUTION INTRAVENOUS ONCE
Status: COMPLETED | OUTPATIENT
Start: 2017-10-19 | End: 2017-10-19

## 2017-10-19 RX ORDER — SODIUM CHLORIDE 9 MG/ML
INJECTION, SOLUTION INTRAVENOUS
Status: DISCONTINUED
Start: 2017-10-19 | End: 2017-10-19

## 2017-10-19 RX ORDER — HEPARIN SODIUM (PORCINE) LOCK FLUSH IV SOLN 100 UNIT/ML 100 UNIT/ML
5 SOLUTION INTRAVENOUS ONCE
Status: CANCELLED | OUTPATIENT
Start: 2017-10-19

## 2017-10-19 RX ORDER — 0.9 % SODIUM CHLORIDE 0.9 %
10 VIAL (ML) INJECTION ONCE
Status: CANCELLED | OUTPATIENT
Start: 2017-10-19

## 2017-10-19 RX ORDER — HEPARIN SODIUM (PORCINE) LOCK FLUSH IV SOLN 100 UNIT/ML 100 UNIT/ML
SOLUTION INTRAVENOUS
Status: COMPLETED
Start: 2017-10-19 | End: 2017-10-19

## 2017-10-19 RX ADMIN — HEPARIN SODIUM (PORCINE) LOCK FLUSH IV SOLN 100 UNIT/ML 500 UNITS: 100 SOLUTION INTRAVENOUS at 13:07:00

## 2017-10-19 NOTE — PROGRESS NOTES
Post Chemo Documentation    Pt here for treatment today, C2D8 Gemzar. Takes oral chemo concurrently w/ Gemzar. Arrives Ambulating independently.  Verbalizes complaints:  Fatigue, constipation after last treatment w/ abd pain, bilateral LE pitting edema wi warm/red  monitor response to meds  encourage use of stool softener    Treatment and Evaluation:  WBC's recovered from last weeks treatment. Encouraged good hand/oral hygiene. Call MD with fever greater than 100.4. Pt verbalized understanding.   Will con

## 2017-10-19 NOTE — PROGRESS NOTES
S:  81year old patient presents today for Cycle 2 Day 8 gemcitabine/capecitabine. Lab RN reports BLE edema and patient's complaint of increasing aching in her legs. Upon further inquiry, patient stated her BLE edema is not worse.   She is aware of he

## 2017-10-19 NOTE — PROGRESS NOTES
Port accessed using sterile technique, cbc drawn and sent. Port flushed with a total of 30ml ns, capped, clamped, and secured. [place] appeared to tolerate. Having edema to bilateral ankles which feels achy.   Discharged from lab stable to lobby to Ambature

## 2017-10-26 ENCOUNTER — OFFICE VISIT (OUTPATIENT)
Dept: HEMATOLOGY/ONCOLOGY | Facility: HOSPITAL | Age: 72
End: 2017-10-26
Attending: INTERNAL MEDICINE
Payer: MEDICARE

## 2017-10-26 VITALS
BODY MASS INDEX: 29.22 KG/M2 | DIASTOLIC BLOOD PRESSURE: 60 MMHG | SYSTOLIC BLOOD PRESSURE: 131 MMHG | HEART RATE: 71 BPM | RESPIRATION RATE: 16 BRPM | TEMPERATURE: 97 F | WEIGHT: 184 LBS | HEIGHT: 66.5 IN

## 2017-10-26 DIAGNOSIS — C25.1 MALIGNANT NEOPLASM OF BODY OF PANCREAS (HCC): Primary | ICD-10-CM

## 2017-10-26 DIAGNOSIS — Z51.11 CHEMOTHERAPY MANAGEMENT, ENCOUNTER FOR: ICD-10-CM

## 2017-10-26 PROCEDURE — 85025 COMPLETE CBC W/AUTO DIFF WBC: CPT

## 2017-10-26 PROCEDURE — 96413 CHEMO IV INFUSION 1 HR: CPT

## 2017-10-26 PROCEDURE — 96375 TX/PRO/DX INJ NEW DRUG ADDON: CPT

## 2017-10-26 RX ORDER — 0.9 % SODIUM CHLORIDE 0.9 %
10 VIAL (ML) INJECTION ONCE
Status: CANCELLED | OUTPATIENT
Start: 2017-10-26

## 2017-10-26 RX ORDER — 0.9 % SODIUM CHLORIDE 0.9 %
VIAL (ML) INJECTION
Status: DISCONTINUED
Start: 2017-10-26 | End: 2017-10-26

## 2017-10-26 RX ORDER — HEPARIN SODIUM (PORCINE) LOCK FLUSH IV SOLN 100 UNIT/ML 100 UNIT/ML
5 SOLUTION INTRAVENOUS ONCE
Status: COMPLETED | OUTPATIENT
Start: 2017-10-26 | End: 2017-10-26

## 2017-10-26 RX ORDER — HEPARIN SODIUM (PORCINE) LOCK FLUSH IV SOLN 100 UNIT/ML 100 UNIT/ML
SOLUTION INTRAVENOUS
Status: COMPLETED
Start: 2017-10-26 | End: 2017-10-26

## 2017-10-26 RX ORDER — SODIUM CHLORIDE 9 MG/ML
INJECTION, SOLUTION INTRAVENOUS
Status: DISCONTINUED
Start: 2017-10-26 | End: 2017-10-26

## 2017-10-26 RX ORDER — HEPARIN SODIUM (PORCINE) LOCK FLUSH IV SOLN 100 UNIT/ML 100 UNIT/ML
5 SOLUTION INTRAVENOUS ONCE
Status: CANCELLED | OUTPATIENT
Start: 2017-10-26

## 2017-10-26 RX ADMIN — HEPARIN SODIUM (PORCINE) LOCK FLUSH IV SOLN 100 UNIT/ML 500 UNITS: 100 SOLUTION INTRAVENOUS at 12:30:00

## 2017-10-26 NOTE — PROGRESS NOTES
Post Chemo Documentation    Patient is here for treatment today, Cycle2 Day 15    Arrives Ambulating independently      Accompanied by Other Self                                 Modifications in dose or schedule: No    States she is tired, yesterday was th None    Safety/handling:  raymond AMADORTCHMARIBELL  Reviewed symptoms to report to doctor and phone number to call Dr Joelle Paredes at [phone]    Discharged to Home     Discharged Ambulating independently    Accompanied by: Other Self

## 2017-10-26 NOTE — PROGRESS NOTES
Pt here for pre-chemo labs. Port accessed using sterile technique. Labs collected per order. Flushed w/ NS. Left accessed for possible treatment. Port secured w/ steri-strips et tegaderm. Appeared to tolerate well.   Discharged from lab, ambulating ind

## 2017-11-01 NOTE — MR AVS SNAPSHOT
[address] [place] [address]   2017 10:00 AM   Office Visit   MRN:  [id number]    Description:  Female : 1945   Provider:  Yosef Franks   Department:  [facility] Hematology Oncology              Visit Summary      Primary Visit Diagnosis medical record, we can assist you to set up an appointment with the appropriate medical professional to review your records.       To Do List     Thursday April 20, 2017 1:00 PM     Appointment with Vicente Parker at [facility] (3 no JVD/supple

## 2017-11-08 ENCOUNTER — NURSE ONLY (OUTPATIENT)
Dept: HEMATOLOGY/ONCOLOGY | Facility: HOSPITAL | Age: 72
End: 2017-11-08
Attending: INTERNAL MEDICINE
Payer: MEDICARE

## 2017-11-08 ENCOUNTER — OFFICE VISIT (OUTPATIENT)
Dept: HEMATOLOGY/ONCOLOGY | Facility: HOSPITAL | Age: 72
End: 2017-11-08
Attending: PHYSICIAN ASSISTANT
Payer: MEDICARE

## 2017-11-08 VITALS
TEMPERATURE: 98 F | SYSTOLIC BLOOD PRESSURE: 102 MMHG | HEIGHT: 66.5 IN | DIASTOLIC BLOOD PRESSURE: 57 MMHG | HEART RATE: 85 BPM | RESPIRATION RATE: 18 BRPM | BODY MASS INDEX: 29.06 KG/M2 | WEIGHT: 183 LBS

## 2017-11-08 DIAGNOSIS — C25.1 MALIGNANT NEOPLASM OF BODY OF PANCREAS (HCC): ICD-10-CM

## 2017-11-08 DIAGNOSIS — B19.20 HEPATITIS C VIRUS INFECTION WITHOUT HEPATIC COMA, UNSPECIFIED CHRONICITY: ICD-10-CM

## 2017-11-08 DIAGNOSIS — R53.83 FATIGUE, UNSPECIFIED TYPE: ICD-10-CM

## 2017-11-08 DIAGNOSIS — F41.9 ANXIETY: ICD-10-CM

## 2017-11-08 DIAGNOSIS — C25.1 MALIGNANT NEOPLASM OF BODY OF PANCREAS (HCC): Primary | ICD-10-CM

## 2017-11-08 DIAGNOSIS — Z51.11 CHEMOTHERAPY MANAGEMENT, ENCOUNTER FOR: Primary | ICD-10-CM

## 2017-11-08 DIAGNOSIS — Z51.11 CHEMOTHERAPY MANAGEMENT, ENCOUNTER FOR: ICD-10-CM

## 2017-11-08 PROCEDURE — 86301 IMMUNOASSAY TUMOR CA 19-9: CPT

## 2017-11-08 PROCEDURE — G0463 HOSPITAL OUTPT CLINIC VISIT: HCPCS | Performed by: INTERNAL MEDICINE

## 2017-11-08 PROCEDURE — 99215 OFFICE O/P EST HI 40 MIN: CPT | Performed by: INTERNAL MEDICINE

## 2017-11-08 PROCEDURE — 80053 COMPREHEN METABOLIC PANEL: CPT

## 2017-11-08 PROCEDURE — 85025 COMPLETE CBC W/AUTO DIFF WBC: CPT

## 2017-11-08 PROCEDURE — 36591 DRAW BLOOD OFF VENOUS DEVICE: CPT

## 2017-11-08 RX ORDER — PROCHLORPERAZINE MALEATE 10 MG
10 TABLET ORAL EVERY 6 HOURS PRN
Status: CANCELLED | OUTPATIENT
Start: 2017-11-09

## 2017-11-08 RX ORDER — LORAZEPAM 1 MG/1
TABLET ORAL EVERY 4 HOURS PRN
Status: CANCELLED | OUTPATIENT
Start: 2017-11-09

## 2017-11-08 RX ORDER — 0.9 % SODIUM CHLORIDE 0.9 %
VIAL (ML) INJECTION
Status: DISCONTINUED
Start: 2017-11-08 | End: 2017-11-08

## 2017-11-08 RX ORDER — LORAZEPAM 1 MG/1
TABLET ORAL EVERY 4 HOURS PRN
Status: CANCELLED | OUTPATIENT
Start: 2017-11-16

## 2017-11-08 RX ORDER — METOCLOPRAMIDE HYDROCHLORIDE 5 MG/ML
10 INJECTION INTRAMUSCULAR; INTRAVENOUS EVERY 6 HOURS PRN
Status: CANCELLED | OUTPATIENT
Start: 2017-11-09

## 2017-11-08 RX ORDER — HEPARIN SODIUM (PORCINE) LOCK FLUSH IV SOLN 100 UNIT/ML 100 UNIT/ML
SOLUTION INTRAVENOUS
Status: COMPLETED
Start: 2017-11-08 | End: 2017-11-08

## 2017-11-08 RX ORDER — LORAZEPAM 2 MG/ML
INJECTION INTRAMUSCULAR EVERY 4 HOURS PRN
Status: CANCELLED | OUTPATIENT
Start: 2017-11-16

## 2017-11-08 RX ORDER — LORAZEPAM 2 MG/ML
INJECTION INTRAMUSCULAR EVERY 4 HOURS PRN
Status: CANCELLED | OUTPATIENT
Start: 2017-11-09

## 2017-11-08 RX ORDER — 0.9 % SODIUM CHLORIDE 0.9 %
10 VIAL (ML) INJECTION ONCE
Status: CANCELLED | OUTPATIENT
Start: 2017-11-08

## 2017-11-08 RX ORDER — LORAZEPAM 2 MG/ML
INJECTION INTRAMUSCULAR EVERY 4 HOURS PRN
Status: CANCELLED | OUTPATIENT
Start: 2017-11-23

## 2017-11-08 RX ORDER — METOCLOPRAMIDE HYDROCHLORIDE 5 MG/ML
10 INJECTION INTRAMUSCULAR; INTRAVENOUS EVERY 6 HOURS PRN
Status: CANCELLED | OUTPATIENT
Start: 2017-11-16

## 2017-11-08 RX ORDER — PROCHLORPERAZINE MALEATE 10 MG
10 TABLET ORAL EVERY 6 HOURS PRN
Status: CANCELLED | OUTPATIENT
Start: 2017-11-23

## 2017-11-08 RX ORDER — LORAZEPAM 1 MG/1
TABLET ORAL EVERY 4 HOURS PRN
Status: CANCELLED | OUTPATIENT
Start: 2017-11-23

## 2017-11-08 RX ORDER — HEPARIN SODIUM (PORCINE) LOCK FLUSH IV SOLN 100 UNIT/ML 100 UNIT/ML
5 SOLUTION INTRAVENOUS ONCE
Status: CANCELLED | OUTPATIENT
Start: 2017-11-08

## 2017-11-08 RX ORDER — METOCLOPRAMIDE HYDROCHLORIDE 5 MG/ML
10 INJECTION INTRAMUSCULAR; INTRAVENOUS EVERY 6 HOURS PRN
Status: CANCELLED | OUTPATIENT
Start: 2017-11-23

## 2017-11-08 RX ORDER — HEPARIN SODIUM (PORCINE) LOCK FLUSH IV SOLN 100 UNIT/ML 100 UNIT/ML
5 SOLUTION INTRAVENOUS ONCE
Status: COMPLETED | OUTPATIENT
Start: 2017-11-08 | End: 2017-11-08

## 2017-11-08 RX ORDER — PROCHLORPERAZINE MALEATE 10 MG
10 TABLET ORAL EVERY 6 HOURS PRN
Status: CANCELLED | OUTPATIENT
Start: 2017-11-16

## 2017-11-08 RX ADMIN — HEPARIN SODIUM (PORCINE) LOCK FLUSH IV SOLN 100 UNIT/ML 500 UNITS: 100 SOLUTION INTRAVENOUS at 11:30:00

## 2017-11-08 NOTE — PROGRESS NOTES
[facility] AND [facility]  Hematology/Oncology  Progress Note    Saint Ohs CSN: [id number]    1945 MRN [id number]   Location [address] [facility] [address] [address] Attending Sherrill Mcgowan MD   Date of Visit 17 PCP Johana Jon MD     Chief Complaint: R Thursday, complicated by a pneumothorax and is currently recovering with mild dyspnea. She was incidentally screened for hepatitis C and found to be positive.  [place] underwent surgical pancreatic resection on 7/217 at [facility] by Dr. Sabine Bunn after temperature 97.8 °F (36.6 °C), temperature source Oral, resp. rate 18, height 1.689 m (5' 6.5\"), weight 83 kg (183 lb). General: Patient is alert and oriented x 3, not in acute distress.    Psych: Nervous, anxious but with appropriate questions and resp tablet Rfl: 11   Loperamide HCl 2 MG Oral Cap Take 1 capsule (2 mg total) by mouth 4 (four) times daily as needed for Diarrhea.  Disp: 30 capsule Rfl: 3   Calcium Carbonate Antacid 500 MG Oral Chew Tab Chew 1 tablet (500 mg total) by mouth 3 (three) times d distal subtotal, w/wo splenectomy w/ pancreaticojejunosotomy by Dr. Latesha Sinclair at [facility] on 7/12/17.      Plan:    1.) Pancreatic Cancer- resectable disease; s/p surgical resection on 7/12/17; pyT2N2    --Patient treated with 1 cycle of neoadjuvant FOLF weeks of surgery and that extended delays in treating pancreatic cancer can lead to potential tumor growth.    --Pt will proceed with cycle 3 of capecitabine+gemcitabine on 11/09/17, initiation of this regimen with cycle 1 began 9 weeks from her operative d diagnosis, prognosis, and general treatment was explained to the patient and the family. Rupal De La Torre MD  [facility]  [address] [place] [address]  [address] [place] [address]

## 2017-11-08 NOTE — PROGRESS NOTES
Port accessed using sterile technique, labs drawn and sent. Port deaccessed, site covered with 2x2 and paper tape. Pt appeared to tolerate. Discharged from lab stable.

## 2017-11-09 ENCOUNTER — OFFICE VISIT (OUTPATIENT)
Dept: HEMATOLOGY/ONCOLOGY | Facility: HOSPITAL | Age: 72
End: 2017-11-09
Attending: INTERNAL MEDICINE
Payer: MEDICARE

## 2017-11-09 VITALS
DIASTOLIC BLOOD PRESSURE: 61 MMHG | TEMPERATURE: 98 F | HEART RATE: 92 BPM | SYSTOLIC BLOOD PRESSURE: 141 MMHG | RESPIRATION RATE: 16 BRPM

## 2017-11-09 DIAGNOSIS — Z51.11 CHEMOTHERAPY MANAGEMENT, ENCOUNTER FOR: ICD-10-CM

## 2017-11-09 DIAGNOSIS — C25.1 MALIGNANT NEOPLASM OF BODY OF PANCREAS (HCC): Primary | ICD-10-CM

## 2017-11-09 PROCEDURE — 96413 CHEMO IV INFUSION 1 HR: CPT

## 2017-11-09 PROCEDURE — 96367 TX/PROPH/DG ADDL SEQ IV INF: CPT

## 2017-11-09 RX ORDER — 0.9 % SODIUM CHLORIDE 0.9 %
10 VIAL (ML) INJECTION ONCE
Status: CANCELLED | OUTPATIENT
Start: 2017-11-09

## 2017-11-09 RX ORDER — HEPARIN SODIUM (PORCINE) LOCK FLUSH IV SOLN 100 UNIT/ML 100 UNIT/ML
5 SOLUTION INTRAVENOUS ONCE
Status: CANCELLED | OUTPATIENT
Start: 2017-11-09

## 2017-11-09 RX ORDER — 0.9 % SODIUM CHLORIDE 0.9 %
VIAL (ML) INJECTION
Status: DISCONTINUED
Start: 2017-11-09 | End: 2017-11-09

## 2017-11-09 RX ORDER — HEPARIN SODIUM (PORCINE) LOCK FLUSH IV SOLN 100 UNIT/ML 100 UNIT/ML
SOLUTION INTRAVENOUS
Status: COMPLETED
Start: 2017-11-09 | End: 2017-11-09

## 2017-11-09 RX ORDER — SODIUM CHLORIDE 9 MG/ML
INJECTION, SOLUTION INTRAVENOUS
Status: DISCONTINUED
Start: 2017-11-09 | End: 2017-11-09

## 2017-11-09 RX ORDER — HEPARIN SODIUM (PORCINE) LOCK FLUSH IV SOLN 100 UNIT/ML 100 UNIT/ML
5 SOLUTION INTRAVENOUS ONCE
Status: COMPLETED | OUTPATIENT
Start: 2017-11-09 | End: 2017-11-09

## 2017-11-09 RX ADMIN — HEPARIN SODIUM (PORCINE) LOCK FLUSH IV SOLN 100 UNIT/ML 500 UNITS: 100 SOLUTION INTRAVENOUS at 12:35:00

## 2017-11-13 ENCOUNTER — TELEPHONE (OUTPATIENT)
Dept: HEMATOLOGY/ONCOLOGY | Facility: HOSPITAL | Age: 72
End: 2017-11-13

## 2017-11-13 NOTE — TELEPHONE ENCOUNTER
[place] wanted to call Dr. Karla Abernathy just to let him know she went to immediate care today, she was having pain in her foot, turned out to be an infection, they are giving her fluids and also prescribed a antibiotic, but she thought she should let the doctor

## 2017-11-13 NOTE — TELEPHONE ENCOUNTER
Reviewed documentation from [facility]. Appears to have a cellulitis in her foot. Call placed to patient to determine/confirm and LMTCB.

## 2017-11-13 NOTE — TELEPHONE ENCOUNTER
Call from [address] Nathaly Fisher. She is still at [facility] in [place]. She is getting IV hydration. She reports that on Thursday morning last week, she awoke with pain in her right foot and ankle.  At the time she felt it was a \"tendonitis\" and by the time she c

## 2017-11-16 ENCOUNTER — OFFICE VISIT (OUTPATIENT)
Dept: HEMATOLOGY/ONCOLOGY | Facility: HOSPITAL | Age: 72
End: 2017-11-16
Attending: INTERNAL MEDICINE
Payer: MEDICARE

## 2017-11-16 ENCOUNTER — HOSPITAL ENCOUNTER (OUTPATIENT)
Dept: ULTRASOUND IMAGING | Facility: HOSPITAL | Age: 72
Discharge: HOME | End: 2017-11-16
Attending: PHYSICIAN ASSISTANT
Payer: MEDICARE

## 2017-11-16 ENCOUNTER — OFFICE VISIT (OUTPATIENT)
Dept: HEMATOLOGY/ONCOLOGY | Facility: HOSPITAL | Age: 72
End: 2017-11-16
Attending: PHYSICIAN ASSISTANT
Payer: MEDICARE

## 2017-11-16 VITALS
HEART RATE: 93 BPM | SYSTOLIC BLOOD PRESSURE: 115 MMHG | RESPIRATION RATE: 18 BRPM | TEMPERATURE: 98 F | BODY MASS INDEX: 29.06 KG/M2 | HEIGHT: 66.5 IN | DIASTOLIC BLOOD PRESSURE: 60 MMHG | OXYGEN SATURATION: 95 % | WEIGHT: 183 LBS

## 2017-11-16 DIAGNOSIS — C25.9 MALIGNANT NEOPLASM OF PANCREAS, UNSPECIFIED LOCATION OF MALIGNANCY (HCC): ICD-10-CM

## 2017-11-16 DIAGNOSIS — R60.0 EDEMA OF RIGHT FOOT: ICD-10-CM

## 2017-11-16 DIAGNOSIS — C25.1 MALIGNANT NEOPLASM OF BODY OF PANCREAS (HCC): ICD-10-CM

## 2017-11-16 DIAGNOSIS — R60.0 EDEMA OF RIGHT FOOT: Primary | ICD-10-CM

## 2017-11-16 DIAGNOSIS — L03.115 CELLULITIS OF RIGHT LOWER LEG: ICD-10-CM

## 2017-11-16 DIAGNOSIS — C25.1 MALIGNANT NEOPLASM OF BODY OF PANCREAS (HCC): Primary | ICD-10-CM

## 2017-11-16 PROCEDURE — 85027 COMPLETE CBC AUTOMATED: CPT

## 2017-11-16 PROCEDURE — 99213 OFFICE O/P EST LOW 20 MIN: CPT | Performed by: PHYSICIAN ASSISTANT

## 2017-11-16 PROCEDURE — 36591 DRAW BLOOD OFF VENOUS DEVICE: CPT

## 2017-11-16 PROCEDURE — G0463 HOSPITAL OUTPT CLINIC VISIT: HCPCS | Performed by: PHYSICIAN ASSISTANT

## 2017-11-16 PROCEDURE — 85007 BL SMEAR W/DIFF WBC COUNT: CPT

## 2017-11-16 PROCEDURE — 93971 EXTREMITY STUDY: CPT | Performed by: PHYSICIAN ASSISTANT

## 2017-11-16 PROCEDURE — 85025 COMPLETE CBC W/AUTO DIFF WBC: CPT

## 2017-11-16 RX ORDER — HEPARIN SODIUM (PORCINE) LOCK FLUSH IV SOLN 100 UNIT/ML 100 UNIT/ML
SOLUTION INTRAVENOUS
Status: DISCONTINUED
Start: 2017-11-16 | End: 2017-11-16

## 2017-11-16 RX ORDER — 0.9 % SODIUM CHLORIDE 0.9 %
VIAL (ML) INJECTION
Status: DISCONTINUED
Start: 2017-11-16 | End: 2017-11-16

## 2017-11-16 NOTE — PROGRESS NOTES
Patient ambulatory to lab from physician office for pre chemotherapy labs. Patient seen by VENTURA Nash for followup on right foot with erythema and edema.   Patient reports had received iv antibiotic therapy at immediate care in [place] for cellulitis of

## 2017-11-16 NOTE — PROGRESS NOTES
Treatment canceled today, refer to notes from Elias Vasquez. Ronal Triana by [place], [place] discharged directly from exam with Ace.

## 2017-11-16 NOTE — PROGRESS NOTES
S:  81year old female presents today for follow-up on right pedal edema and RLE cellulitis that started 6 days ago. She has been evaluated/monitored for this 3 times at the immediate care center. She denies fever/sweats but she always has chills.   She ha capecitabine. Complete clindamycin. Call if condition worsens or new symptoms develop. Plan to proceed with C3D15 gemcitabine on 11/24/17, if labs are within parameters, and cellulitis completely resolved.   In addition, on 11/24/17, would resume the rem

## 2017-11-20 ENCOUNTER — TELEPHONE (OUTPATIENT)
Dept: HEMATOLOGY/ONCOLOGY | Facility: HOSPITAL | Age: 72
End: 2017-11-20

## 2017-11-20 RX ORDER — PANTOPRAZOLE SODIUM 40 MG/1
40 TABLET, DELAYED RELEASE ORAL
Qty: 30 TABLET | Refills: 0 | Status: SHIPPED | OUTPATIENT
Start: 2017-11-20 | End: 2018-01-01

## 2017-11-20 NOTE — TELEPHONE ENCOUNTER
[place] called and said she has been having terrible heartburn since Saturday, she has taken almost every over the counter medicine to help and nothing has worked.  She just ate a piece of banana bread and again her chest is on fire again, please advise

## 2017-11-20 NOTE — TELEPHONE ENCOUNTER
Toxicities:  C2D8 Gemzar held on 11/16/2017 due to right foot cellulitis. Pt took her last dose of Xeloda on 11/16/2017 before she came in for her chemo infusion. Heartburn    Fever: Grade 0 (Denies c,s/f.  She is on clindamycin 300mg po QID for cellulit

## 2017-11-20 NOTE — TELEPHONE ENCOUNTER
I called and updated [place] that Pedro Griffin has sent a prescription for Pantoprazole sodium to her pharmacy. She should pick it up and take one this afternoon. She then should take one tab everyday in the morning before she eats or drinks.  I also told her

## 2017-11-24 ENCOUNTER — NURSE ONLY (OUTPATIENT)
Dept: HEMATOLOGY/ONCOLOGY | Facility: HOSPITAL | Age: 72
End: 2017-11-24
Attending: INTERNAL MEDICINE
Payer: MEDICARE

## 2017-11-24 ENCOUNTER — OFFICE VISIT (OUTPATIENT)
Dept: HEMATOLOGY/ONCOLOGY | Facility: HOSPITAL | Age: 72
End: 2017-11-24
Attending: NURSE PRACTITIONER
Payer: MEDICARE

## 2017-11-24 VITALS
WEIGHT: 183 LBS | HEIGHT: 66.5 IN | DIASTOLIC BLOOD PRESSURE: 63 MMHG | BODY MASS INDEX: 29.06 KG/M2 | HEART RATE: 87 BPM | TEMPERATURE: 98 F | SYSTOLIC BLOOD PRESSURE: 116 MMHG | RESPIRATION RATE: 16 BRPM

## 2017-11-24 DIAGNOSIS — C25.1 MALIGNANT NEOPLASM OF BODY OF PANCREAS (HCC): Primary | ICD-10-CM

## 2017-11-24 DIAGNOSIS — Z09 CHEMOTHERAPY FOLLOW-UP EXAMINATION: ICD-10-CM

## 2017-11-24 DIAGNOSIS — Z51.11 CHEMOTHERAPY MANAGEMENT, ENCOUNTER FOR: ICD-10-CM

## 2017-11-24 PROCEDURE — 99213 OFFICE O/P EST LOW 20 MIN: CPT | Performed by: NURSE PRACTITIONER

## 2017-11-24 PROCEDURE — 96375 TX/PRO/DX INJ NEW DRUG ADDON: CPT

## 2017-11-24 PROCEDURE — 96413 CHEMO IV INFUSION 1 HR: CPT

## 2017-11-24 PROCEDURE — G0463 HOSPITAL OUTPT CLINIC VISIT: HCPCS | Performed by: NURSE PRACTITIONER

## 2017-11-24 PROCEDURE — 85025 COMPLETE CBC W/AUTO DIFF WBC: CPT

## 2017-11-24 RX ORDER — 0.9 % SODIUM CHLORIDE 0.9 %
10 VIAL (ML) INJECTION ONCE
Status: CANCELLED | OUTPATIENT
Start: 2017-11-24

## 2017-11-24 RX ORDER — HEPARIN SODIUM (PORCINE) LOCK FLUSH IV SOLN 100 UNIT/ML 100 UNIT/ML
5 SOLUTION INTRAVENOUS ONCE
Status: COMPLETED | OUTPATIENT
Start: 2017-11-24 | End: 2017-11-24

## 2017-11-24 RX ORDER — 0.9 % SODIUM CHLORIDE 0.9 %
VIAL (ML) INJECTION
Status: DISCONTINUED
Start: 2017-11-24 | End: 2017-11-24

## 2017-11-24 RX ORDER — SODIUM CHLORIDE 9 MG/ML
INJECTION, SOLUTION INTRAVENOUS
Status: DISCONTINUED
Start: 2017-11-24 | End: 2017-11-24

## 2017-11-24 RX ORDER — HEPARIN SODIUM (PORCINE) LOCK FLUSH IV SOLN 100 UNIT/ML 100 UNIT/ML
5 SOLUTION INTRAVENOUS ONCE
Status: CANCELLED | OUTPATIENT
Start: 2017-11-24

## 2017-11-24 RX ORDER — HEPARIN SODIUM (PORCINE) LOCK FLUSH IV SOLN 100 UNIT/ML 100 UNIT/ML
SOLUTION INTRAVENOUS
Status: DISCONTINUED
Start: 2017-11-24 | End: 2017-11-24

## 2017-11-24 RX ADMIN — HEPARIN SODIUM (PORCINE) LOCK FLUSH IV SOLN 100 UNIT/ML 500 UNITS: 100 SOLUTION INTRAVENOUS at 14:00:00

## 2017-11-24 NOTE — PROGRESS NOTES
Post Chemo Documentation    Patient is here for treatment today, Cycle3, Day 15.       Arrives Ambulating independently      Accompanied by Other self                                 Modifications in dose or schedule: No      Verbalizes complaints fatigue; infection/cellulitis. Ok to [facility]. Tolerated treatment without incident. Port flushed per protocol and de accessed with 2x2 and paper tape to site. Discharged to home in stable condition.       [facility]

## 2017-11-24 NOTE — PROGRESS NOTES
Ambulatory to lab, port cannulated per protocol, immediate blood return noted, labs collected without difficulty, post flush with saline, capped and secured to chest. Discharged amb and stable to waiting area.

## 2017-11-24 NOTE — PROGRESS NOTES
HPI     S:  81year old female presents today for follow-up on right pedal edema and RLE cellulitis. She denies fever/sweats but she always has chills. She has been on clindamyin 300 mg QID completed today.  Patient reports that the erythema has improved Tab Take 1 tablet (200 mg total) by mouth every other day. Disp: 15 tablet Rfl: 11   Loperamide HCl 2 MG Oral Cap Take 1 capsule (2 mg total) by mouth 4 (four) times daily as needed for Diarrhea.  Disp: 30 capsule Rfl: 3   Calcium Carbonate Antacid 500 MG O in [employer], Samira Stoll. 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 She enjoys dining out, swimming, enjoys yoga, spending time with friends. [place] previously lived in [place] full time, but now spends about 3-4 months there. 11/24/17. No orders of the defined types were placed in this encounter.       Results From Past 48 Hours:    Recent Results (from the past 48 hour(s))  -CBC W/ DIFFERENTIAL   Collection Time: 11/24/17 11:23 AM   Result Value Ref Range   WBC 11.6

## 2017-12-06 ENCOUNTER — NURSE ONLY (OUTPATIENT)
Dept: HEMATOLOGY/ONCOLOGY | Facility: HOSPITAL | Age: 72
End: 2017-12-06
Attending: INTERNAL MEDICINE
Payer: MEDICARE

## 2017-12-06 ENCOUNTER — OFFICE VISIT (OUTPATIENT)
Dept: HEMATOLOGY/ONCOLOGY | Facility: HOSPITAL | Age: 72
End: 2017-12-06
Attending: PHYSICIAN ASSISTANT
Payer: MEDICARE

## 2017-12-06 ENCOUNTER — TELEPHONE (OUTPATIENT)
Dept: HEMATOLOGY/ONCOLOGY | Facility: HOSPITAL | Age: 72
End: 2017-12-06

## 2017-12-06 VITALS
TEMPERATURE: 98 F | BODY MASS INDEX: 28.9 KG/M2 | WEIGHT: 182 LBS | RESPIRATION RATE: 16 BRPM | HEIGHT: 66.5 IN | HEART RATE: 85 BPM | SYSTOLIC BLOOD PRESSURE: 89 MMHG | DIASTOLIC BLOOD PRESSURE: 73 MMHG

## 2017-12-06 DIAGNOSIS — B19.20 HEPATITIS C VIRUS INFECTION WITHOUT HEPATIC COMA, UNSPECIFIED CHRONICITY: ICD-10-CM

## 2017-12-06 DIAGNOSIS — C25.1 MALIGNANT NEOPLASM OF BODY OF PANCREAS (HCC): ICD-10-CM

## 2017-12-06 DIAGNOSIS — I95.1 ORTHOSTATIC HYPOTENSION: Primary | ICD-10-CM

## 2017-12-06 DIAGNOSIS — Z51.11 CHEMOTHERAPY MANAGEMENT, ENCOUNTER FOR: ICD-10-CM

## 2017-12-06 DIAGNOSIS — C25.1 MALIGNANT NEOPLASM OF BODY OF PANCREAS (HCC): Primary | ICD-10-CM

## 2017-12-06 DIAGNOSIS — L03.115 CELLULITIS OF RIGHT LOWER LEG: ICD-10-CM

## 2017-12-06 PROCEDURE — 99215 OFFICE O/P EST HI 40 MIN: CPT | Performed by: INTERNAL MEDICINE

## 2017-12-06 PROCEDURE — 36591 DRAW BLOOD OFF VENOUS DEVICE: CPT

## 2017-12-06 PROCEDURE — 80053 COMPREHEN METABOLIC PANEL: CPT

## 2017-12-06 PROCEDURE — G0463 HOSPITAL OUTPT CLINIC VISIT: HCPCS | Performed by: INTERNAL MEDICINE

## 2017-12-06 PROCEDURE — 85025 COMPLETE CBC W/AUTO DIFF WBC: CPT

## 2017-12-06 RX ORDER — LORAZEPAM 1 MG/1
TABLET ORAL EVERY 4 HOURS PRN
Status: CANCELLED | OUTPATIENT
Start: 2017-12-21

## 2017-12-06 RX ORDER — LORAZEPAM 2 MG/ML
INJECTION INTRAMUSCULAR EVERY 4 HOURS PRN
Status: CANCELLED | OUTPATIENT
Start: 2017-12-21

## 2017-12-06 RX ORDER — PROCHLORPERAZINE MALEATE 10 MG
10 TABLET ORAL EVERY 6 HOURS PRN
Status: CANCELLED | OUTPATIENT
Start: 2017-12-21

## 2017-12-06 RX ORDER — 0.9 % SODIUM CHLORIDE 0.9 %
VIAL (ML) INJECTION
Status: DISCONTINUED
Start: 2017-12-06 | End: 2017-12-06

## 2017-12-06 RX ORDER — 0.9 % SODIUM CHLORIDE 0.9 %
10 VIAL (ML) INJECTION ONCE
Status: CANCELLED | OUTPATIENT
Start: 2017-12-06

## 2017-12-06 RX ORDER — LORAZEPAM 1 MG/1
TABLET ORAL EVERY 4 HOURS PRN
Status: CANCELLED | OUTPATIENT
Start: 2017-12-14

## 2017-12-06 RX ORDER — METOCLOPRAMIDE HYDROCHLORIDE 5 MG/ML
10 INJECTION INTRAMUSCULAR; INTRAVENOUS EVERY 6 HOURS PRN
Status: CANCELLED | OUTPATIENT
Start: 2017-12-14

## 2017-12-06 RX ORDER — LORAZEPAM 2 MG/ML
INJECTION INTRAMUSCULAR EVERY 4 HOURS PRN
Status: CANCELLED | OUTPATIENT
Start: 2017-12-14

## 2017-12-06 RX ORDER — HEPARIN SODIUM (PORCINE) LOCK FLUSH IV SOLN 100 UNIT/ML 100 UNIT/ML
SOLUTION INTRAVENOUS
Status: COMPLETED
Start: 2017-12-06 | End: 2017-12-06

## 2017-12-06 RX ORDER — PROCHLORPERAZINE MALEATE 10 MG
10 TABLET ORAL EVERY 6 HOURS PRN
Status: CANCELLED | OUTPATIENT
Start: 2017-12-07

## 2017-12-06 RX ORDER — HEPARIN SODIUM (PORCINE) LOCK FLUSH IV SOLN 100 UNIT/ML 100 UNIT/ML
5 SOLUTION INTRAVENOUS ONCE
Status: COMPLETED | OUTPATIENT
Start: 2017-12-06 | End: 2017-12-06

## 2017-12-06 RX ORDER — PROCHLORPERAZINE MALEATE 10 MG
10 TABLET ORAL EVERY 6 HOURS PRN
Status: CANCELLED | OUTPATIENT
Start: 2017-12-14

## 2017-12-06 RX ORDER — METOCLOPRAMIDE HYDROCHLORIDE 5 MG/ML
10 INJECTION INTRAMUSCULAR; INTRAVENOUS EVERY 6 HOURS PRN
Status: CANCELLED | OUTPATIENT
Start: 2017-12-21

## 2017-12-06 RX ORDER — METOCLOPRAMIDE HYDROCHLORIDE 5 MG/ML
10 INJECTION INTRAMUSCULAR; INTRAVENOUS EVERY 6 HOURS PRN
Status: CANCELLED | OUTPATIENT
Start: 2017-12-07

## 2017-12-06 RX ORDER — HEPARIN SODIUM (PORCINE) LOCK FLUSH IV SOLN 100 UNIT/ML 100 UNIT/ML
5 SOLUTION INTRAVENOUS ONCE
Status: CANCELLED | OUTPATIENT
Start: 2017-12-06

## 2017-12-06 RX ORDER — LORAZEPAM 2 MG/ML
INJECTION INTRAMUSCULAR EVERY 4 HOURS PRN
Status: CANCELLED | OUTPATIENT
Start: 2017-12-07

## 2017-12-06 RX ORDER — LORAZEPAM 1 MG/1
TABLET ORAL EVERY 4 HOURS PRN
Status: CANCELLED | OUTPATIENT
Start: 2017-12-07

## 2017-12-06 RX ADMIN — HEPARIN SODIUM (PORCINE) LOCK FLUSH IV SOLN 100 UNIT/ML 500 UNITS: 100 SOLUTION INTRAVENOUS at 12:15:00

## 2017-12-06 NOTE — TELEPHONE ENCOUNTER
[place] requested a message be left for Dr. Shabbir Lofton RN. She states \" the pharmacy has never called with my Chemo medication. \" I assured her the message would be sent.

## 2017-12-06 NOTE — PROGRESS NOTES
[facility] AND [facility]  Hematology/Oncology  Progress Note    Kush Graves CSN: [id number]    1945 MRN [id number]   Location [address] Attending Jos Jain MD   Date of Visit 17 PCP Jennifer Rothman MD     Chief Complaint: R Thursday, complicated by a pneumothorax and is currently recovering with mild dyspnea. She was incidentally screened for hepatitis C and found to be positive.  [place] underwent surgical pancreatic resection on 7/217 at [facility] by Dr. Mina Dumont after Mother 76     colon cancer   • Other [OTHER] Mother      celiac sprue   • Clotting Disorder Sister 25     recurrent DVT's   • Bleeding Disorders Neg        Social History  Social History   Marital status:    Spouse name: N/A    Years of education: N/ negatives noted in the the HPI. Physical Exam:    Blood pressure (!) 89/73, pulse 85, temperature (!) 97.5 °F (36.4 °C), temperature source Oral, resp. rate 16, height 1.689 m (5' 6.5\"), weight 82.6 kg (182 lb).     General: Patient is alert and orient 1-21 of every cycle. Disp: 126 tablet Rfl: 5   docusate sodium 100 MG Oral Cap Take by mouth. Disp:  Rfl:    mirtazapine 15 MG Oral Tab Take 1 tablet (15 mg total) by mouth nightly.  Disp: 90 tablet Rfl: 1   AmLODIPine Besylate 5 MG Oral Tab Take 5 mg by mo examination  Impression:  82 yo F with resectable pancreatic cancer with local regional lymph node involvement [id number], stage IIB s/p cycle 1 of FOLFIRINOX on 4/24/17 with neulasta given on day 3 presents for follow up evaluation s/p [facility] cour gemcitabine in the adjuvant setting  --Discussed side effect profiles of both agents; capecitabine and gemcitabine in detail.  Patient has now consented to treatment at this time and is nervous about more chemotherapy, but is interested in receiving it base some high anxiety scores on visit screen assessments, but she is less anxious and more comfortable with this current chemotherapy regimen  --Currently stable    4.) Hepatitis C    --Patient was screened based on routine health maintenance recommendations f

## 2017-12-06 NOTE — PROGRESS NOTES
Port accessed using sterile technique. Labs collected per order. Port deaccessed, site covered w/ 2x2 and secured w/ tape. Appeared to tolerate well. Patient discharged to waiting area for MD hussein.

## 2017-12-07 ENCOUNTER — OFFICE VISIT (OUTPATIENT)
Dept: HEMATOLOGY/ONCOLOGY | Facility: HOSPITAL | Age: 72
End: 2017-12-07
Attending: INTERNAL MEDICINE
Payer: MEDICARE

## 2017-12-07 VITALS
TEMPERATURE: 98 F | DIASTOLIC BLOOD PRESSURE: 64 MMHG | HEART RATE: 92 BPM | RESPIRATION RATE: 16 BRPM | SYSTOLIC BLOOD PRESSURE: 134 MMHG

## 2017-12-07 DIAGNOSIS — C25.1 MALIGNANT NEOPLASM OF BODY OF PANCREAS (HCC): Primary | ICD-10-CM

## 2017-12-07 DIAGNOSIS — Z51.11 CHEMOTHERAPY MANAGEMENT, ENCOUNTER FOR: ICD-10-CM

## 2017-12-07 PROCEDURE — 96413 CHEMO IV INFUSION 1 HR: CPT

## 2017-12-07 PROCEDURE — 96361 HYDRATE IV INFUSION ADD-ON: CPT

## 2017-12-07 PROCEDURE — 96375 TX/PRO/DX INJ NEW DRUG ADDON: CPT

## 2017-12-07 RX ORDER — HEPARIN SODIUM (PORCINE) LOCK FLUSH IV SOLN 100 UNIT/ML 100 UNIT/ML
5 SOLUTION INTRAVENOUS ONCE
Status: COMPLETED | OUTPATIENT
Start: 2017-12-07 | End: 2017-12-07

## 2017-12-07 RX ORDER — SODIUM CHLORIDE 9 MG/ML
INJECTION, SOLUTION INTRAVENOUS
Status: DISCONTINUED
Start: 2017-12-07 | End: 2017-12-07 | Stop reason: WASHOUT

## 2017-12-07 RX ORDER — DEXTROSE AND SODIUM CHLORIDE 5; .9 G/100ML; G/100ML
INJECTION, SOLUTION INTRAVENOUS
Status: COMPLETED
Start: 2017-12-07 | End: 2017-12-07

## 2017-12-07 RX ORDER — HEPARIN SODIUM (PORCINE) LOCK FLUSH IV SOLN 100 UNIT/ML 100 UNIT/ML
SOLUTION INTRAVENOUS
Status: COMPLETED
Start: 2017-12-07 | End: 2017-12-07

## 2017-12-07 RX ORDER — HEPARIN SODIUM (PORCINE) LOCK FLUSH IV SOLN 100 UNIT/ML 100 UNIT/ML
5 SOLUTION INTRAVENOUS ONCE
Status: CANCELLED | OUTPATIENT
Start: 2017-12-07

## 2017-12-07 RX ORDER — 0.9 % SODIUM CHLORIDE 0.9 %
10 VIAL (ML) INJECTION ONCE
Status: CANCELLED | OUTPATIENT
Start: 2017-12-07

## 2017-12-07 RX ORDER — 0.9 % SODIUM CHLORIDE 0.9 %
VIAL (ML) INJECTION
Status: DISCONTINUED
Start: 2017-12-07 | End: 2017-12-07

## 2017-12-07 RX ADMIN — DEXTROSE AND SODIUM CHLORIDE 500 ML: 5; .9 INJECTION, SOLUTION INTRAVENOUS at 10:30:00

## 2017-12-07 RX ADMIN — HEPARIN SODIUM (PORCINE) LOCK FLUSH IV SOLN 100 UNIT/ML 500 UNITS: 100 SOLUTION INTRAVENOUS at 11:50:00

## 2017-12-07 NOTE — PROGRESS NOTES
Post Chemo Documentation    Pt here for C4D1 Gemzar w/ capcitebine. Arrives Ambulating independently       Verbalizes c/o mild fatigue for a few days after treatment; denies n/v/d/c/f/c. States she is happy that she is tolerating regimen well.   States th deficit  nutrition    Problems related to:    chemotherapy    Interventions:  emotional support given  maintain safe environment  monitor for signs/symptoms of infection  optimize nutrition status  patient/family supported  chemotherapy teaching    Expecte

## 2017-12-14 ENCOUNTER — TELEPHONE (OUTPATIENT)
Dept: HEMATOLOGY/ONCOLOGY | Facility: HOSPITAL | Age: 72
End: 2017-12-14

## 2017-12-14 ENCOUNTER — OFFICE VISIT (OUTPATIENT)
Dept: HEMATOLOGY/ONCOLOGY | Facility: HOSPITAL | Age: 72
End: 2017-12-14
Attending: INTERNAL MEDICINE
Payer: MEDICARE

## 2017-12-14 VITALS
TEMPERATURE: 97 F | RESPIRATION RATE: 16 BRPM | HEART RATE: 76 BPM | SYSTOLIC BLOOD PRESSURE: 123 MMHG | DIASTOLIC BLOOD PRESSURE: 68 MMHG

## 2017-12-14 DIAGNOSIS — Z51.11 CHEMOTHERAPY MANAGEMENT, ENCOUNTER FOR: Primary | ICD-10-CM

## 2017-12-14 DIAGNOSIS — C25.1 MALIGNANT NEOPLASM OF BODY OF PANCREAS (HCC): ICD-10-CM

## 2017-12-14 DIAGNOSIS — C25.1 MALIGNANT NEOPLASM OF BODY OF PANCREAS (HCC): Primary | ICD-10-CM

## 2017-12-14 PROCEDURE — 96367 TX/PROPH/DG ADDL SEQ IV INF: CPT

## 2017-12-14 PROCEDURE — 85025 COMPLETE CBC W/AUTO DIFF WBC: CPT

## 2017-12-14 PROCEDURE — 96413 CHEMO IV INFUSION 1 HR: CPT

## 2017-12-14 RX ORDER — 0.9 % SODIUM CHLORIDE 0.9 %
VIAL (ML) INJECTION
Status: DISCONTINUED
Start: 2017-12-14 | End: 2017-12-14

## 2017-12-14 RX ORDER — HEPARIN SODIUM (PORCINE) LOCK FLUSH IV SOLN 100 UNIT/ML 100 UNIT/ML
5 SOLUTION INTRAVENOUS ONCE
Status: COMPLETED | OUTPATIENT
Start: 2017-12-14 | End: 2017-12-14

## 2017-12-14 RX ORDER — 0.9 % SODIUM CHLORIDE 0.9 %
10 VIAL (ML) INJECTION ONCE
Status: CANCELLED | OUTPATIENT
Start: 2017-12-14

## 2017-12-14 RX ORDER — SODIUM CHLORIDE 9 MG/ML
INJECTION, SOLUTION INTRAVENOUS
Status: DISCONTINUED
Start: 2017-12-14 | End: 2017-12-14

## 2017-12-14 RX ORDER — HEPARIN SODIUM (PORCINE) LOCK FLUSH IV SOLN 100 UNIT/ML 100 UNIT/ML
SOLUTION INTRAVENOUS
Status: COMPLETED
Start: 2017-12-14 | End: 2017-12-14

## 2017-12-14 RX ORDER — HEPARIN SODIUM (PORCINE) LOCK FLUSH IV SOLN 100 UNIT/ML 100 UNIT/ML
5 SOLUTION INTRAVENOUS ONCE
Status: CANCELLED | OUTPATIENT
Start: 2017-12-14

## 2017-12-14 RX ORDER — 0.9 % SODIUM CHLORIDE 0.9 %
10 VIAL (ML) INJECTION ONCE
Status: DISCONTINUED | OUTPATIENT
Start: 2017-12-14 | End: 2017-12-14

## 2017-12-14 RX ADMIN — HEPARIN SODIUM (PORCINE) LOCK FLUSH IV SOLN 100 UNIT/ML 500 UNITS: 100 SOLUTION INTRAVENOUS at 13:55:00

## 2017-12-14 NOTE — PROGRESS NOTES
Post Chemo Documentation    Patient is here for treatment today, Cycle 4 Day8  Gemzar.       Arrives Ambulating independently, gait steady      Accompanied by patient came alone                                 Modifications in dose or schedule: Yes patient Reinforcement of education is needed at next visit?  Yes  Discharge instructions:  Call if you have questions    Reviewed symptoms to report to doctor and phone number to call [phone]  Discharged to Home     Discharged Ambulating independently

## 2017-12-14 NOTE — PATIENT INSTRUCTIONS
Recommended anti-nausea medications (as directed by your provider):   Prochloperazine (Compazine) 10mg every 8 hours  Take as needed and Ondansetron (Zofran) 8 mg every 8 hours  Take as needed    When to call the doctor:  • Fever of 100.5 or greater or sh bottle wherever you go. Any fluid is acceptable, but caffeinated products do not count towards your intake and should be limited to 1-2 drinks/day.     Physical Activity  o If your doctor approves, be as physically active as you can, but start out slowly, with, hug and kiss the children in your life. 3. You can be around pregnant women, though (if possible) they should not clean up any of your body fluids after you have treatment. 4. Sexual activity is safe while throughout treatment.   It is possible t refrigerator or away from light. Store the medicine container inside another container or in a sealed bag. Make sure it does not touch any food. 4. The medication should remain in its original packaging until it is ready for ingestion.   Pill container

## 2017-12-14 NOTE — PROGRESS NOTES
Pt here for pre-chemo labs then chemo infusion appt. She reports feeling well overall. She places emla cream to area, had seran wrap on top. Removed when ready for access, area numbed sufficiently per pt. Port accessed per sterile technique.   Easy

## 2017-12-21 ENCOUNTER — NURSE ONLY (OUTPATIENT)
Dept: HEMATOLOGY/ONCOLOGY | Facility: HOSPITAL | Age: 72
End: 2017-12-21
Attending: INTERNAL MEDICINE
Payer: MEDICARE

## 2017-12-21 VITALS
DIASTOLIC BLOOD PRESSURE: 57 MMHG | SYSTOLIC BLOOD PRESSURE: 125 MMHG | RESPIRATION RATE: 16 BRPM | HEART RATE: 71 BPM | TEMPERATURE: 98 F

## 2017-12-21 DIAGNOSIS — C25.1 MALIGNANT NEOPLASM OF BODY OF PANCREAS (HCC): Primary | ICD-10-CM

## 2017-12-21 DIAGNOSIS — Z51.11 CHEMOTHERAPY MANAGEMENT, ENCOUNTER FOR: ICD-10-CM

## 2017-12-21 PROCEDURE — 96375 TX/PRO/DX INJ NEW DRUG ADDON: CPT

## 2017-12-21 PROCEDURE — 96413 CHEMO IV INFUSION 1 HR: CPT

## 2017-12-21 PROCEDURE — 85025 COMPLETE CBC W/AUTO DIFF WBC: CPT

## 2017-12-21 RX ORDER — HEPARIN SODIUM (PORCINE) LOCK FLUSH IV SOLN 100 UNIT/ML 100 UNIT/ML
5 SOLUTION INTRAVENOUS ONCE
Status: COMPLETED | OUTPATIENT
Start: 2017-12-21 | End: 2017-12-21

## 2017-12-21 RX ORDER — HEPARIN SODIUM (PORCINE) LOCK FLUSH IV SOLN 100 UNIT/ML 100 UNIT/ML
5 SOLUTION INTRAVENOUS ONCE
Status: CANCELLED | OUTPATIENT
Start: 2017-12-21

## 2017-12-21 RX ORDER — 0.9 % SODIUM CHLORIDE 0.9 %
10 VIAL (ML) INJECTION ONCE
Status: CANCELLED | OUTPATIENT
Start: 2017-12-21

## 2017-12-21 RX ORDER — 0.9 % SODIUM CHLORIDE 0.9 %
VIAL (ML) INJECTION
Status: DISCONTINUED
Start: 2017-12-21 | End: 2017-12-21

## 2017-12-21 RX ORDER — SODIUM CHLORIDE 9 MG/ML
INJECTION, SOLUTION INTRAVENOUS
Status: DISCONTINUED
Start: 2017-12-21 | End: 2017-12-21

## 2017-12-21 RX ORDER — HEPARIN SODIUM (PORCINE) LOCK FLUSH IV SOLN 100 UNIT/ML 100 UNIT/ML
SOLUTION INTRAVENOUS
Status: DISCONTINUED
Start: 2017-12-21 | End: 2017-12-21

## 2017-12-21 RX ADMIN — HEPARIN SODIUM (PORCINE) LOCK FLUSH IV SOLN 100 UNIT/ML 500 UNITS: 100 SOLUTION INTRAVENOUS at 13:15:00

## 2017-12-21 NOTE — PROGRESS NOTES
Post Chemo Documentation    Patient is here for treatment today, Cycle4, Day 15 Gemzar.       Arrives Ambulating independently                                 Modifications in dose or schedule: Yes; dose reduced due to decreased plt count      Verbalizes no

## 2017-12-21 NOTE — PROGRESS NOTES
Pt here for pre-chemo labs then chemo infusion appt. She reports feeling well overall. She usually spends [facility] [facility] - will stay with family for the holiday     She places emla cream to area, had seran wrap on top.   Removed when ready for access, are

## 2018-01-01 ENCOUNTER — NURSE ONLY (OUTPATIENT)
Dept: HEMATOLOGY/ONCOLOGY | Facility: HOSPITAL | Age: 73
End: 2018-01-01
Attending: INTERNAL MEDICINE
Payer: MEDICARE

## 2018-01-01 ENCOUNTER — TELEPHONE (OUTPATIENT)
Dept: HEMATOLOGY/ONCOLOGY | Facility: HOSPITAL | Age: 73
End: 2018-01-01

## 2018-01-01 ENCOUNTER — SNF/IP PROF CHARGE ONLY (OUTPATIENT)
Dept: HEMATOLOGY/ONCOLOGY | Facility: HOSPITAL | Age: 73
End: 2018-01-01

## 2018-01-01 ENCOUNTER — HOSPITAL ENCOUNTER (OUTPATIENT)
Dept: CT IMAGING | Facility: HOSPITAL | Age: 73
Discharge: HOME | End: 2018-01-01
Attending: INTERNAL MEDICINE
Payer: MEDICARE

## 2018-01-01 ENCOUNTER — APPOINTMENT (OUTPATIENT)
Dept: GENERAL RADIOLOGY | Facility: HOSPITAL | Age: 73
DRG: 808 | End: 2018-01-01
Attending: EMERGENCY MEDICINE
Payer: MEDICARE

## 2018-01-01 ENCOUNTER — OFFICE VISIT (OUTPATIENT)
Dept: HEMATOLOGY/ONCOLOGY | Facility: HOSPITAL | Age: 73
End: 2018-01-01
Attending: PHYSICIAN ASSISTANT
Payer: MEDICARE

## 2018-01-01 ENCOUNTER — PATIENT OUTREACH (OUTPATIENT)
Dept: HEMATOLOGY/ONCOLOGY | Facility: HOSPITAL | Age: 73
End: 2018-01-01

## 2018-01-01 ENCOUNTER — HOSPITAL ENCOUNTER (INPATIENT)
Facility: HOSPITAL | Age: 73
LOS: 3 days | Discharge: HOME | DRG: 808 | End: 2018-01-01
Attending: EMERGENCY MEDICINE | Admitting: INTERNAL MEDICINE
Payer: MEDICARE

## 2018-01-01 VITALS
RESPIRATION RATE: 18 BRPM | WEIGHT: 177 LBS | HEART RATE: 85 BPM | BODY MASS INDEX: 28.11 KG/M2 | TEMPERATURE: 98 F | SYSTOLIC BLOOD PRESSURE: 143 MMHG | DIASTOLIC BLOOD PRESSURE: 63 MMHG | HEIGHT: 66.5 IN

## 2018-01-01 VITALS
RESPIRATION RATE: 16 BRPM | SYSTOLIC BLOOD PRESSURE: 129 MMHG | HEART RATE: 67 BPM | TEMPERATURE: 98 F | DIASTOLIC BLOOD PRESSURE: 77 MMHG

## 2018-01-01 VITALS
SYSTOLIC BLOOD PRESSURE: 125 MMHG | HEART RATE: 67 BPM | DIASTOLIC BLOOD PRESSURE: 63 MMHG | RESPIRATION RATE: 18 BRPM | TEMPERATURE: 98 F

## 2018-01-01 VITALS
SYSTOLIC BLOOD PRESSURE: 134 MMHG | TEMPERATURE: 98 F | RESPIRATION RATE: 16 BRPM | DIASTOLIC BLOOD PRESSURE: 61 MMHG | HEART RATE: 91 BPM

## 2018-01-01 VITALS
DIASTOLIC BLOOD PRESSURE: 57 MMHG | WEIGHT: 179 LBS | HEART RATE: 102 BPM | SYSTOLIC BLOOD PRESSURE: 117 MMHG | RESPIRATION RATE: 18 BRPM | BODY MASS INDEX: 28.43 KG/M2 | HEIGHT: 66.5 IN

## 2018-01-01 VITALS
RESPIRATION RATE: 16 BRPM | DIASTOLIC BLOOD PRESSURE: 68 MMHG | HEART RATE: 73 BPM | SYSTOLIC BLOOD PRESSURE: 119 MMHG | TEMPERATURE: 98 F

## 2018-01-01 VITALS
WEIGHT: 187 LBS | RESPIRATION RATE: 16 BRPM | HEIGHT: 66.5 IN | HEIGHT: 66.5 IN | RESPIRATION RATE: 16 BRPM | WEIGHT: 190 LBS | HEART RATE: 77 BPM | SYSTOLIC BLOOD PRESSURE: 121 MMHG | BODY MASS INDEX: 30.17 KG/M2 | DIASTOLIC BLOOD PRESSURE: 65 MMHG | DIASTOLIC BLOOD PRESSURE: 52 MMHG | HEART RATE: 70 BPM | SYSTOLIC BLOOD PRESSURE: 116 MMHG | TEMPERATURE: 98 F | BODY MASS INDEX: 29.7 KG/M2 | TEMPERATURE: 98 F

## 2018-01-01 VITALS
WEIGHT: 182 LBS | TEMPERATURE: 98 F | DIASTOLIC BLOOD PRESSURE: 61 MMHG | BODY MASS INDEX: 28.9 KG/M2 | RESPIRATION RATE: 18 BRPM | HEIGHT: 66.5 IN | SYSTOLIC BLOOD PRESSURE: 113 MMHG | HEART RATE: 76 BPM

## 2018-01-01 VITALS
TEMPERATURE: 98 F | RESPIRATION RATE: 18 BRPM | SYSTOLIC BLOOD PRESSURE: 143 MMHG | DIASTOLIC BLOOD PRESSURE: 71 MMHG | BODY MASS INDEX: 29.54 KG/M2 | HEART RATE: 77 BPM | WEIGHT: 186 LBS | HEIGHT: 66.5 IN

## 2018-01-01 VITALS
HEART RATE: 69 BPM | DIASTOLIC BLOOD PRESSURE: 65 MMHG | BODY MASS INDEX: 30.02 KG/M2 | HEIGHT: 66.5 IN | TEMPERATURE: 98 F | SYSTOLIC BLOOD PRESSURE: 128 MMHG | RESPIRATION RATE: 18 BRPM | WEIGHT: 189 LBS

## 2018-01-01 VITALS
RESPIRATION RATE: 16 BRPM | SYSTOLIC BLOOD PRESSURE: 115 MMHG | HEART RATE: 75 BPM | DIASTOLIC BLOOD PRESSURE: 62 MMHG | TEMPERATURE: 97 F

## 2018-01-01 VITALS
DIASTOLIC BLOOD PRESSURE: 65 MMHG | TEMPERATURE: 98 F | HEART RATE: 91 BPM | RESPIRATION RATE: 16 BRPM | SYSTOLIC BLOOD PRESSURE: 138 MMHG

## 2018-01-01 VITALS
DIASTOLIC BLOOD PRESSURE: 70 MMHG | RESPIRATION RATE: 18 BRPM | TEMPERATURE: 98 F | HEART RATE: 72 BPM | SYSTOLIC BLOOD PRESSURE: 132 MMHG

## 2018-01-01 VITALS
TEMPERATURE: 98 F | RESPIRATION RATE: 18 BRPM | HEART RATE: 87 BPM | HEIGHT: 66.5 IN | WEIGHT: 187 LBS | DIASTOLIC BLOOD PRESSURE: 66 MMHG | BODY MASS INDEX: 29.7 KG/M2 | SYSTOLIC BLOOD PRESSURE: 144 MMHG

## 2018-01-01 VITALS
BODY MASS INDEX: 26.84 KG/M2 | RESPIRATION RATE: 18 BRPM | WEIGHT: 169 LBS | DIASTOLIC BLOOD PRESSURE: 75 MMHG | HEART RATE: 103 BPM | HEIGHT: 66.5 IN | TEMPERATURE: 98 F | SYSTOLIC BLOOD PRESSURE: 127 MMHG

## 2018-01-01 VITALS
TEMPERATURE: 98 F | RESPIRATION RATE: 18 BRPM | SYSTOLIC BLOOD PRESSURE: 127 MMHG | HEART RATE: 79 BPM | DIASTOLIC BLOOD PRESSURE: 63 MMHG

## 2018-01-01 VITALS
TEMPERATURE: 97 F | RESPIRATION RATE: 16 BRPM | DIASTOLIC BLOOD PRESSURE: 57 MMHG | SYSTOLIC BLOOD PRESSURE: 140 MMHG | HEART RATE: 80 BPM

## 2018-01-01 VITALS
TEMPERATURE: 98 F | RESPIRATION RATE: 16 BRPM | DIASTOLIC BLOOD PRESSURE: 61 MMHG | SYSTOLIC BLOOD PRESSURE: 130 MMHG | HEART RATE: 70 BPM

## 2018-01-01 VITALS
HEART RATE: 103 BPM | SYSTOLIC BLOOD PRESSURE: 127 MMHG | DIASTOLIC BLOOD PRESSURE: 75 MMHG | TEMPERATURE: 98 F | BODY MASS INDEX: 26.84 KG/M2 | HEIGHT: 66.5 IN | WEIGHT: 169 LBS

## 2018-01-01 VITALS
HEART RATE: 70 BPM | SYSTOLIC BLOOD PRESSURE: 117 MMHG | DIASTOLIC BLOOD PRESSURE: 54 MMHG | TEMPERATURE: 98 F | RESPIRATION RATE: 18 BRPM

## 2018-01-01 VITALS
RESPIRATION RATE: 16 BRPM | SYSTOLIC BLOOD PRESSURE: 120 MMHG | HEART RATE: 71 BPM | DIASTOLIC BLOOD PRESSURE: 67 MMHG | TEMPERATURE: 98 F

## 2018-01-01 VITALS
DIASTOLIC BLOOD PRESSURE: 55 MMHG | SYSTOLIC BLOOD PRESSURE: 130 MMHG | RESPIRATION RATE: 16 BRPM | TEMPERATURE: 98 F | HEART RATE: 73 BPM

## 2018-01-01 VITALS
HEIGHT: 66.5 IN | TEMPERATURE: 98 F | HEART RATE: 96 BPM | HEIGHT: 66.5 IN | WEIGHT: 185 LBS | BODY MASS INDEX: 29.38 KG/M2 | WEIGHT: 185 LBS | SYSTOLIC BLOOD PRESSURE: 126 MMHG | HEART RATE: 73 BPM | RESPIRATION RATE: 16 BRPM | BODY MASS INDEX: 29.38 KG/M2 | SYSTOLIC BLOOD PRESSURE: 96 MMHG | DIASTOLIC BLOOD PRESSURE: 69 MMHG | RESPIRATION RATE: 18 BRPM | DIASTOLIC BLOOD PRESSURE: 62 MMHG | TEMPERATURE: 98 F

## 2018-01-01 VITALS
BODY MASS INDEX: 30.17 KG/M2 | SYSTOLIC BLOOD PRESSURE: 134 MMHG | TEMPERATURE: 98 F | WEIGHT: 190 LBS | HEIGHT: 66.5 IN | HEART RATE: 91 BPM | DIASTOLIC BLOOD PRESSURE: 61 MMHG | RESPIRATION RATE: 16 BRPM

## 2018-01-01 VITALS
DIASTOLIC BLOOD PRESSURE: 61 MMHG | WEIGHT: 179 LBS | HEART RATE: 74 BPM | RESPIRATION RATE: 18 BRPM | TEMPERATURE: 98 F | HEIGHT: 66.5 IN | SYSTOLIC BLOOD PRESSURE: 122 MMHG | BODY MASS INDEX: 28.43 KG/M2

## 2018-01-01 VITALS
RESPIRATION RATE: 16 BRPM | SYSTOLIC BLOOD PRESSURE: 120 MMHG | DIASTOLIC BLOOD PRESSURE: 58 MMHG | HEART RATE: 70 BPM | TEMPERATURE: 98 F

## 2018-01-01 VITALS
TEMPERATURE: 97 F | DIASTOLIC BLOOD PRESSURE: 65 MMHG | HEART RATE: 80 BPM | SYSTOLIC BLOOD PRESSURE: 133 MMHG | RESPIRATION RATE: 16 BRPM

## 2018-01-01 VITALS
RESPIRATION RATE: 18 BRPM | BODY MASS INDEX: 28.77 KG/M2 | HEIGHT: 66 IN | TEMPERATURE: 98 F | HEART RATE: 73 BPM | SYSTOLIC BLOOD PRESSURE: 124 MMHG | WEIGHT: 179 LBS | OXYGEN SATURATION: 97 % | DIASTOLIC BLOOD PRESSURE: 59 MMHG

## 2018-01-01 VITALS
TEMPERATURE: 98 F | HEIGHT: 66.5 IN | RESPIRATION RATE: 16 BRPM | BODY MASS INDEX: 29.06 KG/M2 | DIASTOLIC BLOOD PRESSURE: 70 MMHG | SYSTOLIC BLOOD PRESSURE: 128 MMHG | WEIGHT: 183 LBS | HEART RATE: 83 BPM

## 2018-01-01 VITALS — DIASTOLIC BLOOD PRESSURE: 52 MMHG | HEART RATE: 79 BPM | SYSTOLIC BLOOD PRESSURE: 112 MMHG | TEMPERATURE: 100 F

## 2018-01-01 VITALS
RESPIRATION RATE: 16 BRPM | DIASTOLIC BLOOD PRESSURE: 75 MMHG | HEART RATE: 99 BPM | SYSTOLIC BLOOD PRESSURE: 136 MMHG | TEMPERATURE: 98 F

## 2018-01-01 VITALS
RESPIRATION RATE: 18 BRPM | SYSTOLIC BLOOD PRESSURE: 133 MMHG | HEART RATE: 74 BPM | TEMPERATURE: 98 F | DIASTOLIC BLOOD PRESSURE: 66 MMHG

## 2018-01-01 VITALS
BODY MASS INDEX: 29.54 KG/M2 | TEMPERATURE: 98 F | HEIGHT: 66.5 IN | SYSTOLIC BLOOD PRESSURE: 144 MMHG | DIASTOLIC BLOOD PRESSURE: 69 MMHG | HEART RATE: 78 BPM | WEIGHT: 186 LBS | RESPIRATION RATE: 18 BRPM

## 2018-01-01 VITALS
HEART RATE: 84 BPM | RESPIRATION RATE: 18 BRPM | DIASTOLIC BLOOD PRESSURE: 55 MMHG | TEMPERATURE: 97 F | SYSTOLIC BLOOD PRESSURE: 131 MMHG

## 2018-01-01 VITALS
BODY MASS INDEX: 26.84 KG/M2 | SYSTOLIC BLOOD PRESSURE: 139 MMHG | RESPIRATION RATE: 18 BRPM | TEMPERATURE: 98 F | HEIGHT: 66.5 IN | DIASTOLIC BLOOD PRESSURE: 70 MMHG | HEART RATE: 94 BPM | WEIGHT: 169 LBS

## 2018-01-01 VITALS
HEART RATE: 67 BPM | TEMPERATURE: 98 F | SYSTOLIC BLOOD PRESSURE: 130 MMHG | WEIGHT: 186 LBS | DIASTOLIC BLOOD PRESSURE: 54 MMHG | BODY MASS INDEX: 30 KG/M2 | RESPIRATION RATE: 18 BRPM

## 2018-01-01 VITALS
DIASTOLIC BLOOD PRESSURE: 72 MMHG | TEMPERATURE: 97 F | RESPIRATION RATE: 18 BRPM | HEART RATE: 96 BPM | SYSTOLIC BLOOD PRESSURE: 138 MMHG

## 2018-01-01 VITALS
DIASTOLIC BLOOD PRESSURE: 66 MMHG | SYSTOLIC BLOOD PRESSURE: 132 MMHG | RESPIRATION RATE: 18 BRPM | HEART RATE: 88 BPM | TEMPERATURE: 98 F

## 2018-01-01 VITALS
SYSTOLIC BLOOD PRESSURE: 110 MMHG | HEART RATE: 87 BPM | RESPIRATION RATE: 18 BRPM | TEMPERATURE: 97 F | DIASTOLIC BLOOD PRESSURE: 60 MMHG

## 2018-01-01 DIAGNOSIS — Z51.11 CHEMOTHERAPY MANAGEMENT, ENCOUNTER FOR: ICD-10-CM

## 2018-01-01 DIAGNOSIS — C78.6 OMENTAL METASTASIS (HCC): ICD-10-CM

## 2018-01-01 DIAGNOSIS — C25.1 MALIGNANT NEOPLASM OF BODY OF PANCREAS (HCC): Primary | ICD-10-CM

## 2018-01-01 DIAGNOSIS — R63.8 DECREASED ORAL INTAKE: ICD-10-CM

## 2018-01-01 DIAGNOSIS — C25.1 MALIGNANT NEOPLASM OF BODY OF PANCREAS (HCC): ICD-10-CM

## 2018-01-01 DIAGNOSIS — I81 PORTAL VEIN THROMBOSIS: ICD-10-CM

## 2018-01-01 DIAGNOSIS — Z71.89 GOALS OF CARE, COUNSELING/DISCUSSION: ICD-10-CM

## 2018-01-01 DIAGNOSIS — G62.0 CHEMOTHERAPY-INDUCED NEUROPATHY (HCC): ICD-10-CM

## 2018-01-01 DIAGNOSIS — Z08 ENCOUNTER FOR FOLLOW-UP SURVEILLANCE OF PANCREATIC CANCER: ICD-10-CM

## 2018-01-01 DIAGNOSIS — D70.9 NEUTROPENIA, UNSPECIFIED TYPE (HCC): Primary | ICD-10-CM

## 2018-01-01 DIAGNOSIS — I48.3 TYPICAL ATRIAL FLUTTER (HCC): ICD-10-CM

## 2018-01-01 DIAGNOSIS — R19.7 DIARRHEA, UNSPECIFIED TYPE: ICD-10-CM

## 2018-01-01 DIAGNOSIS — K59.00 CONSTIPATION, UNSPECIFIED CONSTIPATION TYPE: ICD-10-CM

## 2018-01-01 DIAGNOSIS — Z51.11 CHEMOTHERAPY MANAGEMENT, ENCOUNTER FOR: Primary | ICD-10-CM

## 2018-01-01 DIAGNOSIS — B37.0 THRUSH: ICD-10-CM

## 2018-01-01 DIAGNOSIS — G89.3 CANCER-RELATED PAIN: ICD-10-CM

## 2018-01-01 DIAGNOSIS — D70.1 CHEMOTHERAPY-INDUCED NEUTROPENIA (HCC): ICD-10-CM

## 2018-01-01 DIAGNOSIS — C78.00 MALIGNANT NEOPLASM METASTATIC TO LUNG, UNSPECIFIED LATERALITY (HCC): ICD-10-CM

## 2018-01-01 DIAGNOSIS — T45.1X5A CHEMOTHERAPY-INDUCED NEUTROPENIA (HCC): ICD-10-CM

## 2018-01-01 DIAGNOSIS — R19.7 DIARRHEA, UNSPECIFIED TYPE: Primary | ICD-10-CM

## 2018-01-01 DIAGNOSIS — I26.99 OTHER ACUTE PULMONARY EMBOLISM WITHOUT ACUTE COR PULMONALE (HCC): ICD-10-CM

## 2018-01-01 DIAGNOSIS — B37.0 THRUSH, ORAL: ICD-10-CM

## 2018-01-01 DIAGNOSIS — K13.79 MOUTH PAIN: ICD-10-CM

## 2018-01-01 DIAGNOSIS — R53.1 WEAKNESS GENERALIZED: ICD-10-CM

## 2018-01-01 DIAGNOSIS — E87.6 HYPOKALEMIA: ICD-10-CM

## 2018-01-01 DIAGNOSIS — C78.00 MALIGNANT NEOPLASM METASTATIC TO LUNG, UNSPECIFIED LATERALITY (HCC): Primary | ICD-10-CM

## 2018-01-01 DIAGNOSIS — T45.1X5A CHEMOTHERAPY-INDUCED NEUROPATHY (HCC): Primary | ICD-10-CM

## 2018-01-01 DIAGNOSIS — F51.01 PRIMARY INSOMNIA: ICD-10-CM

## 2018-01-01 DIAGNOSIS — T45.1X5A CHEMOTHERAPY-INDUCED NEUROPATHY (HCC): ICD-10-CM

## 2018-01-01 DIAGNOSIS — R63.0 ANOREXIA: ICD-10-CM

## 2018-01-01 DIAGNOSIS — G62.0 CHEMOTHERAPY-INDUCED NEUROPATHY (HCC): Primary | ICD-10-CM

## 2018-01-01 DIAGNOSIS — Z85.07 ENCOUNTER FOR FOLLOW-UP SURVEILLANCE OF PANCREATIC CANCER: ICD-10-CM

## 2018-01-01 DIAGNOSIS — Z71.89 ADVANCE CARE PLANNING: ICD-10-CM

## 2018-01-01 DIAGNOSIS — K12.1 STOMATITIS: ICD-10-CM

## 2018-01-01 LAB
ALBUMIN SERPL BCP-MCNC: 3.5 G/DL (ref 3.5–4.8)
ALBUMIN SERPL BCP-MCNC: 3.5 G/DL (ref 3.5–4.8)
ALBUMIN SERPL BCP-MCNC: 3.6 G/DL (ref 3.5–4.8)
ALBUMIN SERPL BCP-MCNC: 3.7 G/DL (ref 3.5–4.8)
ALBUMIN SERPL BCP-MCNC: 3.7 G/DL (ref 3.5–4.8)
ALBUMIN SERPL BCP-MCNC: 3.8 G/DL (ref 3.5–4.8)
ALBUMIN SERPL BCP-MCNC: 3.9 G/DL (ref 3.5–4.8)
ALBUMIN/GLOB SERPL: 1.4 {RATIO} (ref 1–2)
ALBUMIN/GLOB SERPL: 1.4 {RATIO} (ref 1–2)
ALBUMIN/GLOB SERPL: 1.5 {RATIO} (ref 1–2)
ALBUMIN/GLOB SERPL: 1.5 {RATIO} (ref 1–2)
ALBUMIN/GLOB SERPL: 1.6 {RATIO} (ref 1–2)
ALBUMIN/GLOB SERPL: 1.7 {RATIO} (ref 1–2)
ALBUMIN/GLOB SERPL: 1.7 {RATIO} (ref 1–2)
ALP SERPL-CCNC: 61 U/L (ref 32–100)
ALP SERPL-CCNC: 61 U/L (ref 32–100)
ALP SERPL-CCNC: 66 U/L (ref 32–100)
ALP SERPL-CCNC: 70 U/L (ref 32–100)
ALP SERPL-CCNC: 71 U/L (ref 32–100)
ALP SERPL-CCNC: 75 U/L (ref 32–100)
ALP SERPL-CCNC: 81 U/L (ref 32–100)
ALT SERPL-CCNC: 14 U/L (ref 14–54)
ALT SERPL-CCNC: 15 U/L (ref 14–54)
ALT SERPL-CCNC: 16 U/L (ref 14–54)
ALT SERPL-CCNC: 17 U/L (ref 14–54)
ALT SERPL-CCNC: 18 U/L (ref 14–54)
ALT SERPL-CCNC: 22 U/L (ref 14–54)
ALT SERPL-CCNC: 25 U/L (ref 14–54)
ANION GAP SERPL CALC-SCNC: 7 MMOL/L (ref 0–18)
ANION GAP SERPL CALC-SCNC: 8 MMOL/L (ref 0–18)
ANION GAP SERPL CALC-SCNC: 9 MMOL/L (ref 0–18)
AST SERPL-CCNC: 20 U/L (ref 15–41)
AST SERPL-CCNC: 23 U/L (ref 15–41)
AST SERPL-CCNC: 26 U/L (ref 15–41)
AST SERPL-CCNC: 26 U/L (ref 15–41)
AST SERPL-CCNC: 31 U/L (ref 15–41)
AST SERPL-CCNC: 36 U/L (ref 15–41)
AST SERPL-CCNC: 36 U/L (ref 15–41)
BASOPHILS # BLD: 0 K/UL (ref 0–0.2)
BASOPHILS # BLD: 0.1 K/UL (ref 0–0.2)
BASOPHILS # BLD: 0.2 K/UL (ref 0–0.2)
BASOPHILS NFR BLD: 0 %
BASOPHILS NFR BLD: 0 %
BASOPHILS NFR BLD: 1 %
BASOPHILS NFR BLD: 2 %
BASOPHILS NFR BLD: 2 %
BASOPHILS NFR BLD: 3 %
BILIRUB SERPL-MCNC: 0.3 MG/DL (ref 0.3–1.2)
BILIRUB SERPL-MCNC: 0.3 MG/DL (ref 0.3–1.2)
BILIRUB SERPL-MCNC: 0.4 MG/DL (ref 0.3–1.2)
BILIRUB SERPL-MCNC: 0.7 MG/DL (ref 0.3–1.2)
BUN SERPL-MCNC: 10 MG/DL (ref 8–20)
BUN SERPL-MCNC: 10 MG/DL (ref 8–20)
BUN SERPL-MCNC: 11 MG/DL (ref 8–20)
BUN SERPL-MCNC: 12 MG/DL (ref 8–20)
BUN SERPL-MCNC: 13 MG/DL (ref 8–20)
BUN SERPL-MCNC: 14 MG/DL (ref 8–20)
BUN SERPL-MCNC: 16 MG/DL (ref 8–20)
BUN/CREAT SERPL: 13 (ref 10–20)
BUN/CREAT SERPL: 13.2 (ref 10–20)
BUN/CREAT SERPL: 14.9 (ref 10–20)
BUN/CREAT SERPL: 15.7 (ref 10–20)
BUN/CREAT SERPL: 16 (ref 10–20)
BUN/CREAT SERPL: 16.1 (ref 10–20)
BUN/CREAT SERPL: 20.5 (ref 10–20)
CALCIUM SERPL-MCNC: 8.8 MG/DL (ref 8.5–10.5)
CALCIUM SERPL-MCNC: 8.9 MG/DL (ref 8.5–10.5)
CALCIUM SERPL-MCNC: 9 MG/DL (ref 8.5–10.5)
CALCIUM SERPL-MCNC: 9 MG/DL (ref 8.5–10.5)
CALCIUM SERPL-MCNC: 9.1 MG/DL (ref 8.5–10.5)
CALCIUM SERPL-MCNC: 9.2 MG/DL (ref 8.5–10.5)
CALCIUM SERPL-MCNC: 9.3 MG/DL (ref 8.5–10.5)
CANCER AG19-9 SERPL-ACNC: 1039 U/ML (ref 0–35)
CANCER AG19-9 SERPL-ACNC: 1283 U/ML (ref 0–35)
CANCER AG19-9 SERPL-ACNC: 1928 U/ML (ref 0–35)
CANCER AG19-9 SERPL-ACNC: 898 U/ML (ref 0–35)
CHLORIDE SERPL-SCNC: 104 MMOL/L (ref 95–110)
CHLORIDE SERPL-SCNC: 105 MMOL/L (ref 95–110)
CHLORIDE SERPL-SCNC: 106 MMOL/L (ref 95–110)
CHLORIDE SERPL-SCNC: 106 MMOL/L (ref 95–110)
CHLORIDE SERPL-SCNC: 109 MMOL/L (ref 95–110)
CO2 SERPL-SCNC: 24 MMOL/L (ref 22–32)
CO2 SERPL-SCNC: 24 MMOL/L (ref 22–32)
CO2 SERPL-SCNC: 25 MMOL/L (ref 22–32)
CO2 SERPL-SCNC: 26 MMOL/L (ref 22–32)
CREAT BLD-MCNC: 0.7 MG/DL (ref 0.5–1.5)
CREAT SERPL-MCNC: 0.74 MG/DL (ref 0.5–1.5)
CREAT SERPL-MCNC: 0.75 MG/DL (ref 0.5–1.5)
CREAT SERPL-MCNC: 0.76 MG/DL (ref 0.5–1.5)
CREAT SERPL-MCNC: 0.77 MG/DL (ref 0.5–1.5)
CREAT SERPL-MCNC: 0.78 MG/DL (ref 0.5–1.5)
CREAT SERPL-MCNC: 0.83 MG/DL (ref 0.5–1.5)
CREAT SERPL-MCNC: 0.87 MG/DL (ref 0.5–1.5)
EOSINOPHIL # BLD: 0 K/UL (ref 0–0.7)
EOSINOPHIL # BLD: 0.1 K/UL (ref 0–0.7)
EOSINOPHIL # BLD: 0.2 K/UL (ref 0–0.7)
EOSINOPHIL # BLD: 0.3 K/UL (ref 0–0.7)
EOSINOPHIL # BLD: 0.3 K/UL (ref 0–0.7)
EOSINOPHIL NFR BLD: 0 %
EOSINOPHIL NFR BLD: 0 %
EOSINOPHIL NFR BLD: 1 %
EOSINOPHIL NFR BLD: 3 %
EOSINOPHIL NFR BLD: 3 %
EOSINOPHIL NFR BLD: 4 %
ERYTHROCYTE [DISTWIDTH] IN BLOOD BY AUTOMATED COUNT: 18.7 % (ref 11–15)
ERYTHROCYTE [DISTWIDTH] IN BLOOD BY AUTOMATED COUNT: 18.9 % (ref 11–15)
ERYTHROCYTE [DISTWIDTH] IN BLOOD BY AUTOMATED COUNT: 20.1 % (ref 11–15)
ERYTHROCYTE [DISTWIDTH] IN BLOOD BY AUTOMATED COUNT: 20.1 % (ref 11–15)
ERYTHROCYTE [DISTWIDTH] IN BLOOD BY AUTOMATED COUNT: 20.5 % (ref 11–15)
ERYTHROCYTE [DISTWIDTH] IN BLOOD BY AUTOMATED COUNT: 20.8 % (ref 11–15)
ERYTHROCYTE [DISTWIDTH] IN BLOOD BY AUTOMATED COUNT: 21.5 % (ref 11–15)
ERYTHROCYTE [DISTWIDTH] IN BLOOD BY AUTOMATED COUNT: 21.5 % (ref 11–15)
ERYTHROCYTE [DISTWIDTH] IN BLOOD BY AUTOMATED COUNT: 22.2 % (ref 11–15)
GLOBULIN PLAS-MCNC: 2.1 G/DL (ref 2.5–3.7)
GLOBULIN PLAS-MCNC: 2.2 G/DL (ref 2.5–3.7)
GLOBULIN PLAS-MCNC: 2.4 G/DL (ref 2.5–3.7)
GLOBULIN PLAS-MCNC: 2.5 G/DL (ref 2.5–3.7)
GLOBULIN PLAS-MCNC: 2.6 G/DL (ref 2.5–3.7)
GLUCOSE SERPL-MCNC: 114 MG/DL (ref 70–99)
GLUCOSE SERPL-MCNC: 123 MG/DL (ref 70–99)
GLUCOSE SERPL-MCNC: 134 MG/DL (ref 70–99)
GLUCOSE SERPL-MCNC: 135 MG/DL (ref 70–99)
GLUCOSE SERPL-MCNC: 138 MG/DL (ref 70–99)
GLUCOSE SERPL-MCNC: 138 MG/DL (ref 70–99)
GLUCOSE SERPL-MCNC: 158 MG/DL (ref 70–99)
HCT VFR BLD AUTO: 32.7 % (ref 35–48)
HCT VFR BLD AUTO: 34.6 % (ref 35–48)
HCT VFR BLD AUTO: 35.3 % (ref 35–48)
HCT VFR BLD AUTO: 35.9 % (ref 35–48)
HCT VFR BLD AUTO: 36.3 % (ref 35–48)
HCT VFR BLD AUTO: 36.7 % (ref 35–48)
HCT VFR BLD AUTO: 36.8 % (ref 35–48)
HCT VFR BLD AUTO: 39 % (ref 35–48)
HCT VFR BLD AUTO: 39.8 % (ref 35–48)
HGB BLD-MCNC: 10.5 G/DL (ref 12–16)
HGB BLD-MCNC: 11.3 G/DL (ref 12–16)
HGB BLD-MCNC: 11.3 G/DL (ref 12–16)
HGB BLD-MCNC: 11.6 G/DL (ref 12–16)
HGB BLD-MCNC: 11.7 G/DL (ref 12–16)
HGB BLD-MCNC: 11.8 G/DL (ref 12–16)
HGB BLD-MCNC: 11.9 G/DL (ref 12–16)
HGB BLD-MCNC: 12.7 G/DL (ref 12–16)
HGB BLD-MCNC: 12.8 G/DL (ref 12–16)
LYMPHOCYTES # BLD: 2.7 K/UL (ref 1–4)
LYMPHOCYTES # BLD: 3.1 K/UL (ref 1–4)
LYMPHOCYTES # BLD: 3.1 K/UL (ref 1–4)
LYMPHOCYTES # BLD: 3.3 K/UL (ref 1–4)
LYMPHOCYTES # BLD: 3.8 K/UL (ref 1–4)
LYMPHOCYTES # BLD: 4.2 K/UL (ref 1–4)
LYMPHOCYTES # BLD: 4.7 K/UL (ref 1–4)
LYMPHOCYTES # BLD: 5.1 K/UL (ref 1–4)
LYMPHOCYTES # BLD: 5.4 K/UL (ref 1–4)
LYMPHOCYTES NFR BLD: 33 %
LYMPHOCYTES NFR BLD: 38 %
LYMPHOCYTES NFR BLD: 38 %
LYMPHOCYTES NFR BLD: 43 %
LYMPHOCYTES NFR BLD: 52 %
LYMPHOCYTES NFR BLD: 55 %
LYMPHOCYTES NFR BLD: 55 %
LYMPHOCYTES NFR BLD: 61 %
LYMPHOCYTES NFR BLD: 74 %
MCH RBC QN AUTO: 27.3 PG (ref 27–32)
MCH RBC QN AUTO: 27.6 PG (ref 27–32)
MCH RBC QN AUTO: 27.7 PG (ref 27–32)
MCH RBC QN AUTO: 27.9 PG (ref 27–32)
MCH RBC QN AUTO: 28 PG (ref 27–32)
MCH RBC QN AUTO: 28 PG (ref 27–32)
MCH RBC QN AUTO: 29.2 PG (ref 27–32)
MCH RBC QN AUTO: 29.3 PG (ref 27–32)
MCH RBC QN AUTO: 29.4 PG (ref 27–32)
MCHC RBC AUTO-ENTMCNC: 31.6 G/DL (ref 32–37)
MCHC RBC AUTO-ENTMCNC: 31.7 G/DL (ref 32–37)
MCHC RBC AUTO-ENTMCNC: 31.9 G/DL (ref 32–37)
MCHC RBC AUTO-ENTMCNC: 32 G/DL (ref 32–37)
MCHC RBC AUTO-ENTMCNC: 32.2 G/DL (ref 32–37)
MCHC RBC AUTO-ENTMCNC: 32.5 G/DL (ref 32–37)
MCHC RBC AUTO-ENTMCNC: 32.6 G/DL (ref 32–37)
MCHC RBC AUTO-ENTMCNC: 32.6 G/DL (ref 32–37)
MCHC RBC AUTO-ENTMCNC: 32.8 G/DL (ref 32–37)
MCV RBC AUTO: 84.8 FL (ref 80–100)
MCV RBC AUTO: 85.3 FL (ref 80–100)
MCV RBC AUTO: 85.9 FL (ref 80–100)
MCV RBC AUTO: 87.2 FL (ref 80–100)
MCV RBC AUTO: 87.4 FL (ref 80–100)
MCV RBC AUTO: 87.6 FL (ref 80–100)
MCV RBC AUTO: 89.5 FL (ref 80–100)
MCV RBC AUTO: 89.8 FL (ref 80–100)
MCV RBC AUTO: 92 FL (ref 80–100)
METAMYELOCYTES # BLD MANUAL: 0.14 K/UL
METAMYELOCYTES NFR BLD: 1 %
MONOCYTES # BLD: 0.4 K/UL (ref 0–1)
MONOCYTES # BLD: 0.7 K/UL (ref 0–1)
MONOCYTES # BLD: 0.9 K/UL (ref 0–1)
MONOCYTES # BLD: 0.9 K/UL (ref 0–1)
MONOCYTES # BLD: 1 K/UL (ref 0–1)
MONOCYTES # BLD: 1.1 K/UL (ref 0–1)
MONOCYTES # BLD: 1.2 K/UL (ref 0–1)
MONOCYTES # BLD: 1.6 K/UL (ref 0–1)
MONOCYTES # BLD: 1.9 K/UL (ref 0–1)
MONOCYTES NFR BLD: 13 %
MONOCYTES NFR BLD: 19 %
MONOCYTES NFR BLD: 20 %
MONOCYTES NFR BLD: 20 %
MONOCYTES NFR BLD: 21 %
MONOCYTES NFR BLD: 3 %
MONOCYTES NFR BLD: 7 %
NEUTROPHILS # BLD AUTO: 0.9 K/UL (ref 1.8–7.7)
NEUTROPHILS # BLD AUTO: 1.1 K/UL (ref 1.8–7.7)
NEUTROPHILS # BLD AUTO: 1.4 K/UL (ref 1.8–7.7)
NEUTROPHILS # BLD AUTO: 1.6 K/UL (ref 1.8–7.7)
NEUTROPHILS # BLD AUTO: 1.8 K/UL (ref 1.8–7.7)
NEUTROPHILS # BLD AUTO: 3.3 K/UL (ref 1.8–7.7)
NEUTROPHILS # BLD AUTO: 3.6 K/UL (ref 1.8–7.7)
NEUTROPHILS # BLD AUTO: 4.4 K/UL (ref 1.8–7.7)
NEUTROPHILS # BLD AUTO: 8.9 K/UL (ref 1.8–7.7)
NEUTROPHILS NFR BLD: 13 %
NEUTROPHILS NFR BLD: 16 %
NEUTROPHILS NFR BLD: 23 %
NEUTROPHILS NFR BLD: 25 %
NEUTROPHILS NFR BLD: 27 %
NEUTROPHILS NFR BLD: 40 %
NEUTROPHILS NFR BLD: 41 %
NEUTROPHILS NFR BLD: 44 %
NEUTROPHILS NFR BLD: 53 %
NEUTS BAND NFR BLD: 5 %
NEUTS BAND NFR BLD: 9 %
OSMOLALITY UR CALC.SUM OF ELEC: 286 MOSM/KG (ref 275–295)
OSMOLALITY UR CALC.SUM OF ELEC: 286 MOSM/KG (ref 275–295)
OSMOLALITY UR CALC.SUM OF ELEC: 287 MOSM/KG (ref 275–295)
OSMOLALITY UR CALC.SUM OF ELEC: 287 MOSM/KG (ref 275–295)
OSMOLALITY UR CALC.SUM OF ELEC: 289 MOSM/KG (ref 275–295)
OSMOLALITY UR CALC.SUM OF ELEC: 290 MOSM/KG (ref 275–295)
OSMOLALITY UR CALC.SUM OF ELEC: 295 MOSM/KG (ref 275–295)
PATIENT FASTING: NO
PLATELET # BLD AUTO: 186 K/UL (ref 140–400)
PLATELET # BLD AUTO: 190 K/UL (ref 140–400)
PLATELET # BLD AUTO: 250 K/UL (ref 140–400)
PLATELET # BLD AUTO: 268 K/UL (ref 140–400)
PLATELET # BLD AUTO: 284 K/UL (ref 140–400)
PLATELET # BLD AUTO: 292 K/UL (ref 140–400)
PLATELET # BLD AUTO: 301 K/UL (ref 140–400)
PLATELET # BLD AUTO: 353 K/UL (ref 140–400)
PLATELET # BLD AUTO: 357 K/UL (ref 140–400)
PMV BLD AUTO: 10 FL (ref 7.4–10.3)
PMV BLD AUTO: 10.1 FL (ref 7.4–10.3)
PMV BLD AUTO: 10.2 FL (ref 7.4–10.3)
PMV BLD AUTO: 10.3 FL (ref 7.4–10.3)
PMV BLD AUTO: 10.4 FL (ref 7.4–10.3)
PMV BLD AUTO: 10.5 FL (ref 7.4–10.3)
PMV BLD AUTO: 11 FL (ref 7.4–10.3)
PMV BLD AUTO: 9.6 FL (ref 7.4–10.3)
PMV BLD AUTO: 9.8 FL (ref 7.4–10.3)
POTASSIUM SERPL-SCNC: 3.5 MMOL/L (ref 3.3–5.1)
POTASSIUM SERPL-SCNC: 3.7 MMOL/L (ref 3.3–5.1)
POTASSIUM SERPL-SCNC: 3.8 MMOL/L (ref 3.3–5.1)
POTASSIUM SERPL-SCNC: 3.9 MMOL/L (ref 3.3–5.1)
POTASSIUM SERPL-SCNC: 3.9 MMOL/L (ref 3.3–5.1)
POTASSIUM SERPL-SCNC: 4 MMOL/L (ref 3.3–5.1)
POTASSIUM SERPL-SCNC: 4.1 MMOL/L (ref 3.3–5.1)
PROT SERPL-MCNC: 5.7 G/DL (ref 5.9–8.4)
PROT SERPL-MCNC: 5.9 G/DL (ref 5.9–8.4)
PROT SERPL-MCNC: 6 G/DL (ref 5.9–8.4)
PROT SERPL-MCNC: 6 G/DL (ref 5.9–8.4)
PROT SERPL-MCNC: 6.1 G/DL (ref 5.9–8.4)
PROT SERPL-MCNC: 6.3 G/DL (ref 5.9–8.4)
PROT SERPL-MCNC: 6.3 G/DL (ref 5.9–8.4)
RBC # BLD AUTO: 3.74 M/UL (ref 3.7–5.4)
RBC # BLD AUTO: 3.84 M/UL (ref 3.7–5.4)
RBC # BLD AUTO: 3.85 M/UL (ref 3.7–5.4)
RBC # BLD AUTO: 4.06 M/UL (ref 3.7–5.4)
RBC # BLD AUTO: 4.2 M/UL (ref 3.7–5.4)
RBC # BLD AUTO: 4.21 M/UL (ref 3.7–5.4)
RBC # BLD AUTO: 4.22 M/UL (ref 3.7–5.4)
RBC # BLD AUTO: 4.54 M/UL (ref 3.7–5.4)
RBC # BLD AUTO: 4.69 M/UL (ref 3.7–5.4)
SODIUM SERPL-SCNC: 137 MMOL/L (ref 136–144)
SODIUM SERPL-SCNC: 138 MMOL/L (ref 136–144)
SODIUM SERPL-SCNC: 138 MMOL/L (ref 136–144)
SODIUM SERPL-SCNC: 140 MMOL/L (ref 136–144)
SODIUM SERPL-SCNC: 141 MMOL/L (ref 136–144)
VARIANT LYMPHS NFR BLD MANUAL: 1 %
WBC # BLD AUTO: 14.3 K/UL (ref 4–11)
WBC # BLD AUTO: 4.9 K/UL (ref 4–11)
WBC # BLD AUTO: 6.4 K/UL (ref 4–11)
WBC # BLD AUTO: 6.9 K/UL (ref 4–11)
WBC # BLD AUTO: 7 K/UL (ref 4–11)
WBC # BLD AUTO: 8.1 K/UL (ref 4–11)
WBC # BLD AUTO: 8.1 K/UL (ref 4–11)
WBC # BLD AUTO: 8.9 K/UL (ref 4–11)
WBC # BLD AUTO: 9.4 K/UL (ref 4–11)

## 2018-01-01 PROCEDURE — 85025 COMPLETE CBC W/AUTO DIFF WBC: CPT

## 2018-01-01 PROCEDURE — 99215 OFFICE O/P EST HI 40 MIN: CPT | Performed by: INTERNAL MEDICINE

## 2018-01-01 PROCEDURE — 36591 DRAW BLOOD OFF VENOUS DEVICE: CPT

## 2018-01-01 PROCEDURE — 74177 CT ABD & PELVIS W/CONTRAST: CPT | Performed by: INTERNAL MEDICINE

## 2018-01-01 PROCEDURE — 86301 IMMUNOASSAY TUMOR CA 19-9: CPT

## 2018-01-01 PROCEDURE — 96375 TX/PRO/DX INJ NEW DRUG ADDON: CPT

## 2018-01-01 PROCEDURE — A4216 STERILE WATER/SALINE, 10 ML: HCPCS

## 2018-01-01 PROCEDURE — 80053 COMPREHEN METABOLIC PANEL: CPT

## 2018-01-01 PROCEDURE — 71045 X-RAY EXAM CHEST 1 VIEW: CPT | Performed by: EMERGENCY MEDICINE

## 2018-01-01 PROCEDURE — 96413 CHEMO IV INFUSION 1 HR: CPT

## 2018-01-01 PROCEDURE — 96523 IRRIG DRUG DELIVERY DEVICE: CPT

## 2018-01-01 PROCEDURE — G9678 ONCOLOGY CARE MODEL SERVICE: HCPCS | Performed by: INTERNAL MEDICINE

## 2018-01-01 PROCEDURE — 96367 TX/PROPH/DG ADDL SEQ IV INF: CPT

## 2018-01-01 PROCEDURE — 96372 THER/PROPH/DIAG INJ SC/IM: CPT

## 2018-01-01 PROCEDURE — 85027 COMPLETE CBC AUTOMATED: CPT

## 2018-01-01 PROCEDURE — 85007 BL SMEAR W/DIFF WBC COUNT: CPT

## 2018-01-01 PROCEDURE — 36593 DECLOT VASCULAR DEVICE: CPT

## 2018-01-01 PROCEDURE — 71260 CT THORAX DX C+: CPT | Performed by: INTERNAL MEDICINE

## 2018-01-01 PROCEDURE — 96368 THER/DIAG CONCURRENT INF: CPT

## 2018-01-01 PROCEDURE — 84443 ASSAY THYROID STIM HORMONE: CPT

## 2018-01-01 PROCEDURE — 96360 HYDRATION IV INFUSION INIT: CPT

## 2018-01-01 PROCEDURE — 82565 ASSAY OF CREATININE: CPT

## 2018-01-01 PROCEDURE — 96415 CHEMO IV INFUSION ADDL HR: CPT

## 2018-01-01 PROCEDURE — 96377 APPLICATON ON-BODY INJECTOR: CPT

## 2018-01-01 PROCEDURE — 99214 OFFICE O/P EST MOD 30 MIN: CPT | Performed by: NURSE PRACTITIONER

## 2018-01-01 PROCEDURE — 99223 1ST HOSP IP/OBS HIGH 75: CPT | Performed by: INTERNAL MEDICINE

## 2018-01-01 PROCEDURE — 99215 OFFICE O/P EST HI 40 MIN: CPT | Performed by: PHYSICIAN ASSISTANT

## 2018-01-01 PROCEDURE — 83735 ASSAY OF MAGNESIUM: CPT

## 2018-01-01 PROCEDURE — 80048 BASIC METABOLIC PNL TOTAL CA: CPT

## 2018-01-01 PROCEDURE — 85060 BLOOD SMEAR INTERPRETATION: CPT

## 2018-01-01 PROCEDURE — 96361 HYDRATE IV INFUSION ADD-ON: CPT

## 2018-01-01 PROCEDURE — 99214 OFFICE O/P EST MOD 30 MIN: CPT | Performed by: PHYSICIAN ASSISTANT

## 2018-01-01 PROCEDURE — 99232 SBSQ HOSP IP/OBS MODERATE 35: CPT | Performed by: INTERNAL MEDICINE

## 2018-01-01 RX ORDER — HEPARIN SODIUM (PORCINE) LOCK FLUSH IV SOLN 100 UNIT/ML 100 UNIT/ML
SOLUTION INTRAVENOUS
Status: COMPLETED
Start: 2018-01-01 | End: 2018-01-01

## 2018-01-01 RX ORDER — PROCHLORPERAZINE MALEATE 10 MG
10 TABLET ORAL EVERY 6 HOURS PRN
Status: CANCELLED | OUTPATIENT
Start: 2018-01-01

## 2018-01-01 RX ORDER — POTASSIUM CHLORIDE 29.8 MG/ML
40 INJECTION INTRAVENOUS ONCE
Status: COMPLETED | OUTPATIENT
Start: 2018-01-01 | End: 2018-01-01

## 2018-01-01 RX ORDER — 0.9 % SODIUM CHLORIDE 0.9 %
VIAL (ML) INJECTION
Status: DISCONTINUED
Start: 2018-01-01 | End: 2018-01-01

## 2018-01-01 RX ORDER — HEPARIN SODIUM (PORCINE) LOCK FLUSH IV SOLN 100 UNIT/ML 100 UNIT/ML
5 SOLUTION INTRAVENOUS ONCE
Status: COMPLETED | OUTPATIENT
Start: 2018-01-01 | End: 2018-01-01

## 2018-01-01 RX ORDER — HEPARIN SODIUM (PORCINE) LOCK FLUSH IV SOLN 100 UNIT/ML 100 UNIT/ML
5 SOLUTION INTRAVENOUS ONCE
Status: CANCELLED | OUTPATIENT
Start: 2018-01-01

## 2018-01-01 RX ORDER — 0.9 % SODIUM CHLORIDE 0.9 %
10 VIAL (ML) INJECTION ONCE
Status: CANCELLED | OUTPATIENT
Start: 2018-01-01

## 2018-01-01 RX ORDER — SODIUM CHLORIDE 9 MG/ML
INJECTION, SOLUTION INTRAVENOUS
Status: DISCONTINUED
Start: 2018-01-01 | End: 2018-01-01

## 2018-01-01 RX ORDER — LORAZEPAM 2 MG/ML
INJECTION INTRAMUSCULAR EVERY 4 HOURS PRN
Status: CANCELLED | OUTPATIENT
Start: 2018-01-01

## 2018-01-01 RX ORDER — ATROPINE SULFATE 0.4 MG/ML
0.2 AMPUL (ML) INJECTION ONCE
Status: COMPLETED | OUTPATIENT
Start: 2018-01-01 | End: 2018-01-01

## 2018-01-01 RX ORDER — LORAZEPAM 1 MG/1
TABLET ORAL EVERY 4 HOURS PRN
Status: CANCELLED | OUTPATIENT
Start: 2018-01-01

## 2018-01-01 RX ORDER — FLUOROURACIL 50 MG/ML
2400 INJECTION, SOLUTION INTRAVENOUS CONTINUOUS
Status: CANCELLED | OUTPATIENT
Start: 2018-01-01

## 2018-01-01 RX ORDER — ATROPINE SULFATE 0.4 MG/ML
0.2 AMPUL (ML) INJECTION ONCE
Status: CANCELLED | OUTPATIENT
Start: 2018-01-01

## 2018-01-01 RX ORDER — FLUOROURACIL 50 MG/ML
1800 INJECTION, SOLUTION INTRAVENOUS CONTINUOUS
Status: CANCELLED | OUTPATIENT
Start: 2018-01-01

## 2018-01-01 RX ORDER — HEPARIN SODIUM (PORCINE) LOCK FLUSH IV SOLN 100 UNIT/ML 100 UNIT/ML
SOLUTION INTRAVENOUS
Status: DISCONTINUED
Start: 2018-01-01 | End: 2018-01-01

## 2018-01-01 RX ORDER — METOCLOPRAMIDE HYDROCHLORIDE 5 MG/ML
10 INJECTION INTRAMUSCULAR; INTRAVENOUS EVERY 6 HOURS PRN
Status: CANCELLED | OUTPATIENT
Start: 2018-01-01

## 2018-01-01 RX ORDER — XYLITOL/YERBA SANTA
AEROSOL, SPRAY WITH PUMP (ML) MUCOUS MEMBRANE AS NEEDED
Status: DISCONTINUED | OUTPATIENT
Start: 2018-01-01 | End: 2018-01-01

## 2018-01-01 RX ORDER — PANTOPRAZOLE SODIUM 40 MG/1
40 TABLET, DELAYED RELEASE ORAL
Qty: 30 TABLET | Refills: 6 | Status: SHIPPED | OUTPATIENT
Start: 2018-01-01 | End: 2018-01-01 | Stop reason: ALTCHOICE

## 2018-01-01 RX ORDER — 0.9 % SODIUM CHLORIDE 0.9 %
VIAL (ML) INJECTION
Status: COMPLETED
Start: 2018-01-01 | End: 2018-01-01

## 2018-01-01 RX ORDER — AMIODARONE HYDROCHLORIDE 200 MG/1
200 TABLET ORAL EVERY OTHER DAY
Status: DISCONTINUED | OUTPATIENT
Start: 2018-01-01 | End: 2018-01-01

## 2018-01-01 RX ORDER — LOPERAMIDE HYDROCHLORIDE 2 MG/1
2 CAPSULE ORAL 4 TIMES DAILY PRN
Qty: 60 CAPSULE | Refills: 1 | Status: SHIPPED | OUTPATIENT
Start: 2018-01-01

## 2018-01-01 RX ORDER — DEXTROSE MONOHYDRATE 50 MG/ML
INJECTION, SOLUTION INTRAVENOUS
Status: DISCONTINUED
Start: 2018-01-01 | End: 2018-01-01

## 2018-01-01 RX ORDER — MIRTAZAPINE 15 MG/1
15 TABLET, FILM COATED ORAL NIGHTLY
Status: DISCONTINUED | OUTPATIENT
Start: 2018-01-01 | End: 2018-01-01

## 2018-01-01 RX ORDER — HYDROMORPHONE HYDROCHLORIDE 1 MG/ML
0.2 INJECTION, SOLUTION INTRAMUSCULAR; INTRAVENOUS; SUBCUTANEOUS EVERY 2 HOUR PRN
Status: DISCONTINUED | OUTPATIENT
Start: 2018-01-01 | End: 2018-01-01

## 2018-01-01 RX ORDER — FLUOROURACIL 50 MG/ML
1800 INJECTION, SOLUTION INTRAVENOUS CONTINUOUS
Status: DISCONTINUED | OUTPATIENT
Start: 2018-01-01 | End: 2018-01-01

## 2018-01-01 RX ORDER — SODIUM CHLORIDE 9 MG/ML
INJECTION, SOLUTION INTRAVENOUS
Status: DISCONTINUED
Start: 2018-01-01 | End: 2018-01-01 | Stop reason: WASHOUT

## 2018-01-01 RX ORDER — SODIUM CHLORIDE 9 MG/ML
INJECTION, SOLUTION INTRAVENOUS CONTINUOUS
Status: ACTIVE | OUTPATIENT
Start: 2018-01-01 | End: 2018-01-01

## 2018-01-01 RX ORDER — ACETAMINOPHEN 325 MG/1
650 TABLET ORAL EVERY 6 HOURS PRN
Status: DISCONTINUED | OUTPATIENT
Start: 2018-01-01 | End: 2018-01-01

## 2018-01-01 RX ORDER — PANTOPRAZOLE SODIUM 40 MG/1
40 TABLET, DELAYED RELEASE ORAL
Status: DISCONTINUED | OUTPATIENT
Start: 2018-01-01 | End: 2018-01-01

## 2018-01-01 RX ORDER — MAGNESIUM OXIDE 400 MG (241.3 MG MAGNESIUM) TABLET
400 TABLET ONCE
Status: COMPLETED | OUTPATIENT
Start: 2018-01-01 | End: 2018-01-01

## 2018-01-01 RX ORDER — 0.9 % SODIUM CHLORIDE 0.9 %
10 VIAL (ML) INJECTION ONCE
Status: COMPLETED | OUTPATIENT
Start: 2018-01-01 | End: 2018-01-01

## 2018-01-01 RX ORDER — HEPARIN SODIUM (PORCINE) LOCK FLUSH IV SOLN 100 UNIT/ML 100 UNIT/ML
SOLUTION INTRAVENOUS
Status: DISCONTINUED
Start: 2018-01-01 | End: 2018-01-01 | Stop reason: WASHOUT

## 2018-01-01 RX ORDER — FLUTICASONE PROPIONATE 50 MCG
2 SPRAY, SUSPENSION (ML) NASAL DAILY
Status: DISCONTINUED | OUTPATIENT
Start: 2018-01-01 | End: 2018-01-01

## 2018-01-01 RX ORDER — SUCRALFATE ORAL 1 G/10ML
1 SUSPENSION ORAL
Status: DISCONTINUED | OUTPATIENT
Start: 2018-01-01 | End: 2018-01-01

## 2018-01-01 RX ORDER — SODIUM CHLORIDE 0.9 % (FLUSH) 0.9 %
3 SYRINGE (ML) INJECTION AS NEEDED
Status: DISCONTINUED | OUTPATIENT
Start: 2018-01-01 | End: 2018-01-01

## 2018-01-01 RX ORDER — FLUOROURACIL 50 MG/ML
2400 INJECTION, SOLUTION INTRAVENOUS CONTINUOUS
Status: DISCONTINUED | OUTPATIENT
Start: 2018-01-01 | End: 2018-01-01

## 2018-01-01 RX ORDER — ONDANSETRON 2 MG/ML
4 INJECTION INTRAMUSCULAR; INTRAVENOUS EVERY 6 HOURS PRN
Status: DISCONTINUED | OUTPATIENT
Start: 2018-01-01 | End: 2018-01-01

## 2018-01-01 RX ORDER — ACYCLOVIR 400 MG/1
400 TABLET ORAL EVERY 8 HOURS
Status: DISCONTINUED | OUTPATIENT
Start: 2018-01-01 | End: 2018-01-01

## 2018-01-01 RX ORDER — DEXTROSE, SODIUM CHLORIDE, AND POTASSIUM CHLORIDE 5; .9; .15 G/100ML; G/100ML; G/100ML
INJECTION INTRAVENOUS CONTINUOUS
Status: DISCONTINUED | OUTPATIENT
Start: 2018-01-01 | End: 2018-01-01

## 2018-01-01 RX ORDER — HEPARIN SODIUM (PORCINE) LOCK FLUSH IV SOLN 100 UNIT/ML 100 UNIT/ML
5 SOLUTION INTRAVENOUS ONCE
Status: DISCONTINUED | OUTPATIENT
Start: 2018-01-01 | End: 2018-01-01

## 2018-01-01 RX ORDER — ACYCLOVIR 400 MG/1
400 TABLET ORAL EVERY 8 HOURS
Qty: 90 TABLET | Refills: 3 | Status: SHIPPED | OUTPATIENT
Start: 2018-01-01 | End: 2018-01-01 | Stop reason: ALTCHOICE

## 2018-01-01 RX ORDER — HYDROMORPHONE HYDROCHLORIDE 1 MG/ML
0.4 INJECTION, SOLUTION INTRAMUSCULAR; INTRAVENOUS; SUBCUTANEOUS EVERY 2 HOUR PRN
Status: DISCONTINUED | OUTPATIENT
Start: 2018-01-01 | End: 2018-01-01

## 2018-01-01 RX ORDER — HYDROCODONE BITARTRATE AND ACETAMINOPHEN 5; 325 MG/1; MG/1
1 TABLET ORAL EVERY 6 HOURS PRN
Status: DISCONTINUED | OUTPATIENT
Start: 2018-01-01 | End: 2018-01-01

## 2018-01-01 RX ORDER — 0.9 % SODIUM CHLORIDE 0.9 %
10 VIAL (ML) INJECTION ONCE
Status: DISCONTINUED | OUTPATIENT
Start: 2018-01-01 | End: 2018-01-01

## 2018-01-01 RX ORDER — ONDANSETRON 2 MG/ML
4 INJECTION INTRAMUSCULAR; INTRAVENOUS EVERY 4 HOURS PRN
Status: DISCONTINUED | OUTPATIENT
Start: 2018-01-01 | End: 2018-01-01

## 2018-01-01 RX ORDER — CALCIUM CARBONATE 200(500)MG
500 TABLET,CHEWABLE ORAL 3 TIMES DAILY PRN
Status: DISCONTINUED | OUTPATIENT
Start: 2018-01-01 | End: 2018-01-01

## 2018-01-01 RX ORDER — LOPERAMIDE HYDROCHLORIDE 2 MG/1
2 CAPSULE ORAL 4 TIMES DAILY PRN
Status: DISCONTINUED | OUTPATIENT
Start: 2018-01-01 | End: 2018-01-01

## 2018-01-01 RX ORDER — ALPRAZOLAM 1 MG/1
1 TABLET ORAL NIGHTLY PRN
Status: DISCONTINUED | OUTPATIENT
Start: 2018-01-01 | End: 2018-01-01

## 2018-01-01 RX ORDER — CHOLECALCIFEROL (VITAMIN D3) 125 MCG
1000 CAPSULE ORAL DAILY
Status: DISCONTINUED | OUTPATIENT
Start: 2018-01-01 | End: 2018-01-01

## 2018-01-01 RX ORDER — POTASSIUM CHLORIDE 20 MEQ/1
40 TABLET, EXTENDED RELEASE ORAL EVERY 4 HOURS
Status: DISCONTINUED | OUTPATIENT
Start: 2018-01-01 | End: 2018-01-01

## 2018-01-01 RX ORDER — PANTOPRAZOLE SODIUM 40 MG/1
TABLET, DELAYED RELEASE ORAL
Qty: 30 TABLET | Refills: 0 | Status: CANCELLED | OUTPATIENT
Start: 2018-01-01

## 2018-01-01 RX ORDER — SUCRALFATE ORAL 1 G/10ML
1 SUSPENSION ORAL
Qty: 420 ML | Refills: 0 | Status: ON HOLD | OUTPATIENT
Start: 2018-01-01 | End: 2018-01-01

## 2018-01-01 RX ORDER — SODIUM CHLORIDE 9 MG/ML
INJECTION, SOLUTION INTRAVENOUS
Status: COMPLETED
Start: 2018-01-01 | End: 2018-01-01

## 2018-01-01 RX ORDER — HYDROMORPHONE HYDROCHLORIDE 1 MG/ML
0.3 INJECTION, SOLUTION INTRAMUSCULAR; INTRAVENOUS; SUBCUTANEOUS EVERY 2 HOUR PRN
Status: DISCONTINUED | OUTPATIENT
Start: 2018-01-01 | End: 2018-01-01

## 2018-01-01 RX ORDER — MORPHINE SULFATE 2 MG/ML
1 INJECTION, SOLUTION INTRAMUSCULAR; INTRAVENOUS EVERY 2 HOUR PRN
Status: DISCONTINUED | OUTPATIENT
Start: 2018-01-01 | End: 2018-01-01

## 2018-01-01 RX ORDER — DEXTROSE MONOHYDRATE 50 MG/ML
INJECTION, SOLUTION INTRAVENOUS
Status: DISCONTINUED
Start: 2018-01-01 | End: 2018-01-01 | Stop reason: WASHOUT

## 2018-01-01 RX ORDER — MORPHINE SULFATE 4 MG/ML
4 INJECTION, SOLUTION INTRAMUSCULAR; INTRAVENOUS EVERY 2 HOUR PRN
Status: DISCONTINUED | OUTPATIENT
Start: 2018-01-01 | End: 2018-01-01

## 2018-01-01 RX ORDER — MORPHINE SULFATE 2 MG/ML
2 INJECTION, SOLUTION INTRAMUSCULAR; INTRAVENOUS EVERY 2 HOUR PRN
Status: DISCONTINUED | OUTPATIENT
Start: 2018-01-01 | End: 2018-01-01

## 2018-01-01 RX ORDER — POTASSIUM CHLORIDE 14.9 MG/ML
20 INJECTION INTRAVENOUS ONCE
Status: COMPLETED | OUTPATIENT
Start: 2018-01-01 | End: 2018-01-01

## 2018-01-01 RX ADMIN — HEPARIN SODIUM (PORCINE) LOCK FLUSH IV SOLN 100 UNIT/ML 500 UNITS: 100 SOLUTION INTRAVENOUS at 12:13:00

## 2018-01-01 RX ADMIN — HEPARIN SODIUM (PORCINE) LOCK FLUSH IV SOLN 100 UNIT/ML 500 UNITS: 100 SOLUTION INTRAVENOUS at 13:15:00

## 2018-01-01 RX ADMIN — HEPARIN SODIUM (PORCINE) LOCK FLUSH IV SOLN 100 UNIT/ML 500 UNITS: 100 SOLUTION INTRAVENOUS at 13:08:00

## 2018-01-01 RX ADMIN — HEPARIN SODIUM (PORCINE) LOCK FLUSH IV SOLN 100 UNIT/ML 500 UNITS: 100 SOLUTION INTRAVENOUS at 15:40:00

## 2018-01-01 RX ADMIN — HEPARIN SODIUM (PORCINE) LOCK FLUSH IV SOLN 100 UNIT/ML 500 UNITS: 100 SOLUTION INTRAVENOUS at 13:43:00

## 2018-01-01 RX ADMIN — HEPARIN SODIUM (PORCINE) LOCK FLUSH IV SOLN 100 UNIT/ML 500 UNITS: 100 SOLUTION INTRAVENOUS at 15:50:00

## 2018-01-01 RX ADMIN — HEPARIN SODIUM (PORCINE) LOCK FLUSH IV SOLN 100 UNIT/ML 500 UNITS: 100 SOLUTION INTRAVENOUS at 13:45:00

## 2018-01-01 RX ADMIN — 0.9 % SODIUM CHLORIDE 10 ML: 0.9 % VIAL (ML) INJECTION at 14:00:00

## 2018-01-01 RX ADMIN — HEPARIN SODIUM (PORCINE) LOCK FLUSH IV SOLN 100 UNIT/ML 500 UNITS: 100 SOLUTION INTRAVENOUS at 14:04:00

## 2018-01-01 RX ADMIN — ATROPINE SULFATE 0.2 MG: 0.4 MG/ML AMPUL (ML) INJECTION at 12:00:00

## 2018-01-01 RX ADMIN — HEPARIN SODIUM (PORCINE) LOCK FLUSH IV SOLN 100 UNIT/ML 500 UNITS: 100 SOLUTION INTRAVENOUS at 13:40:00

## 2018-01-01 RX ADMIN — 0.9 % SODIUM CHLORIDE 10 ML: 0.9 % VIAL (ML) INJECTION at 15:15:00

## 2018-01-01 RX ADMIN — 0.9 % SODIUM CHLORIDE 10 ML: 0.9 % VIAL (ML) INJECTION at 15:50:00

## 2018-01-01 RX ADMIN — ATROPINE SULFATE 0.2 MG: 0.4 MG/ML AMPUL (ML) INJECTION at 11:45:00

## 2018-01-01 RX ADMIN — SODIUM CHLORIDE 1000 ML: 9 INJECTION, SOLUTION INTRAVENOUS at 09:30:00

## 2018-01-01 RX ADMIN — FLUOROURACIL 3500 MG: 50 INJECTION, SOLUTION INTRAVENOUS at 11:53:00

## 2018-01-01 RX ADMIN — FLUOROURACIL 4750 MG: 50 INJECTION, SOLUTION INTRAVENOUS at 13:19:00

## 2018-01-01 RX ADMIN — ATROPINE SULFATE 0.2 MG: 0.4 MG/ML AMPUL (ML) INJECTION at 08:50:00

## 2018-01-01 RX ADMIN — FLUOROURACIL 3450 MG: 50 INJECTION, SOLUTION INTRAVENOUS at 15:22:00

## 2018-01-01 RX ADMIN — HEPARIN SODIUM (PORCINE) LOCK FLUSH IV SOLN 100 UNIT/ML 500 UNITS: 100 SOLUTION INTRAVENOUS at 11:50:00

## 2018-01-01 RX ADMIN — HEPARIN SODIUM (PORCINE) LOCK FLUSH IV SOLN 100 UNIT/ML 500 UNITS: 100 SOLUTION INTRAVENOUS at 13:11:00

## 2018-01-01 RX ADMIN — HEPARIN SODIUM (PORCINE) LOCK FLUSH IV SOLN 100 UNIT/ML 500 UNITS: 100 SOLUTION INTRAVENOUS at 14:00:00

## 2018-01-01 RX ADMIN — HEPARIN SODIUM (PORCINE) LOCK FLUSH IV SOLN 100 UNIT/ML 500 UNITS: 100 SOLUTION INTRAVENOUS at 10:43:00

## 2018-01-01 RX ADMIN — HEPARIN SODIUM (PORCINE) LOCK FLUSH IV SOLN 100 UNIT/ML 500 UNITS: 100 SOLUTION INTRAVENOUS at 14:16:00

## 2018-01-01 RX ADMIN — HEPARIN SODIUM (PORCINE) LOCK FLUSH IV SOLN 100 UNIT/ML 500 UNITS: 100 SOLUTION INTRAVENOUS at 11:14:00

## 2018-01-01 RX ADMIN — ATROPINE SULFATE 0.2 MG: 0.4 MG/ML AMPUL (ML) INJECTION at 12:32:00

## 2018-01-01 RX ADMIN — HEPARIN SODIUM (PORCINE) LOCK FLUSH IV SOLN 100 UNIT/ML 500 UNITS: 100 SOLUTION INTRAVENOUS at 15:15:00

## 2018-01-01 RX ADMIN — FLUOROURACIL 3450 MG: 50 INJECTION, SOLUTION INTRAVENOUS at 15:23:00

## 2018-01-01 RX ADMIN — SODIUM CHLORIDE 1000 ML: 9 INJECTION, SOLUTION INTRAVENOUS at 13:51:00

## 2018-01-01 RX ADMIN — HEPARIN SODIUM (PORCINE) LOCK FLUSH IV SOLN 100 UNIT/ML 500 UNITS: 100 SOLUTION INTRAVENOUS at 11:15:00

## 2018-01-01 RX ADMIN — FLUOROURACIL 3450 MG: 50 INJECTION, SOLUTION INTRAVENOUS at 16:35:00

## 2018-01-01 RX ADMIN — HEPARIN SODIUM (PORCINE) LOCK FLUSH IV SOLN 100 UNIT/ML 500 UNITS: 100 SOLUTION INTRAVENOUS at 13:52:00

## 2018-01-01 RX ADMIN — HEPARIN SODIUM (PORCINE) LOCK FLUSH IV SOLN 100 UNIT/ML 500 UNITS: 100 SOLUTION INTRAVENOUS at 12:45:00

## 2018-01-01 RX ADMIN — FLUOROURACIL 4700 MG: 50 INJECTION, SOLUTION INTRAVENOUS at 15:42:00

## 2018-01-01 RX ADMIN — FLUOROURACIL 3500 MG: 50 INJECTION, SOLUTION INTRAVENOUS at 14:49:00

## 2018-01-01 RX ADMIN — ATROPINE SULFATE 0.2 MG: 0.4 MG/ML AMPUL (ML) INJECTION at 13:10:00

## 2018-01-01 RX ADMIN — FLUOROURACIL 4700 MG: 50 INJECTION, SOLUTION INTRAVENOUS at 13:39:00

## 2018-01-01 RX ADMIN — HEPARIN SODIUM (PORCINE) LOCK FLUSH IV SOLN 100 UNIT/ML 500 UNITS: 100 SOLUTION INTRAVENOUS at 15:00:00

## 2018-01-01 RX ADMIN — HEPARIN SODIUM (PORCINE) LOCK FLUSH IV SOLN 100 UNIT/ML 500 UNITS: 100 SOLUTION INTRAVENOUS at 13:30:00

## 2018-01-01 RX ADMIN — HEPARIN SODIUM (PORCINE) LOCK FLUSH IV SOLN 100 UNIT/ML 500 UNITS: 100 SOLUTION INTRAVENOUS at 12:20:00

## 2018-01-01 RX ADMIN — HEPARIN SODIUM (PORCINE) LOCK FLUSH IV SOLN 100 UNIT/ML 500 UNITS: 100 SOLUTION INTRAVENOUS at 10:35:00

## 2018-01-01 RX ADMIN — HEPARIN SODIUM (PORCINE) LOCK FLUSH IV SOLN 100 UNIT/ML 500 UNITS: 100 SOLUTION INTRAVENOUS at 13:34:00

## 2018-01-01 RX ADMIN — HEPARIN SODIUM (PORCINE) LOCK FLUSH IV SOLN 100 UNIT/ML 500 UNITS: 100 SOLUTION INTRAVENOUS at 15:08:00

## 2018-01-01 RX ADMIN — HEPARIN SODIUM (PORCINE) LOCK FLUSH IV SOLN 100 UNIT/ML 500 UNITS: 100 SOLUTION INTRAVENOUS at 14:18:00

## 2018-01-01 RX ADMIN — HEPARIN SODIUM (PORCINE) LOCK FLUSH IV SOLN 100 UNIT/ML 500 UNITS: 100 SOLUTION INTRAVENOUS at 14:53:00

## 2018-01-04 ENCOUNTER — NURSE ONLY (OUTPATIENT)
Dept: HEMATOLOGY/ONCOLOGY | Facility: HOSPITAL | Age: 73
End: 2018-01-04
Attending: INTERNAL MEDICINE
Payer: MEDICARE

## 2018-01-04 ENCOUNTER — OFFICE VISIT (OUTPATIENT)
Dept: HEMATOLOGY/ONCOLOGY | Facility: HOSPITAL | Age: 73
End: 2018-01-04
Attending: PHYSICIAN ASSISTANT
Payer: MEDICARE

## 2018-01-04 VITALS
TEMPERATURE: 98 F | HEIGHT: 66.5 IN | RESPIRATION RATE: 18 BRPM | BODY MASS INDEX: 29.38 KG/M2 | SYSTOLIC BLOOD PRESSURE: 144 MMHG | HEART RATE: 95 BPM | WEIGHT: 185 LBS | DIASTOLIC BLOOD PRESSURE: 67 MMHG

## 2018-01-04 DIAGNOSIS — K21.9 GASTROESOPHAGEAL REFLUX DISEASE, ESOPHAGITIS PRESENCE NOT SPECIFIED: ICD-10-CM

## 2018-01-04 DIAGNOSIS — F41.9 ANXIETY: ICD-10-CM

## 2018-01-04 DIAGNOSIS — B19.20 HEPATITIS C VIRUS INFECTION WITHOUT HEPATIC COMA, UNSPECIFIED CHRONICITY: ICD-10-CM

## 2018-01-04 DIAGNOSIS — Z51.11 CHEMOTHERAPY MANAGEMENT, ENCOUNTER FOR: ICD-10-CM

## 2018-01-04 DIAGNOSIS — C25.1 MALIGNANT NEOPLASM OF BODY OF PANCREAS (HCC): Primary | ICD-10-CM

## 2018-01-04 DIAGNOSIS — I48.92 ATRIAL FLUTTER, UNSPECIFIED TYPE (HCC): ICD-10-CM

## 2018-01-04 LAB
ALBUMIN SERPL BCP-MCNC: 3.7 G/DL (ref 3.5–4.8)
ALBUMIN/GLOB SERPL: 1.9 {RATIO} (ref 1–2)
ALP SERPL-CCNC: 70 U/L (ref 32–100)
ALT SERPL-CCNC: 17 U/L (ref 14–54)
ANION GAP SERPL CALC-SCNC: 13 MMOL/L (ref 0–18)
AST SERPL-CCNC: 36 U/L (ref 15–41)
BASOPHILS # BLD: 0.1 K/UL (ref 0–0.2)
BASOPHILS NFR BLD: 1 %
BILIRUB SERPL-MCNC: 0.5 MG/DL (ref 0.3–1.2)
BUN SERPL-MCNC: 9 MG/DL (ref 8–20)
BUN/CREAT SERPL: 11.4 (ref 10–20)
CALCIUM SERPL-MCNC: 9.2 MG/DL (ref 8.5–10.5)
CHLORIDE SERPL-SCNC: 104 MMOL/L (ref 95–110)
CO2 SERPL-SCNC: 22 MMOL/L (ref 22–32)
CREAT SERPL-MCNC: 0.79 MG/DL (ref 0.5–1.5)
EOSINOPHIL # BLD: 0.1 K/UL (ref 0–0.7)
EOSINOPHIL NFR BLD: 1 %
ERYTHROCYTE [DISTWIDTH] IN BLOOD BY AUTOMATED COUNT: 25.2 % (ref 11–15)
GLOBULIN PLAS-MCNC: 2 G/DL (ref 2.5–3.7)
GLUCOSE SERPL-MCNC: 155 MG/DL (ref 70–99)
HCT VFR BLD AUTO: 33.5 % (ref 35–48)
HGB BLD-MCNC: 10.9 G/DL (ref 12–16)
LYMPHOCYTES # BLD: 3.2 K/UL (ref 1–4)
LYMPHOCYTES NFR BLD: 34 %
MCH RBC QN AUTO: 32.6 PG (ref 27–32)
MCHC RBC AUTO-ENTMCNC: 32.6 G/DL (ref 32–37)
MCV RBC AUTO: 99.9 FL (ref 80–100)
METAMYELOCYTES # BLD MANUAL: 0.18 K/UL
METAMYELOCYTES NFR BLD: 2 %
MONOCYTES # BLD: 1.7 K/UL (ref 0–1)
MONOCYTES NFR BLD: 19 %
NEUTROPHILS # BLD AUTO: 3.8 K/UL (ref 1.8–7.7)
NEUTROPHILS NFR BLD: 42 %
OSMOLALITY UR CALC.SUM OF ELEC: 290 MOSM/KG (ref 275–295)
PLATELET # BLD AUTO: 372 K/UL (ref 140–400)
PMV BLD AUTO: 9.9 FL (ref 7.4–10.3)
POTASSIUM SERPL-SCNC: 3.6 MMOL/L (ref 3.3–5.1)
PROT SERPL-MCNC: 5.7 G/DL (ref 5.9–8.4)
RBC # BLD AUTO: 3.35 M/UL (ref 3.7–5.4)
SODIUM SERPL-SCNC: 139 MMOL/L (ref 136–144)
VARIANT LYMPHS NFR BLD MANUAL: 1 %
WBC # BLD AUTO: 9.1 K/UL (ref 4–11)

## 2018-01-04 PROCEDURE — 99215 OFFICE O/P EST HI 40 MIN: CPT | Performed by: INTERNAL MEDICINE

## 2018-01-04 PROCEDURE — 80053 COMPREHEN METABOLIC PANEL: CPT

## 2018-01-04 PROCEDURE — 36591 DRAW BLOOD OFF VENOUS DEVICE: CPT

## 2018-01-04 PROCEDURE — 86301 IMMUNOASSAY TUMOR CA 19-9: CPT

## 2018-01-04 PROCEDURE — 85007 BL SMEAR W/DIFF WBC COUNT: CPT

## 2018-01-04 PROCEDURE — 85027 COMPLETE CBC AUTOMATED: CPT

## 2018-01-04 PROCEDURE — G0463 HOSPITAL OUTPT CLINIC VISIT: HCPCS | Performed by: INTERNAL MEDICINE

## 2018-01-04 PROCEDURE — 85025 COMPLETE CBC W/AUTO DIFF WBC: CPT

## 2018-01-04 RX ORDER — LORAZEPAM 1 MG/1
TABLET ORAL EVERY 4 HOURS PRN
Status: CANCELLED | OUTPATIENT
Start: 2018-01-18

## 2018-01-04 RX ORDER — LORAZEPAM 1 MG/1
TABLET ORAL EVERY 4 HOURS PRN
Status: CANCELLED | OUTPATIENT
Start: 2018-01-04

## 2018-01-04 RX ORDER — PROCHLORPERAZINE MALEATE 10 MG
10 TABLET ORAL EVERY 6 HOURS PRN
Status: CANCELLED | OUTPATIENT
Start: 2018-01-11

## 2018-01-04 RX ORDER — 0.9 % SODIUM CHLORIDE 0.9 %
VIAL (ML) INJECTION
Status: DISCONTINUED
Start: 2018-01-04 | End: 2018-01-04

## 2018-01-04 RX ORDER — LORAZEPAM 2 MG/ML
INJECTION INTRAMUSCULAR EVERY 4 HOURS PRN
Status: CANCELLED | OUTPATIENT
Start: 2018-01-18

## 2018-01-04 RX ORDER — CAPECITABINE 150 MG/1
150 TABLET, FILM COATED ORAL 2 TIMES DAILY
Qty: 42 TABLET | Refills: 0 | Status: SHIPPED | OUTPATIENT
Start: 2018-01-04 | End: 2018-01-11

## 2018-01-04 RX ORDER — METOCLOPRAMIDE HYDROCHLORIDE 5 MG/ML
10 INJECTION INTRAMUSCULAR; INTRAVENOUS EVERY 6 HOURS PRN
Status: CANCELLED | OUTPATIENT
Start: 2018-01-04

## 2018-01-04 RX ORDER — HEPARIN SODIUM (PORCINE) LOCK FLUSH IV SOLN 100 UNIT/ML 100 UNIT/ML
5 SOLUTION INTRAVENOUS ONCE
Status: CANCELLED | OUTPATIENT
Start: 2018-01-04

## 2018-01-04 RX ORDER — METOCLOPRAMIDE HYDROCHLORIDE 5 MG/ML
10 INJECTION INTRAMUSCULAR; INTRAVENOUS EVERY 6 HOURS PRN
Status: CANCELLED | OUTPATIENT
Start: 2018-01-11

## 2018-01-04 RX ORDER — METOCLOPRAMIDE HYDROCHLORIDE 5 MG/ML
10 INJECTION INTRAMUSCULAR; INTRAVENOUS EVERY 6 HOURS PRN
Status: CANCELLED | OUTPATIENT
Start: 2018-01-18

## 2018-01-04 RX ORDER — LORAZEPAM 2 MG/ML
INJECTION INTRAMUSCULAR EVERY 4 HOURS PRN
Status: CANCELLED | OUTPATIENT
Start: 2018-01-11

## 2018-01-04 RX ORDER — LORAZEPAM 2 MG/ML
INJECTION INTRAMUSCULAR EVERY 4 HOURS PRN
Status: CANCELLED | OUTPATIENT
Start: 2018-01-04

## 2018-01-04 RX ORDER — LORAZEPAM 1 MG/1
TABLET ORAL EVERY 4 HOURS PRN
Status: CANCELLED | OUTPATIENT
Start: 2018-01-11

## 2018-01-04 RX ORDER — PROCHLORPERAZINE MALEATE 10 MG
10 TABLET ORAL EVERY 6 HOURS PRN
Status: CANCELLED | OUTPATIENT
Start: 2018-01-04

## 2018-01-04 RX ORDER — PROCHLORPERAZINE MALEATE 10 MG
10 TABLET ORAL EVERY 6 HOURS PRN
Status: CANCELLED | OUTPATIENT
Start: 2018-01-18

## 2018-01-04 RX ORDER — HEPARIN SODIUM (PORCINE) LOCK FLUSH IV SOLN 100 UNIT/ML 100 UNIT/ML
5 SOLUTION INTRAVENOUS ONCE
Status: COMPLETED | OUTPATIENT
Start: 2018-01-04 | End: 2018-01-04

## 2018-01-04 RX ORDER — SUCRALFATE ORAL 1 G/10ML
1 SUSPENSION ORAL
Qty: 420 ML | Refills: 1 | Status: SHIPPED | OUTPATIENT
Start: 2018-01-04 | End: 2018-01-15

## 2018-01-04 RX ORDER — 0.9 % SODIUM CHLORIDE 0.9 %
10 VIAL (ML) INJECTION ONCE
Status: CANCELLED | OUTPATIENT
Start: 2018-01-04

## 2018-01-04 RX ORDER — HEPARIN SODIUM (PORCINE) LOCK FLUSH IV SOLN 100 UNIT/ML 100 UNIT/ML
SOLUTION INTRAVENOUS
Status: COMPLETED
Start: 2018-01-04 | End: 2018-01-04

## 2018-01-04 RX ADMIN — HEPARIN SODIUM (PORCINE) LOCK FLUSH IV SOLN 100 UNIT/ML 500 UNITS: 100 SOLUTION INTRAVENOUS at 10:00:00

## 2018-01-04 NOTE — PROGRESS NOTES
Pt to lab for pre-chemo blood draw. Pt states c/o sore throat before [place]. Reports that she never got a \"cold\", denies fevers, and sore throat went away after stopping PO chemo pills.   Encouraged pt to discuss with Dr. Gillian Miller today in clinic appoi

## 2018-01-04 NOTE — PROGRESS NOTES
[facility] AND [facility]  Hematology/Oncology  Progress Note    Gera Diane CSN: [id number]    1945 MRN [id number]   Location [address] Attending Juju Maddox MD   Date of Visit 18 PCP Samir Vicente MD     Chief Complaint: Re Thursday, complicated by a pneumothorax and is currently recovering with mild dyspnea. She was incidentally screened for hepatitis C and found to be positive.  [place] underwent surgical pancreatic resection on 7/217 at [facility] by Dr. Andrzej Centeno after • Clotting Disorder Sister 25     recurrent DVT's   • Bleeding Disorders Neg        Social History  Social History   Marital status:    Spouse name: N/A    Years of education: N/A  Number of children: N/A     Occupational History  None on file pressure 144/67, pulse 95, temperature 98.2 °F (36.8 °C), temperature source Oral, resp. rate 18, height 1.689 m (5' 6.5\"), weight 83.9 kg (185 lb). General: Patient is alert and oriented x 3, not in acute distress.    Psych: Relaxed with appropriate qu docusate sodium 100 MG Oral Cap Take by mouth. Disp:  Rfl:    mirtazapine 15 MG Oral Tab Take 1 tablet (15 mg total) by mouth nightly. Disp: 90 tablet Rfl: 1   aspirin 81 MG Oral Tab Take 81 mg by mouth every other day.    Disp: 90 tablet Rfl: 3   amiodar Chemotherapy follow-up examination  Impression:  82 yo F with resectable pancreatic cancer with local regional lymph node involvement [id number], stage IIB s/p cycle 1 of FOLFIRINOX on 4/24/17 with neulasta given on day 3 presents for follow up evaluation s/p benefit in using gemcitabine in the adjuvant setting  --Discussed side effect profiles of both agents; capecitabine and gemcitabine in detail.  Patient has now consented to treatment at this time and is nervous about more chemotherapy, but is interested in [facility] which offers one on one counseling for now and behavioral health as indicated  --Still has some high anxiety scores on visit screen assessments, but she is less anxious and more comfortable with this current chemotherapy regimen  --Currently

## 2018-01-05 ENCOUNTER — OFFICE VISIT (OUTPATIENT)
Dept: HEMATOLOGY/ONCOLOGY | Facility: HOSPITAL | Age: 73
End: 2018-01-05
Attending: INTERNAL MEDICINE
Payer: MEDICARE

## 2018-01-05 ENCOUNTER — NURSE NAVIGATOR ENCOUNTER (OUTPATIENT)
Dept: HEMATOLOGY/ONCOLOGY | Facility: HOSPITAL | Age: 73
End: 2018-01-05

## 2018-01-05 VITALS
HEART RATE: 90 BPM | RESPIRATION RATE: 16 BRPM | TEMPERATURE: 98 F | DIASTOLIC BLOOD PRESSURE: 60 MMHG | SYSTOLIC BLOOD PRESSURE: 141 MMHG

## 2018-01-05 DIAGNOSIS — C25.1 MALIGNANT NEOPLASM OF BODY OF PANCREAS (HCC): Primary | ICD-10-CM

## 2018-01-05 DIAGNOSIS — Z51.11 CHEMOTHERAPY MANAGEMENT, ENCOUNTER FOR: ICD-10-CM

## 2018-01-05 LAB — CANCER AG19-9 SERPL-ACNC: 920 U/ML (ref 0–35)

## 2018-01-05 PROCEDURE — 96413 CHEMO IV INFUSION 1 HR: CPT

## 2018-01-05 PROCEDURE — 96375 TX/PRO/DX INJ NEW DRUG ADDON: CPT

## 2018-01-05 RX ORDER — HEPARIN SODIUM (PORCINE) LOCK FLUSH IV SOLN 100 UNIT/ML 100 UNIT/ML
5 SOLUTION INTRAVENOUS ONCE
Status: COMPLETED | OUTPATIENT
Start: 2018-01-05 | End: 2018-01-05

## 2018-01-05 RX ORDER — SODIUM CHLORIDE 9 MG/ML
INJECTION, SOLUTION INTRAVENOUS
Status: DISCONTINUED
Start: 2018-01-05 | End: 2018-01-05

## 2018-01-05 RX ORDER — HEPARIN SODIUM (PORCINE) LOCK FLUSH IV SOLN 100 UNIT/ML 100 UNIT/ML
SOLUTION INTRAVENOUS
Status: COMPLETED
Start: 2018-01-05 | End: 2018-01-05

## 2018-01-05 RX ORDER — 0.9 % SODIUM CHLORIDE 0.9 %
10 VIAL (ML) INJECTION ONCE
Status: CANCELLED | OUTPATIENT
Start: 2018-01-05

## 2018-01-05 RX ORDER — 0.9 % SODIUM CHLORIDE 0.9 %
VIAL (ML) INJECTION
Status: DISCONTINUED
Start: 2018-01-05 | End: 2018-01-05

## 2018-01-05 RX ORDER — HEPARIN SODIUM (PORCINE) LOCK FLUSH IV SOLN 100 UNIT/ML 100 UNIT/ML
5 SOLUTION INTRAVENOUS ONCE
Status: CANCELLED | OUTPATIENT
Start: 2018-01-05

## 2018-01-05 RX ADMIN — HEPARIN SODIUM (PORCINE) LOCK FLUSH IV SOLN 100 UNIT/ML 500 UNITS: 100 SOLUTION INTRAVENOUS at 13:12:00

## 2018-01-05 NOTE — PROGRESS NOTES
Post Chemo Documentation    Patient is here for treatment today, Cycle 5, Day 1 Gemzar.       Arrives Ambulating independently                                 Modifications in dose or schedule: No      Patient reports she is feeling well, denies any complai wash hands with soap and water. 2. Any sheets or clothes soiled with the bodily fluids should be machine washed twice in hot water with regular laundry detergent. Do not wash soiled garments with hands.   If the soiled garments cannot be washed right away call.     Discharged home Ambulating independently

## 2018-01-05 NOTE — PROGRESS NOTES
Per Dr Dayton Dickey request, [facility] pathology dept contacted and MSI testing ordered on surgical specimen from July 2017 as well as request for [facility] entered online.   Specimen to be retrieved from [facility].

## 2018-01-08 DIAGNOSIS — C25.0 MALIGNANT NEOPLASM OF HEAD OF PANCREAS (HCC): Primary | ICD-10-CM

## 2018-01-10 ENCOUNTER — HOSPITAL ENCOUNTER (OUTPATIENT)
Dept: CT IMAGING | Facility: HOSPITAL | Age: 73
Discharge: HOME | End: 2018-01-10
Attending: INTERNAL MEDICINE
Payer: MEDICARE

## 2018-01-10 ENCOUNTER — TELEPHONE (OUTPATIENT)
Dept: HEMATOLOGY/ONCOLOGY | Facility: HOSPITAL | Age: 73
End: 2018-01-10

## 2018-01-10 DIAGNOSIS — C25.0 MALIGNANT NEOPLASM OF HEAD OF PANCREAS (HCC): ICD-10-CM

## 2018-01-10 PROCEDURE — 71260 CT THORAX DX C+: CPT | Performed by: INTERNAL MEDICINE

## 2018-01-10 PROCEDURE — 74177 CT ABD & PELVIS W/CONTRAST: CPT | Performed by: INTERNAL MEDICINE

## 2018-01-11 ENCOUNTER — OFFICE VISIT (OUTPATIENT)
Dept: HEMATOLOGY/ONCOLOGY | Facility: HOSPITAL | Age: 73
End: 2018-01-11
Attending: PHYSICIAN ASSISTANT
Payer: MEDICARE

## 2018-01-11 VITALS
WEIGHT: 181 LBS | BODY MASS INDEX: 28.75 KG/M2 | HEIGHT: 66.5 IN | HEART RATE: 103 BPM | SYSTOLIC BLOOD PRESSURE: 131 MMHG | DIASTOLIC BLOOD PRESSURE: 66 MMHG | TEMPERATURE: 98 F | RESPIRATION RATE: 16 BRPM

## 2018-01-11 DIAGNOSIS — Z51.11 CHEMOTHERAPY MANAGEMENT, ENCOUNTER FOR: ICD-10-CM

## 2018-01-11 DIAGNOSIS — F41.9 ANXIETY: ICD-10-CM

## 2018-01-11 DIAGNOSIS — K21.9 GASTROESOPHAGEAL REFLUX DISEASE, ESOPHAGITIS PRESENCE NOT SPECIFIED: ICD-10-CM

## 2018-01-11 DIAGNOSIS — B19.20 HEPATITIS C VIRUS INFECTION WITHOUT HEPATIC COMA, UNSPECIFIED CHRONICITY: ICD-10-CM

## 2018-01-11 DIAGNOSIS — I48.92 ATRIAL FLUTTER, UNSPECIFIED TYPE (HCC): ICD-10-CM

## 2018-01-11 DIAGNOSIS — C25.1 MALIGNANT NEOPLASM OF BODY OF PANCREAS (HCC): Primary | ICD-10-CM

## 2018-01-11 PROCEDURE — 99215 OFFICE O/P EST HI 40 MIN: CPT | Performed by: INTERNAL MEDICINE

## 2018-01-11 PROCEDURE — G0463 HOSPITAL OUTPT CLINIC VISIT: HCPCS | Performed by: INTERNAL MEDICINE

## 2018-01-11 NOTE — PROGRESS NOTES
Chemotherapy Education    Learner:  Patient    Barriers / Limitations:  Emotional factors    Chemotherapy education goals:  · Learn the drug names  · Administration schedule  · Routes of administration  · Treatment setting: if treatment is received in a se extremity swelling, palpitations or dizziness develops: N/A    Comments:    Treatment Effects on Neurological System:    Potential numbness / tingling in hands or feet:  Achieved    Notify MD/RN of any numbness / tingling at next visit:  Achieved    Antonia treatment. Patient Signature:       Date:    Nurse Signature:       Date:      Cancer treatment education, including treatment plan, supportive medications, and post-treatment care was provided to the patient.   The patient/support person  was attentive

## 2018-01-12 ENCOUNTER — APPOINTMENT (OUTPATIENT)
Dept: HEMATOLOGY/ONCOLOGY | Facility: HOSPITAL | Age: 73
End: 2018-01-12
Attending: INTERNAL MEDICINE
Payer: MEDICARE

## 2018-01-12 NOTE — PROGRESS NOTES
[facility]  Hematology/Oncology  Progress Note    [facility] CSN: [id number]    1945 MRN [id number]   Location [address] Attending Annette Rodriguez MD   Date of Visit 18 PCP Ashley Trinidad MD     Chief Complaint: Re Thursday, complicated by a pneumothorax and is currently recovering with mild dyspnea. She was incidentally screened for hepatitis C and found to be positive.  [place] underwent surgical pancreatic resection on 7/217 at [facility] by Dr. Berto Sousa after TONSILLECTOMY      Comment: <age 17 years    Family History   Problem Relation Age of Onset   • Cancer Mother 76     colon cancer   • Other [OTHER] Mother      celiac sprue   • Clotting Disorder Sister 25     recurrent DVT's   • Bleeding Disorders Neg problems and weakness. A comprehensive 14 point review of systems was completed. Pertinent positives and negatives noted in the the HPI.      Physical Exam:    Blood pressure 131/66, pulse 103, temperature 97.8 °F (36.6 °C), temperature source Oral, re External Cream Apply to site 1 hour prior to port a cath needle insertion Disp: 1 Tube Rfl: 1   GEMCITABINE HCL IV Inject into the vein. Disp:  Rfl:    mirtazapine 15 MG Oral Tab Take 1 tablet (15 mg total) by mouth nightly.  Disp: 90 tablet Rfl: 1   aspiri tip terminates in the SVC. No significant axillary adenopathy. Bilateral breast implants present. Calcified and noncalcified right thyroid nodules are redemonstrated. LUNGS:             There is a background of emphysema. No focal airspace consolidation. but no aneurysm. Ferne Vish LYMPH NODES:            No significantly enlarged lymph nodes. BLADDER:        Moderately distended. PELVIC ORGANS:         No visible mass. BONES:             Multilevel degenerative changes of the visualized spine.  [address] lymph node involvement [id number], stage IIB s/p cycle 1 of FOLFIRINOX on 4/24/17 with neulasta given on day 3 presents for follow up evaluation s/p extended hospital course related to significant diarrhea which then led to extensive electrolyte loss which th adjuvant setting  --Discussed side effect profiles of both agents; capecitabine and gemcitabine in detail.  Patient has now consented to treatment at this time and is nervous about more chemotherapy, but is interested in receiving it based on improvements i Anxiety/depressive symptoms    --Recommended patient to behavioral health to help with the anxiety components related to her illness as she had lots of stress and fear related to proceeding with surgery, having additional chemotherapy, and her diagnosis of

## 2018-01-17 RX ORDER — METOCLOPRAMIDE HYDROCHLORIDE 5 MG/ML
10 INJECTION INTRAMUSCULAR; INTRAVENOUS EVERY 6 HOURS PRN
Status: CANCELLED | OUTPATIENT
Start: 2018-01-25

## 2018-01-17 RX ORDER — PROCHLORPERAZINE MALEATE 10 MG
10 TABLET ORAL EVERY 6 HOURS PRN
Status: CANCELLED | OUTPATIENT
Start: 2018-02-01

## 2018-01-17 RX ORDER — LORAZEPAM 1 MG/1
TABLET ORAL EVERY 4 HOURS PRN
Status: CANCELLED | OUTPATIENT
Start: 2018-01-18

## 2018-01-17 RX ORDER — LORAZEPAM 2 MG/ML
INJECTION INTRAMUSCULAR EVERY 4 HOURS PRN
Status: CANCELLED | OUTPATIENT
Start: 2018-01-25

## 2018-01-17 RX ORDER — METOCLOPRAMIDE HYDROCHLORIDE 5 MG/ML
10 INJECTION INTRAMUSCULAR; INTRAVENOUS EVERY 6 HOURS PRN
Status: CANCELLED | OUTPATIENT
Start: 2018-02-01

## 2018-01-17 RX ORDER — LORAZEPAM 2 MG/ML
INJECTION INTRAMUSCULAR EVERY 4 HOURS PRN
Status: CANCELLED | OUTPATIENT
Start: 2018-01-18

## 2018-01-17 RX ORDER — LORAZEPAM 1 MG/1
TABLET ORAL EVERY 4 HOURS PRN
Status: CANCELLED | OUTPATIENT
Start: 2018-01-25

## 2018-01-17 RX ORDER — LORAZEPAM 1 MG/1
TABLET ORAL EVERY 4 HOURS PRN
Status: CANCELLED | OUTPATIENT
Start: 2018-02-01

## 2018-01-17 RX ORDER — METOCLOPRAMIDE HYDROCHLORIDE 5 MG/ML
10 INJECTION INTRAMUSCULAR; INTRAVENOUS EVERY 6 HOURS PRN
Status: CANCELLED | OUTPATIENT
Start: 2018-01-18

## 2018-01-17 RX ORDER — PROCHLORPERAZINE MALEATE 10 MG
10 TABLET ORAL EVERY 6 HOURS PRN
Status: CANCELLED | OUTPATIENT
Start: 2018-01-18

## 2018-01-17 RX ORDER — PROCHLORPERAZINE MALEATE 10 MG
10 TABLET ORAL EVERY 6 HOURS PRN
Status: CANCELLED | OUTPATIENT
Start: 2018-01-25

## 2018-01-17 RX ORDER — LORAZEPAM 2 MG/ML
INJECTION INTRAMUSCULAR EVERY 4 HOURS PRN
Status: CANCELLED | OUTPATIENT
Start: 2018-02-01

## 2018-01-18 ENCOUNTER — OFFICE VISIT (OUTPATIENT)
Dept: HEMATOLOGY/ONCOLOGY | Facility: HOSPITAL | Age: 73
End: 2018-01-18
Attending: INTERNAL MEDICINE
Payer: MEDICARE

## 2018-01-18 VITALS
HEART RATE: 84 BPM | DIASTOLIC BLOOD PRESSURE: 61 MMHG | TEMPERATURE: 98 F | RESPIRATION RATE: 16 BRPM | SYSTOLIC BLOOD PRESSURE: 133 MMHG

## 2018-01-18 DIAGNOSIS — C25.1 MALIGNANT NEOPLASM OF BODY OF PANCREAS (HCC): Primary | ICD-10-CM

## 2018-01-18 DIAGNOSIS — E04.1 THYROID NODULE: ICD-10-CM

## 2018-01-18 DIAGNOSIS — Z51.11 CHEMOTHERAPY MANAGEMENT, ENCOUNTER FOR: ICD-10-CM

## 2018-01-18 DIAGNOSIS — E53.8 VITAMIN B 12 DEFICIENCY: ICD-10-CM

## 2018-01-18 LAB
ALBUMIN SERPL BCP-MCNC: 3.6 G/DL (ref 3.5–4.8)
ALBUMIN/GLOB SERPL: 1.4 {RATIO} (ref 1–2)
ALP SERPL-CCNC: 63 U/L (ref 32–100)
ALT SERPL-CCNC: 14 U/L (ref 14–54)
ANION GAP SERPL CALC-SCNC: 8 MMOL/L (ref 0–18)
AST SERPL-CCNC: 24 U/L (ref 15–41)
BASOPHILS # BLD: 0.2 K/UL (ref 0–0.2)
BASOPHILS NFR BLD: 2 %
BILIRUB SERPL-MCNC: 0.4 MG/DL (ref 0.3–1.2)
BUN SERPL-MCNC: 19 MG/DL (ref 8–20)
BUN/CREAT SERPL: 26.8 (ref 10–20)
CALCIUM SERPL-MCNC: 9.5 MG/DL (ref 8.5–10.5)
CHLORIDE SERPL-SCNC: 105 MMOL/L (ref 95–110)
CO2 SERPL-SCNC: 24 MMOL/L (ref 22–32)
CREAT SERPL-MCNC: 0.71 MG/DL (ref 0.5–1.5)
EOSINOPHIL # BLD: 0.1 K/UL (ref 0–0.7)
EOSINOPHIL NFR BLD: 1 %
ERYTHROCYTE [DISTWIDTH] IN BLOOD BY AUTOMATED COUNT: 22.1 % (ref 11–15)
GLOBULIN PLAS-MCNC: 2.5 G/DL (ref 2.5–3.7)
GLUCOSE SERPL-MCNC: 162 MG/DL (ref 70–99)
HCT VFR BLD AUTO: 35.7 % (ref 35–48)
HGB BLD-MCNC: 11.3 G/DL (ref 12–16)
LYMPHOCYTES # BLD: 4.9 K/UL (ref 1–4)
LYMPHOCYTES NFR BLD: 44 %
MCH RBC QN AUTO: 31.5 PG (ref 27–32)
MCHC RBC AUTO-ENTMCNC: 31.7 G/DL (ref 32–37)
MCV RBC AUTO: 99.2 FL (ref 80–100)
MONOCYTES # BLD: 1.6 K/UL (ref 0–1)
MONOCYTES NFR BLD: 14 %
NEUTROPHILS # BLD AUTO: 4.4 K/UL (ref 1.8–7.7)
NEUTROPHILS NFR BLD: 39 %
OSMOLALITY UR CALC.SUM OF ELEC: 290 MOSM/KG (ref 275–295)
PLATELET # BLD AUTO: 294 K/UL (ref 140–400)
PMV BLD AUTO: 10.6 FL (ref 7.4–10.3)
POTASSIUM SERPL-SCNC: 4.1 MMOL/L (ref 3.3–5.1)
PROT SERPL-MCNC: 6.1 G/DL (ref 5.9–8.4)
RBC # BLD AUTO: 3.6 M/UL (ref 3.7–5.4)
SODIUM SERPL-SCNC: 137 MMOL/L (ref 136–144)
T4 FREE SERPL-MCNC: 0.83 NG/DL (ref 0.58–1.64)
TSH SERPL-ACNC: 1.08 UIU/ML (ref 0.45–5.33)
VIT B12 SERPL-MCNC: 1266 PG/ML (ref 181–914)
WBC # BLD AUTO: 11.2 K/UL (ref 4–11)

## 2018-01-18 PROCEDURE — 85025 COMPLETE CBC W/AUTO DIFF WBC: CPT

## 2018-01-18 PROCEDURE — 96417 CHEMO IV INFUS EACH ADDL SEQ: CPT

## 2018-01-18 PROCEDURE — 96375 TX/PRO/DX INJ NEW DRUG ADDON: CPT

## 2018-01-18 PROCEDURE — 84443 ASSAY THYROID STIM HORMONE: CPT

## 2018-01-18 PROCEDURE — A4216 STERILE WATER/SALINE, 10 ML: HCPCS

## 2018-01-18 PROCEDURE — 82607 VITAMIN B-12: CPT

## 2018-01-18 PROCEDURE — 86301 IMMUNOASSAY TUMOR CA 19-9: CPT

## 2018-01-18 PROCEDURE — 96413 CHEMO IV INFUSION 1 HR: CPT

## 2018-01-18 PROCEDURE — 36415 COLL VENOUS BLD VENIPUNCTURE: CPT

## 2018-01-18 PROCEDURE — 84439 ASSAY OF FREE THYROXINE: CPT

## 2018-01-18 PROCEDURE — 80053 COMPREHEN METABOLIC PANEL: CPT

## 2018-01-18 RX ORDER — 0.9 % SODIUM CHLORIDE 0.9 %
VIAL (ML) INJECTION
Status: DISCONTINUED
Start: 2018-01-18 | End: 2018-01-18

## 2018-01-18 RX ORDER — 0.9 % SODIUM CHLORIDE 0.9 %
10 VIAL (ML) INJECTION ONCE
Status: CANCELLED | OUTPATIENT
Start: 2018-01-18

## 2018-01-18 RX ORDER — HEPARIN SODIUM (PORCINE) LOCK FLUSH IV SOLN 100 UNIT/ML 100 UNIT/ML
5 SOLUTION INTRAVENOUS ONCE
Status: CANCELLED | OUTPATIENT
Start: 2018-01-18

## 2018-01-18 RX ORDER — HEPARIN SODIUM (PORCINE) LOCK FLUSH IV SOLN 100 UNIT/ML 100 UNIT/ML
SOLUTION INTRAVENOUS
Status: COMPLETED
Start: 2018-01-18 | End: 2018-01-18

## 2018-01-18 RX ORDER — SODIUM CHLORIDE 9 MG/ML
INJECTION, SOLUTION INTRAVENOUS
Status: DISCONTINUED
Start: 2018-01-18 | End: 2018-01-18

## 2018-01-18 RX ORDER — HEPARIN SODIUM (PORCINE) LOCK FLUSH IV SOLN 100 UNIT/ML 100 UNIT/ML
5 SOLUTION INTRAVENOUS ONCE
Status: COMPLETED | OUTPATIENT
Start: 2018-01-18 | End: 2018-01-18

## 2018-01-18 RX ADMIN — HEPARIN SODIUM (PORCINE) LOCK FLUSH IV SOLN 100 UNIT/ML 500 UNITS: 100 SOLUTION INTRAVENOUS at 14:45:00

## 2018-01-18 NOTE — PROGRESS NOTES
Port accessed using sterile technique. Labs collected per order. Flushed w/ NS. Left accessed for possible treatment. Port secured w/ steri-strips and tegaderm. Appeared to tolerate well. Patient discharged to waiting room for possible chemotherapy.

## 2018-01-18 NOTE — PATIENT INSTRUCTIONS
Recommended anti-nausea medications (as directed by your provider):   Prochloperazine (Compazine) 10mg every 8 hours  Take as needed and Ondansetron (Zofran) 8 mg every 8 hours  Take as needed    When to call the doctor:  • Fever of 100.5 or greater or sh bottle wherever you go. Any fluid is acceptable, but caffeinated products do not count towards your intake and should be limited to 1-2 drinks/day.     Physical Activity  o If your doctor approves, be as physically active as you can, but start out slowly, types of infection result from your body's inability to fight off normal bacteria present in your gastrointestinal tract or skin. Bacterial infection does not commonly result from being in a crowded place.  However, types of viral infections such as colds a your hands with warm water. Make sure to rinse between your fingers and under rings. If you do not get all of the soap off, it could cause the skin to itch. Dry your hands with a soft paper towel. Pat your hands dry - rubbing might damage your skin.  If t and gravies to a boil when reheating. Do not eat raw food such as sushi, Caesar salad or milk shakes made with raw eggs, until you complete chemotherapy and your blood counts have returned to adequate levels. Keep cold foods cold.  Refrigerate or freeze may be present in vaginal fluid or semen for 48 hours after taking. A condom should be used during this time.   Effective birth control should be used throughout treatment to prevent pregnancy while on these medications and for several months or years afte

## 2018-01-18 NOTE — PROGRESS NOTES
Post Chemo Documentation    Patient is here for treatment today, Cycle 1, Day 1  Abraxane/ Gemzar    Arrives Ambulating independently                                  Modifications in dose or schedule: No      Verbalizes complaints fatigue; denies fevers. instructions: Call if she has questions start anti-emetics tonight    Reviewed symptoms to report to doctor and phone number to call also listed on AVS  Discharged to Home     Discharged Ambulating independently      [place] [id number]

## 2018-01-19 ENCOUNTER — APPOINTMENT (OUTPATIENT)
Dept: HEMATOLOGY/ONCOLOGY | Facility: HOSPITAL | Age: 73
End: 2018-01-19
Attending: INTERNAL MEDICINE
Payer: MEDICARE

## 2018-01-19 LAB — CANCER AG19-9 SERPL-ACNC: 1988 U/ML (ref 0–35)

## 2018-01-22 ENCOUNTER — TELEPHONE (OUTPATIENT)
Dept: HEMATOLOGY/ONCOLOGY | Facility: HOSPITAL | Age: 73
End: 2018-01-22

## 2018-01-22 NOTE — TELEPHONE ENCOUNTER
C1 Day 5 [place]/abraxane:  [place] reports feeling well without side effects except for minor \"bone discomfort\" and \"tingling\" in finger tips both only occurring at night. Able to sleep. Eating and drinking without difficulty and energy level fair.  No na

## 2018-01-24 ENCOUNTER — TELEPHONE (OUTPATIENT)
Dept: HEMATOLOGY/ONCOLOGY | Facility: HOSPITAL | Age: 73
End: 2018-01-24

## 2018-01-24 RX ORDER — CAPECITABINE 150 MG/1
TABLET, FILM COATED ORAL
Qty: 42 TABLET | Refills: 0 | OUTPATIENT
Start: 2018-01-24

## 2018-01-24 NOTE — TELEPHONE ENCOUNTER
Romero Ross is calling to see why the patient Capecitabine was denied.  Will this patient no longer need the medication or is the dose now lowered, please advise

## 2018-01-25 ENCOUNTER — OFFICE VISIT (OUTPATIENT)
Dept: HEMATOLOGY/ONCOLOGY | Facility: HOSPITAL | Age: 73
End: 2018-01-25
Attending: INTERNAL MEDICINE
Payer: MEDICARE

## 2018-01-25 VITALS
WEIGHT: 183 LBS | TEMPERATURE: 98 F | DIASTOLIC BLOOD PRESSURE: 67 MMHG | HEART RATE: 88 BPM | RESPIRATION RATE: 16 BRPM | SYSTOLIC BLOOD PRESSURE: 154 MMHG | HEIGHT: 66.5 IN | BODY MASS INDEX: 29.06 KG/M2

## 2018-01-25 VITALS
HEART RATE: 88 BPM | RESPIRATION RATE: 16 BRPM | SYSTOLIC BLOOD PRESSURE: 154 MMHG | TEMPERATURE: 98 F | DIASTOLIC BLOOD PRESSURE: 67 MMHG

## 2018-01-25 DIAGNOSIS — C25.1 MALIGNANT NEOPLASM OF BODY OF PANCREAS (HCC): Primary | ICD-10-CM

## 2018-01-25 DIAGNOSIS — Z51.11 CHEMOTHERAPY MANAGEMENT, ENCOUNTER FOR: ICD-10-CM

## 2018-01-25 DIAGNOSIS — R53.83 FATIGUE, UNSPECIFIED TYPE: ICD-10-CM

## 2018-01-25 DIAGNOSIS — F41.9 ANXIETY: ICD-10-CM

## 2018-01-25 DIAGNOSIS — I48.92 ATRIAL FLUTTER, UNSPECIFIED TYPE (HCC): ICD-10-CM

## 2018-01-25 LAB
BASOPHILS # BLD: 0.1 K/UL (ref 0–0.2)
BASOPHILS NFR BLD: 2 %
EOSINOPHIL # BLD: 0.1 K/UL (ref 0–0.7)
EOSINOPHIL NFR BLD: 1 %
ERYTHROCYTE [DISTWIDTH] IN BLOOD BY AUTOMATED COUNT: 20 % (ref 11–15)
HCT VFR BLD AUTO: 33.3 % (ref 35–48)
HGB BLD-MCNC: 10.7 G/DL (ref 12–16)
LYMPHOCYTES # BLD: 2.9 K/UL (ref 1–4)
LYMPHOCYTES NFR BLD: 66 %
MCH RBC QN AUTO: 31.3 PG (ref 27–32)
MCHC RBC AUTO-ENTMCNC: 32.2 G/DL (ref 32–37)
MCV RBC AUTO: 97.2 FL (ref 80–100)
MONOCYTES # BLD: 0.2 K/UL (ref 0–1)
MONOCYTES NFR BLD: 5 %
NEUTROPHILS # BLD AUTO: 1.2 K/UL (ref 1.8–7.7)
NEUTROPHILS NFR BLD: 26 %
PLATELET # BLD AUTO: 209 K/UL (ref 140–400)
PMV BLD AUTO: 12.4 FL (ref 7.4–10.3)
RBC # BLD AUTO: 3.42 M/UL (ref 3.7–5.4)
WBC # BLD AUTO: 4.4 K/UL (ref 4–11)

## 2018-01-25 PROCEDURE — 99215 OFFICE O/P EST HI 40 MIN: CPT | Performed by: INTERNAL MEDICINE

## 2018-01-25 PROCEDURE — 36592 COLLECT BLOOD FROM PICC: CPT

## 2018-01-25 PROCEDURE — 36591 DRAW BLOOD OFF VENOUS DEVICE: CPT

## 2018-01-25 PROCEDURE — A4216 STERILE WATER/SALINE, 10 ML: HCPCS

## 2018-01-25 PROCEDURE — G0463 HOSPITAL OUTPT CLINIC VISIT: HCPCS | Performed by: INTERNAL MEDICINE

## 2018-01-25 PROCEDURE — 85025 COMPLETE CBC W/AUTO DIFF WBC: CPT

## 2018-01-25 RX ORDER — 0.9 % SODIUM CHLORIDE 0.9 %
10 VIAL (ML) INJECTION ONCE
Status: CANCELLED | OUTPATIENT
Start: 2018-01-25

## 2018-01-25 RX ORDER — LORAZEPAM 1 MG/1
TABLET ORAL EVERY 4 HOURS PRN
Status: CANCELLED | OUTPATIENT
Start: 2018-02-14

## 2018-01-25 RX ORDER — HEPARIN SODIUM (PORCINE) LOCK FLUSH IV SOLN 100 UNIT/ML 100 UNIT/ML
5 SOLUTION INTRAVENOUS ONCE
Status: COMPLETED | OUTPATIENT
Start: 2018-01-25 | End: 2018-01-25

## 2018-01-25 RX ORDER — FLUOROURACIL 50 MG/ML
2400 INJECTION, SOLUTION INTRAVENOUS CONTINUOUS
Status: CANCELLED | OUTPATIENT
Start: 2018-02-14

## 2018-01-25 RX ORDER — HEPARIN SODIUM (PORCINE) LOCK FLUSH IV SOLN 100 UNIT/ML 100 UNIT/ML
5 SOLUTION INTRAVENOUS ONCE
Status: CANCELLED | OUTPATIENT
Start: 2018-01-25

## 2018-01-25 RX ORDER — 0.9 % SODIUM CHLORIDE 0.9 %
VIAL (ML) INJECTION
Status: DISCONTINUED
Start: 2018-01-25 | End: 2018-01-25

## 2018-01-25 RX ORDER — LORAZEPAM 2 MG/ML
INJECTION INTRAMUSCULAR EVERY 4 HOURS PRN
Status: CANCELLED | OUTPATIENT
Start: 2018-02-14

## 2018-01-25 RX ORDER — PROCHLORPERAZINE MALEATE 10 MG
10 TABLET ORAL EVERY 6 HOURS PRN
Status: CANCELLED | OUTPATIENT
Start: 2018-02-14

## 2018-01-25 RX ORDER — METOCLOPRAMIDE HYDROCHLORIDE 5 MG/ML
10 INJECTION INTRAMUSCULAR; INTRAVENOUS EVERY 6 HOURS PRN
Status: CANCELLED | OUTPATIENT
Start: 2018-02-14

## 2018-01-25 RX ORDER — HEPARIN SODIUM (PORCINE) LOCK FLUSH IV SOLN 100 UNIT/ML 100 UNIT/ML
SOLUTION INTRAVENOUS
Status: COMPLETED
Start: 2018-01-25 | End: 2018-01-25

## 2018-01-25 RX ADMIN — HEPARIN SODIUM (PORCINE) LOCK FLUSH IV SOLN 100 UNIT/ML 500 UNITS: 100 SOLUTION INTRAVENOUS at 13:06:00

## 2018-01-25 NOTE — PROGRESS NOTES
Patient here for blood draw from port. Ambulated from waiting room with steady gait. Patient states she is feeling well. Her taste is still not good, but offers no other complaints.   Port accessed using sterile technique- good blood return noted CBC

## 2018-01-25 NOTE — PROGRESS NOTES
Medication Education Record: IV Therapy    Learner:  Patient    Barriers / Limitations:  None    Diagnosis:   Pancreatic cancer    IV Cancer Treatment Name(s): Folfox  IV Cancer Treatment Frequency Every 2 weeks    Number of cycles planned 12  Plan for gonzalo chemotherapy  o Eat small frequent meals per day (6-7 meals) rather than 3 large meals  o Choose high calorie/high protein foods (chicken, hard cooked eggs, peanut butter, cheese)  o If nauseated, try dry foods, such as toast, crackers or pretzels; light o wearing a hat and use sunscreen. Apply alcohol-free moisturizer as needed.     When to call the doctor:  • Fever of 100.5 or greater or shaking chills  • Nausea/vomiting not controlled with anti-nausea medications: Unable to drink for 24 hours or have sign leaking. You should do this twice a day. 2. If the IV connection is leaking:  a. Put on disposable gloves  b.  Stop the pump by clamping the tubing and turning it off.  c. Cover the connection with a paper towel and a plastic bag.  d. Refer to the Chemo taking. A condom should be used during this time. Effective birth control should be used throughout treatment to prevent pregnancy while on these medications and for several months or years after therapy.   Chemotherapy can have harmful side effects to th

## 2018-01-26 NOTE — PROGRESS NOTES
[facility] AND [facility]  Hematology/Oncology  Progress Note    June Bonilla CSN: [id number]    1945 MRN [id number]   Location [address] Attending Isaias Michael MD   Date of Visit 18 PCP Hazel Ocampo MD     Chief Complaint: Re Thursday, complicated by a pneumothorax and is currently recovering with mild dyspnea. She was incidentally screened for hepatitis C and found to be positive.  [place] underwent surgical pancreatic resection on 7/217 at [facility] by Dr. Liset Norman after History   Marital status:    Spouse name: N/A    Years of education: N/A  Number of children: N/A     Occupational History  None on file     Social History Main Topics   Smoking status: Former Smoker  1.00 Packs/day  For 40.00 Years     Quit date: 2/ (183 lb). General: Patient is alert and oriented x 3, not in acute distress. Psych: Relaxed with appropriate questions and responses  HEENT: EOMs intact. Oropharynx is clear. Neck:  No palpable lymphadenopathy. Neck is supple. Lymphatics:  There is Loperamide HCl 2 MG Oral Cap Take 1 capsule (2 mg total) by mouth 4 (four) times daily as needed for Diarrhea. Disp: 30 capsule Rfl: 3   Calcium Carbonate Antacid 500 MG Oral Chew Tab Chew 1 tablet (500 mg total) by mouth 3 (three) times daily as needed. michelet. Her hospital course was further complicated by an E. coli bloodstream infection requiring extended course of antibiotics.  Patient recently underwent pancreatectomy, distal subtotal, w/wo splenectomy w/ pancreaticojejunosotomy by Dr. Arnulfo Joyce shows results are lowered but not completed within normal limits; her full body CT scan now shows TIN from a recurrence standpoint and will serve as her new baseline  --We've informed her that ESPAC-4 looked at pts who received treatment within 12 weeks of consequence of her profound electrolyte abnormalities related to her diarrhea complications from aggressive triplet regimen chemotherapy     3.) Anxiety/depressive symptoms    --Recommended patient to behavioral health to help with the anxiety components r MD [facility]  [address] [place] [id number]  [address] [facility]

## 2018-01-29 RX ORDER — RIVAROXABAN 15 MG/1
TABLET, FILM COATED ORAL
Qty: 42 TABLET | Refills: 0 | OUTPATIENT
Start: 2018-01-29

## 2018-01-30 ENCOUNTER — TELEPHONE (OUTPATIENT)
Dept: HEMATOLOGY/ONCOLOGY | Facility: HOSPITAL | Age: 73
End: 2018-01-30

## 2018-01-30 NOTE — TELEPHONE ENCOUNTER
Darryl Virk is calling to speak with Amna Graves, She said she was told to call if she didn't hear to schedule her chemo by today, please advise

## 2018-02-01 ENCOUNTER — APPOINTMENT (OUTPATIENT)
Dept: HEMATOLOGY/ONCOLOGY | Facility: HOSPITAL | Age: 73
End: 2018-02-01
Attending: INTERNAL MEDICINE
Payer: MEDICARE

## 2018-02-01 ENCOUNTER — APPOINTMENT (OUTPATIENT)
Dept: HEMATOLOGY/ONCOLOGY | Facility: HOSPITAL | Age: 73
End: 2018-02-01
Attending: PHYSICIAN ASSISTANT
Payer: MEDICARE

## 2018-02-02 ENCOUNTER — TELEPHONE (OUTPATIENT)
Dept: HEMATOLOGY/ONCOLOGY | Facility: HOSPITAL | Age: 73
End: 2018-02-02

## 2018-02-02 ENCOUNTER — NURSE ONLY (OUTPATIENT)
Dept: HEMATOLOGY/ONCOLOGY | Facility: HOSPITAL | Age: 73
End: 2018-02-02
Attending: INTERNAL MEDICINE
Payer: MEDICARE

## 2018-02-02 DIAGNOSIS — Z51.11 CHEMOTHERAPY MANAGEMENT, ENCOUNTER FOR: ICD-10-CM

## 2018-02-02 DIAGNOSIS — C25.1 MALIGNANT NEOPLASM OF BODY OF PANCREAS (HCC): Primary | ICD-10-CM

## 2018-02-02 LAB
ALBUMIN SERPL BCP-MCNC: 3.6 G/DL (ref 3.5–4.8)
ALBUMIN/GLOB SERPL: 1.6 {RATIO} (ref 1–2)
ALP SERPL-CCNC: 55 U/L (ref 32–100)
ALT SERPL-CCNC: 17 U/L (ref 14–54)
ANION GAP SERPL CALC-SCNC: 8 MMOL/L (ref 0–18)
AST SERPL-CCNC: 28 U/L (ref 15–41)
BASOPHILS # BLD: 0.1 K/UL (ref 0–0.2)
BASOPHILS NFR BLD: 1 %
BILIRUB SERPL-MCNC: 0.5 MG/DL (ref 0.3–1.2)
BUN SERPL-MCNC: 14 MG/DL (ref 8–20)
BUN/CREAT SERPL: 21.5 (ref 10–20)
CALCIUM SERPL-MCNC: 9.1 MG/DL (ref 8.5–10.5)
CHLORIDE SERPL-SCNC: 106 MMOL/L (ref 95–110)
CO2 SERPL-SCNC: 24 MMOL/L (ref 22–32)
CREAT SERPL-MCNC: 0.65 MG/DL (ref 0.5–1.5)
EOSINOPHIL # BLD: 0.1 K/UL (ref 0–0.7)
EOSINOPHIL NFR BLD: 2 %
ERYTHROCYTE [DISTWIDTH] IN BLOOD BY AUTOMATED COUNT: 19.6 % (ref 11–15)
GLOBULIN PLAS-MCNC: 2.2 G/DL (ref 2.5–3.7)
GLUCOSE SERPL-MCNC: 188 MG/DL (ref 70–99)
HCT VFR BLD AUTO: 35.3 % (ref 35–48)
HGB BLD-MCNC: 11.2 G/DL (ref 12–16)
LYMPHOCYTES # BLD: 4.5 K/UL (ref 1–4)
LYMPHOCYTES NFR BLD: 65 %
MCH RBC QN AUTO: 30.4 PG (ref 27–32)
MCHC RBC AUTO-ENTMCNC: 31.9 G/DL (ref 32–37)
MCV RBC AUTO: 95.4 FL (ref 80–100)
MONOCYTES # BLD: 1.5 K/UL (ref 0–1)
MONOCYTES NFR BLD: 22 %
NEUTROPHILS # BLD AUTO: 0.8 K/UL (ref 1.8–7.7)
NEUTROPHILS NFR BLD: 11 %
OSMOLALITY UR CALC.SUM OF ELEC: 291 MOSM/KG (ref 275–295)
PLATELET # BLD AUTO: 365 K/UL (ref 140–400)
PMV BLD AUTO: 10.4 FL (ref 7.4–10.3)
POTASSIUM SERPL-SCNC: 4 MMOL/L (ref 3.3–5.1)
PROT SERPL-MCNC: 5.8 G/DL (ref 5.9–8.4)
RBC # BLD AUTO: 3.7 M/UL (ref 3.7–5.4)
SODIUM SERPL-SCNC: 138 MMOL/L (ref 136–144)
WBC # BLD AUTO: 7 K/UL (ref 4–11)

## 2018-02-02 PROCEDURE — A4216 STERILE WATER/SALINE, 10 ML: HCPCS

## 2018-02-02 PROCEDURE — 36591 DRAW BLOOD OFF VENOUS DEVICE: CPT

## 2018-02-02 PROCEDURE — 85025 COMPLETE CBC W/AUTO DIFF WBC: CPT

## 2018-02-02 PROCEDURE — 80053 COMPREHEN METABOLIC PANEL: CPT

## 2018-02-02 RX ORDER — 0.9 % SODIUM CHLORIDE 0.9 %
VIAL (ML) INJECTION
Status: DISCONTINUED
Start: 2018-02-02 | End: 2018-02-02

## 2018-02-02 RX ORDER — HEPARIN SODIUM (PORCINE) LOCK FLUSH IV SOLN 100 UNIT/ML 100 UNIT/ML
5 SOLUTION INTRAVENOUS ONCE
Status: COMPLETED | OUTPATIENT
Start: 2018-02-02 | End: 2018-02-02

## 2018-02-02 RX ORDER — 0.9 % SODIUM CHLORIDE 0.9 %
10 VIAL (ML) INJECTION ONCE
Status: CANCELLED | OUTPATIENT
Start: 2018-02-02

## 2018-02-02 RX ORDER — HEPARIN SODIUM (PORCINE) LOCK FLUSH IV SOLN 100 UNIT/ML 100 UNIT/ML
SOLUTION INTRAVENOUS
Status: COMPLETED
Start: 2018-02-02 | End: 2018-02-02

## 2018-02-02 RX ORDER — HEPARIN SODIUM (PORCINE) LOCK FLUSH IV SOLN 100 UNIT/ML 100 UNIT/ML
5 SOLUTION INTRAVENOUS ONCE
Status: CANCELLED | OUTPATIENT
Start: 2018-02-02

## 2018-02-02 RX ADMIN — HEPARIN SODIUM (PORCINE) LOCK FLUSH IV SOLN 100 UNIT/ML 500 UNITS: 100 SOLUTION INTRAVENOUS at 12:40:00

## 2018-02-02 NOTE — PROGRESS NOTES
Right chest port accessed per sterile technique, brisk blood return noted. Labs obtained as ordered and sent. Port flushed per protocol and ashraf needle removed. Gauze and paper tape to site. Discharged ambulatory.   Return 2/5/17

## 2018-02-02 NOTE — TELEPHONE ENCOUNTER
Call to [place]. Explained that her wbc counts have not yet recovered from previous chemo. She needs additional week to recover. Infusion will call her for time set up with repeat labs , treatment for 2/12/18. [place] confirms understanding.

## 2018-02-05 ENCOUNTER — APPOINTMENT (OUTPATIENT)
Dept: HEMATOLOGY/ONCOLOGY | Facility: HOSPITAL | Age: 73
End: 2018-02-05
Attending: INTERNAL MEDICINE
Payer: MEDICARE

## 2018-02-09 ENCOUNTER — APPOINTMENT (OUTPATIENT)
Dept: HEMATOLOGY/ONCOLOGY | Facility: HOSPITAL | Age: 73
End: 2018-02-09
Attending: INTERNAL MEDICINE
Payer: MEDICARE

## 2018-02-12 ENCOUNTER — NURSE ONLY (OUTPATIENT)
Dept: HEMATOLOGY/ONCOLOGY | Facility: HOSPITAL | Age: 73
End: 2018-02-12
Attending: INTERNAL MEDICINE
Payer: MEDICARE

## 2018-02-12 ENCOUNTER — TELEPHONE (OUTPATIENT)
Dept: HEMATOLOGY/ONCOLOGY | Facility: HOSPITAL | Age: 73
End: 2018-02-12

## 2018-02-12 DIAGNOSIS — Z51.11 CHEMOTHERAPY MANAGEMENT, ENCOUNTER FOR: ICD-10-CM

## 2018-02-12 DIAGNOSIS — C25.1 MALIGNANT NEOPLASM OF BODY OF PANCREAS (HCC): Primary | ICD-10-CM

## 2018-02-12 LAB
ALBUMIN SERPL BCP-MCNC: 3.9 G/DL (ref 3.5–4.8)
ALBUMIN/GLOB SERPL: 1.3 {RATIO} (ref 1–2)
ALP SERPL-CCNC: 56 U/L (ref 32–100)
ALT SERPL-CCNC: 14 U/L (ref 14–54)
ANION GAP SERPL CALC-SCNC: 9 MMOL/L (ref 0–18)
AST SERPL-CCNC: 28 U/L (ref 15–41)
BASOPHILS # BLD: 0.2 K/UL (ref 0–0.2)
BASOPHILS NFR BLD: 2 %
BILIRUB SERPL-MCNC: 0.5 MG/DL (ref 0.3–1.2)
BUN SERPL-MCNC: 15 MG/DL (ref 8–20)
BUN/CREAT SERPL: 18.5 (ref 10–20)
CALCIUM SERPL-MCNC: 9.4 MG/DL (ref 8.5–10.5)
CHLORIDE SERPL-SCNC: 105 MMOL/L (ref 95–110)
CO2 SERPL-SCNC: 21 MMOL/L (ref 22–32)
CREAT SERPL-MCNC: 0.81 MG/DL (ref 0.5–1.5)
EOSINOPHIL # BLD: 0.1 K/UL (ref 0–0.7)
EOSINOPHIL NFR BLD: 1 %
ERYTHROCYTE [DISTWIDTH] IN BLOOD BY AUTOMATED COUNT: 19.3 % (ref 11–15)
GLOBULIN PLAS-MCNC: 2.9 G/DL (ref 2.5–3.7)
GLUCOSE SERPL-MCNC: 173 MG/DL (ref 70–99)
HCT VFR BLD AUTO: 36.8 % (ref 35–48)
HGB BLD-MCNC: 11.6 G/DL (ref 12–16)
LYMPHOCYTES # BLD: 5.2 K/UL (ref 1–4)
LYMPHOCYTES NFR BLD: 47 %
MCH RBC QN AUTO: 29.5 PG (ref 27–32)
MCHC RBC AUTO-ENTMCNC: 31.4 G/DL (ref 32–37)
MCV RBC AUTO: 93.9 FL (ref 80–100)
MONOCYTES # BLD: 1.4 K/UL (ref 0–1)
MONOCYTES NFR BLD: 13 %
NEUTROPHILS # BLD AUTO: 4.1 K/UL (ref 1.8–7.7)
NEUTROPHILS NFR BLD: 37 %
OSMOLALITY UR CALC.SUM OF ELEC: 285 MOSM/KG (ref 275–295)
PATIENT FASTING: NO
PLATELET # BLD AUTO: 445 K/UL (ref 140–400)
PMV BLD AUTO: 10.5 FL (ref 7.4–10.3)
POTASSIUM SERPL-SCNC: 3.9 MMOL/L (ref 3.3–5.1)
PROT SERPL-MCNC: 6.8 G/DL (ref 5.9–8.4)
RBC # BLD AUTO: 3.92 M/UL (ref 3.7–5.4)
SODIUM SERPL-SCNC: 135 MMOL/L (ref 136–144)
WBC # BLD AUTO: 10.9 K/UL (ref 4–11)

## 2018-02-12 PROCEDURE — 80053 COMPREHEN METABOLIC PANEL: CPT

## 2018-02-12 PROCEDURE — A4216 STERILE WATER/SALINE, 10 ML: HCPCS

## 2018-02-12 PROCEDURE — 36591 DRAW BLOOD OFF VENOUS DEVICE: CPT

## 2018-02-12 PROCEDURE — 85025 COMPLETE CBC W/AUTO DIFF WBC: CPT

## 2018-02-12 RX ORDER — HEPARIN SODIUM (PORCINE) LOCK FLUSH IV SOLN 100 UNIT/ML 100 UNIT/ML
5 SOLUTION INTRAVENOUS ONCE
Status: CANCELLED | OUTPATIENT
Start: 2018-02-12

## 2018-02-12 RX ORDER — 0.9 % SODIUM CHLORIDE 0.9 %
VIAL (ML) INJECTION
Status: DISCONTINUED
Start: 2018-02-12 | End: 2018-02-12

## 2018-02-12 RX ORDER — HEPARIN SODIUM (PORCINE) LOCK FLUSH IV SOLN 100 UNIT/ML 100 UNIT/ML
SOLUTION INTRAVENOUS
Status: DISCONTINUED
Start: 2018-02-12 | End: 2018-02-12

## 2018-02-12 RX ORDER — 0.9 % SODIUM CHLORIDE 0.9 %
10 VIAL (ML) INJECTION ONCE
Status: CANCELLED | OUTPATIENT
Start: 2018-02-12

## 2018-02-12 RX ORDER — HEPARIN SODIUM (PORCINE) LOCK FLUSH IV SOLN 100 UNIT/ML 100 UNIT/ML
5 SOLUTION INTRAVENOUS ONCE
Status: COMPLETED | OUTPATIENT
Start: 2018-02-12 | End: 2018-02-12

## 2018-02-12 RX ADMIN — HEPARIN SODIUM (PORCINE) LOCK FLUSH IV SOLN 100 UNIT/ML 500 UNITS: 100 SOLUTION INTRAVENOUS at 10:20:00

## 2018-02-12 NOTE — TELEPHONE ENCOUNTER
JANEL: [place] wanted Rodolfo Oliveira to know that, she is having a root canal on  Monday 2/26/18 at 1:30 pm. Meera Phan also scheduled lab for the same day.  Please Advise

## 2018-02-12 NOTE — PROGRESS NOTES
Pt here for pre-chemo labs; Wednesday is her scheduled chemo infusion appt and she is aware. She reports feeling well overall. She places emla cream to area, had seran wrap on top. Removed when ready for access, area numbed sufficiently per pt.      Po

## 2018-02-12 NOTE — TELEPHONE ENCOUNTER
Patient is here now for 10:30 lab draw, she is to have a root canal Monday, is this ok as she is having chemo this week.

## 2018-02-12 NOTE — TELEPHONE ENCOUNTER
Root canal scheduled for 2/19/18, Day 6 if cycle 1 which would not be a good time for dental surgery.  We do not wish to delay chemo scheduled for 2/14/18 so per Dr Gary Harrison, patient advised to cancel the root canal and reschedule it to 2/26 when hopefully he

## 2018-02-13 ENCOUNTER — APPOINTMENT (OUTPATIENT)
Dept: HEMATOLOGY/ONCOLOGY | Facility: HOSPITAL | Age: 73
End: 2018-02-13
Attending: INTERNAL MEDICINE
Payer: MEDICARE

## 2018-02-14 ENCOUNTER — APPOINTMENT (OUTPATIENT)
Dept: HEMATOLOGY/ONCOLOGY | Facility: HOSPITAL | Age: 73
End: 2018-02-14
Attending: INTERNAL MEDICINE
Payer: MEDICARE

## 2018-02-14 VITALS
WEIGHT: 188 LBS | BODY MASS INDEX: 30 KG/M2 | DIASTOLIC BLOOD PRESSURE: 63 MMHG | SYSTOLIC BLOOD PRESSURE: 128 MMHG | HEART RATE: 83 BPM | RESPIRATION RATE: 16 BRPM | TEMPERATURE: 97 F

## 2018-02-14 DIAGNOSIS — C25.1 MALIGNANT NEOPLASM OF BODY OF PANCREAS (HCC): Primary | ICD-10-CM

## 2018-02-14 PROCEDURE — 96375 TX/PRO/DX INJ NEW DRUG ADDON: CPT

## 2018-02-14 PROCEDURE — 96368 THER/DIAG CONCURRENT INF: CPT

## 2018-02-14 PROCEDURE — 96413 CHEMO IV INFUSION 1 HR: CPT

## 2018-02-14 PROCEDURE — A4216 STERILE WATER/SALINE, 10 ML: HCPCS

## 2018-02-14 PROCEDURE — 96415 CHEMO IV INFUSION ADDL HR: CPT

## 2018-02-14 RX ORDER — DEXTROSE MONOHYDRATE 50 MG/ML
INJECTION, SOLUTION INTRAVENOUS
Status: DISCONTINUED
Start: 2018-02-14 | End: 2018-02-14

## 2018-02-14 RX ORDER — FLUOROURACIL 50 MG/ML
2400 INJECTION, SOLUTION INTRAVENOUS CONTINUOUS
Status: DISCONTINUED | OUTPATIENT
Start: 2018-02-14 | End: 2018-02-14

## 2018-02-14 RX ORDER — 0.9 % SODIUM CHLORIDE 0.9 %
VIAL (ML) INJECTION
Status: DISCONTINUED
Start: 2018-02-14 | End: 2018-02-14

## 2018-02-14 RX ADMIN — FLUOROURACIL 4650 MG: 50 INJECTION, SOLUTION INTRAVENOUS at 13:46:00

## 2018-02-14 NOTE — PROGRESS NOTES
Post Chemo Documentation    Patient is here for treatment today, Cycle 1, Day 1      Arrives Ambulating independently      Accompanied by  Self                                Modifications in dose or schedule: No      Verbalizes complaints -offer no compla symptoms to report to doctor and phone number to call fever,     Discharged to Home     Discharged Ambulating independently    Accompanied by: Other self

## 2018-02-15 ENCOUNTER — APPOINTMENT (OUTPATIENT)
Dept: HEMATOLOGY/ONCOLOGY | Facility: HOSPITAL | Age: 73
End: 2018-02-15
Attending: INTERNAL MEDICINE
Payer: MEDICARE

## 2018-02-16 ENCOUNTER — APPOINTMENT (OUTPATIENT)
Dept: HEMATOLOGY/ONCOLOGY | Facility: HOSPITAL | Age: 73
End: 2018-02-16
Attending: INTERNAL MEDICINE
Payer: MEDICARE

## 2018-02-16 DIAGNOSIS — C25.1 MALIGNANT NEOPLASM OF BODY OF PANCREAS (HCC): ICD-10-CM

## 2018-02-16 DIAGNOSIS — Z51.11 CHEMOTHERAPY MANAGEMENT, ENCOUNTER FOR: Primary | ICD-10-CM

## 2018-02-16 PROCEDURE — 96523 IRRIG DRUG DELIVERY DEVICE: CPT

## 2018-02-16 PROCEDURE — A4216 STERILE WATER/SALINE, 10 ML: HCPCS

## 2018-02-16 RX ORDER — 0.9 % SODIUM CHLORIDE 0.9 %
10 VIAL (ML) INJECTION ONCE
Status: CANCELLED | OUTPATIENT
Start: 2018-02-16

## 2018-02-16 RX ORDER — HEPARIN SODIUM (PORCINE) LOCK FLUSH IV SOLN 100 UNIT/ML 100 UNIT/ML
5 SOLUTION INTRAVENOUS ONCE
Status: COMPLETED | OUTPATIENT
Start: 2018-02-16 | End: 2018-02-16

## 2018-02-16 RX ORDER — 0.9 % SODIUM CHLORIDE 0.9 %
VIAL (ML) INJECTION
Status: COMPLETED
Start: 2018-02-16 | End: 2018-02-16

## 2018-02-16 RX ORDER — HEPARIN SODIUM (PORCINE) LOCK FLUSH IV SOLN 100 UNIT/ML 100 UNIT/ML
5 SOLUTION INTRAVENOUS ONCE
Status: CANCELLED | OUTPATIENT
Start: 2018-02-16

## 2018-02-16 RX ORDER — 0.9 % SODIUM CHLORIDE 0.9 %
10 VIAL (ML) INJECTION ONCE
Status: COMPLETED | OUTPATIENT
Start: 2018-02-16 | End: 2018-02-16

## 2018-02-16 RX ORDER — HEPARIN SODIUM (PORCINE) LOCK FLUSH IV SOLN 100 UNIT/ML 100 UNIT/ML
SOLUTION INTRAVENOUS
Status: COMPLETED
Start: 2018-02-16 | End: 2018-02-16

## 2018-02-16 RX ADMIN — 0.9 % SODIUM CHLORIDE 10 ML: 0.9 % VIAL (ML) INJECTION at 13:18:00

## 2018-02-16 RX ADMIN — HEPARIN SODIUM (PORCINE) LOCK FLUSH IV SOLN 100 UNIT/ML 500 UNITS: 100 SOLUTION INTRAVENOUS at 13:19:00

## 2018-02-16 NOTE — PROGRESS NOTES
Patient presented with CADD pump connected. Reservoir empty. Disconnected CADD pump, flushed port with 0.9% Normal Saline and established blood return in port. Flushed with 500 units of Heparin and de-accessed port.  Gauze and paper tape applied to port sit

## 2018-02-22 ENCOUNTER — APPOINTMENT (OUTPATIENT)
Dept: HEMATOLOGY/ONCOLOGY | Facility: HOSPITAL | Age: 73
End: 2018-02-22
Attending: INTERNAL MEDICINE
Payer: MEDICARE

## 2018-02-23 ENCOUNTER — TELEPHONE (OUTPATIENT)
Dept: HEMATOLOGY/ONCOLOGY | Facility: HOSPITAL | Age: 73
End: 2018-02-23

## 2018-02-23 NOTE — TELEPHONE ENCOUNTER
Received call from oncologist Katherine who works with Dr Angel Tillman at [facility], she states she just saw [place] in clinic and wants to update Dr Isha Recio that patient has mouth sores. She asked if she's receiving 5fu bolus, I let her know she is not.   She asked f

## 2018-02-27 NOTE — PROGRESS NOTES
Patient here for FT lab followed by MD visit. Right chest port accessed per sterile technique, brisk blood return noted. Labs obtained as ordered and sent. Port flushed per protocol and ashraf needle removed. Gauze and paper tape to site.   Discharged irma

## 2018-02-27 NOTE — PROGRESS NOTES
[facility]  Hematology/Oncology  Progress Note    Christine Lamas CSN: [id number]    1945 MRN [id number]   Location [address] [facility] Attending Maki Nelson MD   Date of Visit 18 PCP Adonay Goodman MD     Chief Complaint: R Thursday, complicated by a pneumothorax and is currently recovering with mild dyspnea. She was incidentally screened for hepatitis C and found to be positive.  [place] underwent surgical pancreatic resection on 7/217 at [facility] by Dr. Liset Norman after Relation Age of Onset   • Cancer Mother 76     colon cancer   • Other [OTHER] Mother      celiac sprue   • Clotting Disorder Sister 25     recurrent DVT's   • Bleeding Disorders Neg        Social History  Social History   Marital status:    Spouse na neuropathy    A comprehensive 14 point review of systems was completed. Pertinent positives and negatives noted in the the HPI. Physical Exam:    Blood pressure 121/65, pulse 70, temperature 97.8 °F (36.6 °C), temperature source Oral, resp.  rate 16, h Apply to site 1 hour prior to port a cath needle insertion Disp: 1 Tube Rfl: 1   mirtazapine 15 MG Oral Tab Take 1 tablet (15 mg total) by mouth nightly. Disp: 90 tablet Rfl: 1   aspirin 81 MG Oral Tab Take 81 mg by mouth every other day.    Disp: 90 tablet involuting collection or fat necrosis given   the presence of macroscopic fat within these nodular foci on the prior study. Peritoneal metastatic disease could also have this appearance.   4. Worsening biliary ductal dilatation, suspicious for progression o hospital course following treatment making additional chemotherapy sessions unavailable  --Patient's tumor marker has decreased from 142.5 prior to cycle 1 of systemic treatment to 85   --CT scan in 6/2017 showed disease localized to the pancreatic body; n new mets with two omental nodules and R lung lesion increased in sized,    --Now in light of new metastatic disease, initially switched to gem/nab paclitaxel; now stage IV and only given C1D1  --Patient was switched to FOLFOX chemotherapy for stage IV dise regimen  --Increased anxiety as expected in light of new evidence supporting metastatic pancreatic cancer; improved    4.) Hepatitis C    --Patient was screened based on routine health maintenance recommendations found to be positive no evidence of symptom counseling the patient and/or on coordination of care. The diagnosis, prognosis, and general treatment was explained to the patient and the family. Gina Roberts MD  [facility]  [address]   [place]

## 2018-02-28 NOTE — PROGRESS NOTES
Post Chemo Documentation     Patient is here for treatment today, Cycle 2, Day 1 FOLFOX. Arrives Ambulating independently                                 Modifications in dose or schedule: No      Verbalizes no complaints at this time.  Reports having

## 2018-03-13 NOTE — PATIENT INSTRUCTIONS
1.  Defer treatment for 1 week due to [address]    2. Monitor for fever - call if 100.5 or above - or if cold symptoms develop    3. Monitor nerve pain    4.   Follow-up in 3 weeks

## 2018-03-15 PROBLEM — D70.9 NEUTROPENIA (HCC): Status: ACTIVE | Noted: 2018-01-01

## 2018-03-15 NOTE — PROGRESS NOTES
[facility] AND [facility]  Hematology/Oncology  Progress Note     CSN: [id number]    1945 MRN [id number]   Location [address] [facility] Attending Gina Roberts MD   Date of Visit 18 PCP Hunter Rivera MD     Chief Complaint: R Thursday, complicated by a pneumothorax and is currently recovering with mild dyspnea. She was incidentally screened for hepatitis C and found to be positive.  [place] underwent surgical pancreatic resection on 7/217 at [facility] by Dr. Kathy Gonzalez after cancer   • Other [OTHER] Mother      celiac sprue   • Clotting Disorder Sister 25     recurrent DVT's   • Bleeding Disorders Neg        Social History  Social History   Marital status:    Spouse name: N/A    Years of education: N/A  Number of childre positives and negatives noted in the the HPI. Physical Exam:    Blood pressure 128/65, pulse 69, temperature 98 °F (36.7 °C), temperature source Oral, resp. rate 18, height 1.689 m (5' 6.5\"), weight 85.7 kg (189 lb).     General: Patient is alert and o Take 81 mg by mouth every other day. Disp: 90 tablet Rfl: 3   amiodarone HCl 200 MG Oral Tab Take 1 tablet (200 mg total) by mouth every other day.  Disp: 15 tablet Rfl: 11   Loperamide HCl 2 MG Oral Cap Take 1 capsule (2 mg total) by mouth 4 (four) times w/wo splenectomy w/ pancreaticojejunosotomy by Dr. Randi Parra at [facility] on 7/12/17. Now found to have stage IV disease.      Plan:    1.) Pancreatic Cancer- resectable disease; s/p surgical resection on 7/12/17; pyT2N2; now stage IV disease with omental ESPAC-4 looked at pts who received treatment within 12 weeks of surgery and that extended delays in treating pancreatic cancer can lead to potential tumor growth.      --Pt was STOPPED from proceeding with cycle 5 of capecitabine+gemcitabine ( initiation of progression at this time      2.) Atrial Flutter    --Pt's heart rate improved; now only on amiodarone, managed by cardiology   --Atrial flutter cardiac symptoms are likely a consequence of her profound electrolyte abnormalities related to her diarrhea com need to consider gabapentin 300 mg qHS for treatment if still present at follow up    9.) Stomatitis - resolved    I spent 40 minutes face to face with the patient.   More than 50% of that time was spent counseling the patient and/or on coordination of care

## 2018-03-22 NOTE — PATIENT INSTRUCTIONS
Recommended anti-nausea medications (as directed by your provider):   Prochloperazine (Compazine) 10mg every 8 hours  Take as needed and Ondansetron (Zofran) 8 mg every 8 hours  Take as needed     When to call the doctor:  · Fever of 100.5 or greater or sh liquid a day and take a water bottle wherever you go.   Any fluid is acceptable, but caffeinated products do not count towards your intake and should be limited to 1-2 drinks/day.     Physical Activity  · If your doctor approves, be as physically active as cells are low. Most bacterial types of infection result from your body's inability to fight off normal bacteria present in your gastrointestinal tract or skin. Bacterial infection does not commonly result from being in a crowded place.  However, types of vi remove all of the germs that can cause infection. · Rinse your hands with warm water. Make sure to rinse between your fingers and under rings. If you do not get all of the soap off, it could cause the skin to itch.    · Dry your hands with a soft paper to the juices are no longer clear. Cook eggs until they are firm. Bring sauces, soups, and gravies to a boil when reheating.    · Do not eat raw food such as sushi, Caesar salad or milk shakes made with raw eggs, until you complete chemotherapy and your blood activity is safe while throughout treatment. It is possible that traces of the oral chemotherapy may be present in vaginal fluid or semen for 48 hours after taking. A condom should be used during this time.   Effective birth control should be used through

## 2018-03-22 NOTE — PROGRESS NOTES
Post Chemo Documentation     Patient is here for treatment today, Cycle 3, Day 1 FOLFOX. ANC 1.4    Arrives Ambulating independently                                 Modifications in dose or schedule: No      Verbalizes no complaints at this time.  Reports

## 2018-03-24 NOTE — PATIENT INSTRUCTIONS
On-body Injector for Neulasta Patient Instructions       Your On-Body Injection device was applied on this day:  March 24, Sat at this time 1pm    Your dose of medication will start on this day:  March 25, at [facility]

## 2018-04-03 NOTE — PROGRESS NOTES
[facility]  Hematology/Oncology  Progress Note    Courtney Hensley CSN: [id number]    1945 MRN [id number]   Location [address] [facility] Attending Bunny Roberts MD   Date of Visit 18 PCP Sonia Childress MD     Chief Complaint: R Thursday, complicated by a pneumothorax and is currently recovering with mild dyspnea. She was incidentally screened for hepatitis C and found to be positive.  [place] underwent surgical pancreatic resection on 7/217 at [facility] by Dr. Marisol Rios after 24     recurrent DVT's   • Bleeding Disorders Neg        Social History  Social History   Marital status:    Spouse name: N/A    Years of education: N/A  Number of children: N/A     Occupational History  None on file     Social History Main Topics temperature (!) 97.5 °F (36.4 °C), temperature source Oral, resp. rate 16, height 1.689 m (5' 6.5\"), weight 86.2 kg (190 lb). General: Patient is alert and oriented x 3, not in acute distress.    Psych: Relaxed with appropriate questions and responses before breakfast. Disp: 30 tablet Rfl: 0   Polyethylene Glycol 3350 Oral Powd Pack Take by mouth. Disp:  Rfl:    Vitamin B-12 1000 MCG Oral Tab Take 1,000 mcg by mouth daily.  Disp:  Rfl:    lidocaine-prilocaine 2.5-2.5 % External Cream Apply to site 1 hour Her hospital course was further complicated by an E. coli bloodstream infection requiring extended course of antibiotics.  Patient recently underwent pancreatectomy, distal subtotal, w/wo splenectomy w/ pancreaticojejunosotomy by Dr. Yumiko Boyd at [facility] results are lowered but not completed within normal limits; her full body CT scan showed TIN from a recurrence standpoint and will serve as her new baseline  --We've informed her that ESPAC-4 looked at pts who received treatment within 12 weeks of surgery based on her cold induced neuropathy symptoms with Cycle 2 moving forward)    --Planning to check tumor marker() following every 2 cycles of chemotherapy; planned for 6 cycles.     --We will plan to repeat CT scan following after this 4 cycles of chem infusional 5-FU at this time. Pt has already had removal of 5-FU bolus dose from the start of FOLFOX therapy. 8.) Cold induced neuropathy/chemotherapy induced neuropathy - stable    --Dose reduced oxaliplatin per above to 65 mg/m2.  Will likely need to d

## 2018-04-03 NOTE — PROGRESS NOTES
Right chest port accessed for Lab draw today. Brisk blood return noted. Labs obtained as ordered and sent. Port flushed with 20 ml ns followed by 500 units of heparin and ashraf needle removed. Gauze and paper tape to site.   Discharged back to lobby for

## 2018-04-04 NOTE — PROGRESS NOTES
Post Chemo Documentation    Patient is here for treatment today, Cycle 4, Day 1.       Arrives Ambulating independently      Accompanied by Other self                                 Modifications in dose or schedule: No      Verbalizes complaints  Cold [place] [phone]    Discharged to Home     Discharged Ambulating independently    Accompanied by: Other self

## 2018-04-06 NOTE — PATIENT INSTRUCTIONS
On-body Injector for Neulasta Patient Instructions       Your On-Body Injection device was applied on this day:  4/6/18 at this time 1125    Your dose of medication will start on this day: 4/7/18 at this time 2pm

## 2018-04-13 NOTE — PROGRESS NOTES
Patient arrives for port access prior to CT scan. Port accessed using sterile technique, positive blood return noted. Patient educated that she should return to have port de accessed, she verbalized understanding.

## 2018-04-13 NOTE — PROGRESS NOTES
Patient returned at [id number] [phone] [id number], states CT complete. Port flushed with 10 ml ns followed by 500 units of heparin and ashraf needle removed. Gauze and paper tape to site. Discharged ambulatory.   Return 4/18/18 for MD visit

## 2018-04-16 NOTE — PROGRESS NOTES
Patient presented with CADD pump connected. Reservoir empty. Disconnected CADD pump, flushed port with 0.9% Normal Saline and established blood return in port. Flushed with 500 units of Heparin and de-accessed port.  Gauze and paper tape applied to port sit Subjective:      Roque Nguyen is a 65 y.o. male who presents with Seizure (6 month follow up)          HPI   No concern for seizures.  Last known seizure was March 2012.     No events of loss of awareness, or significant forgetfulness or expressive aphasia.     He has no real health concerns except he is trying to lose weight.  He has been busy at home, active.  He is trying to watch his diet.     Wife with significant health concerns.  Their oldest son has moved out and their younger is working in a kitchen.     Embolic stroke:  Stable with no concerns.  Doing well with coumadin and INR is routinely checked.  Denies an sensory changes, headaches, or weakness.     1/26/2018  ICTAL AND/OR INTERICTAL FINDINGS:   Frequent left temporal sharps and intermittent left temporal slowing noted. No clinical events or seizures were reported or recorded during the study.     Needs DMV form completed today.    Current Outpatient Prescriptions   Medication Sig Dispense Refill   • lacosamide (VIMPAT) 200 MG Tab tablet Take 200 mg by mouth 2 Times a Day for 181 days. 180 Tab 1   • lisinopril (PRINIVIL) 10 MG Tab Take 1 Tab by mouth every day. 90 Tab 3   • carvedilol (COREG) 12.5 MG Tab Take 1 Tab by mouth 2 times a day, with meals. 180 Tab 3   • warfarin (COUMADIN) 1 MG Tab TAKE TWO TO THREE TABLETS BY MOUTH DAILY AS DIRECTED BY [facility] 270 Tab 1   • levetiracetam (KEPPRA) 1000 MG tablet Take 2 Tabs by mouth 2 Times a Day. 120 Tab 11   • multivitamin (THERAGRAN) Tab Take 1 Tab by mouth every day.     • calcium citrate (CALCITRATE) 950 MG Tab Take 950 mg by mouth every day.       No current facility-administered medications for this visit.        Review of Systems   Constitutional: Negative.    HENT: Negative for hearing loss, nosebleeds and sore throat.         No recent head injury.   Eyes: Negative for double vision.        No new loss of vision.   Respiratory: Negative for cough.         No recent  "lung infections.   Cardiovascular: Negative for chest pain.   Gastrointestinal: Negative for abdominal pain, diarrhea, nausea and vomiting.   Genitourinary: Negative.    Musculoskeletal: Negative.    Skin: Negative.    Neurological: Negative for seizures and headaches.   Endo/Heme/Allergies:        No history of endocrine dysfunction.  No new problems.   Psychiatric/Behavioral: Positive for memory loss (chronic and stable). Negative for depression. The patient is not nervous/anxious.         No recent mood changes.          Objective:     BP (!) 90/50   Pulse 76   Ht 1.676 m (5' 6\")   Wt 92.5 kg (204 lb)   BMI 32.93 kg/m²      Physical Exam   Constitutional: He is oriented to person, place, and time. He appears well-developed and well-nourished. No distress.   HENT:   Head: Normocephalic and atraumatic.   Nose: Nose normal.   No new hearing loss.   Eyes: EOM are normal.   No double vision or loss of vision.   Neck: Normal range of motion.   Cardiovascular: Normal rate, regular rhythm and normal heart sounds.    No recent chest pain.   Pulmonary/Chest: Effort normal and breath sounds normal.   Abdominal: There is no tenderness.   Genitourinary:   Genitourinary Comments: No recent infections.   Musculoskeletal: Normal range of motion.   Lymphadenopathy:     He has no cervical adenopathy.   Neurological: He is alert and oriented to person, place, and time. He has normal strength and normal reflexes. He displays no tremor. No cranial nerve deficit. He displays no seizure activity. Gait normal.   No observable changes in neurologic status.  See initial new patient examination for details.     Skin: Skin is warm and dry.   Psychiatric: He has a normal mood and affect. His speech is normal. His mood appears not anxious. He does not exhibit a depressed mood.   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  Last EEG on record was in 2011.     He is a primary  for the family as his 2 sons have autism and his wife is recovering from cancer treatments.    Lengthy conversation with Roque again about driving.  He is to drive on extremely limited basis, to limit highly/freeway driving.  He needs to be awake at least 1 hour prior to driving each time.     Continue Keppra 2000 mg twice a day and Vimpat 200 mg twice a day.       Continues coumadin management.     Obtain labs as ordered.      Return for follow-up care in 6 months.  I spent 35 minutes with this patient, over fifty percent was spent counseling patient on their condition, best management practices, reviewing test results and risks and benefits of treatment.     Vimpat is transmitted via [employer].

## 2018-04-18 NOTE — PROGRESS NOTES
[facility] AND [facility]  Hematology/Oncology  Progress Note    Milena Wright CSN: [id number]    1945 MRN [id number]   Location [address] [facility] Attending Sarah Campa MD   Date of Visit 18 PCP Gerry Salguero MD     Chief Complaint: Autumn Mejias by a pneumothorax and is currently recovering with mild dyspnea. She was incidentally screened for hepatitis C and found to be positive.  [place] underwent surgical pancreatic resection on 7/217 at [facility] by Dr. Maria Fernanda Humphrey after receiving 1 cycle of F Mother 76     colon cancer   • Other [OTHER] Mother      celiac sprue   • Clotting Disorder Sister 25     recurrent DVT's   • Bleeding Disorders Neg        Social History  Social History   Marital status:    Spouse name: N/A    Years of education: N/ positives and negatives noted in the the HPI. Physical Exam:    Blood pressure 116/52, pulse 77, temperature 97.7 °F (36.5 °C), temperature source Oral, resp. rate 16, height 1.689 m (5' 6.5\"), weight 86.2 kg (190 lb).     General: Patient is alert and maalox/diphenhydramine/lidocaine viscous Oral Suspension Take 5 mL by mouth 4 (four) times daily before meals and nightly.  Disp: 480 mL Rfl: 0   AMLODIPINE BESYLATE 5 MG Oral Tab TAKE 1 TABLET EVERY DAY Disp: 90 tablet Rfl: 0   Pantoprazole Sodium 40 MG unchanged from previous. 6. Mild to moderate centrilobular emphysema. 7. Colonic diverticulosis without CT evidence of acute complication. 8. Lesser incidental findings as above.          Assessment and Plan:  Patient Active Problem List:     Th cycle 1 of systemic treatment to 85   --CT scan in 6/2017 showed disease localized to the pancreatic body; no overt signs of distant metastatic disease.  She has a small 0.9 x 0.9 left para-aortic lymph node in the retroperitoneum, new from 5/2017 not [facility] medicine testing and found to have limited actionable mutations (has a MYC mutation) with clinical trials largely only in phase 1 out of state.  Pt's tumor is MSI stable, so immunotherapy treatment with pembrolizumab is not an option    --Pt has disease on CT scan.   Advised patient to be less concerned with CT scan findings as she continues to feel well clinically    4.) Hepatitis C    --Patient was screened based on routine health maintenance recommendations found to be positive no evidence of sy family. Bruce Moreno MD  [facility]  [address] [address]  [address] [place] [address], [facility], [facility]

## 2018-04-18 NOTE — PROGRESS NOTES
Post Chemo Documentation    Patient is here for treatment today, Cycle 1, Day 1 of Abraxane    Arrives Ambulating independently      Accompanied by Other self                                 Modifications in dose or schedule: No      Verbalizes complaints independently    Accompanied by: Other self

## 2018-04-25 NOTE — PATIENT INSTRUCTIONS
-Your white blood cell count is low, that is what helps your body fight infection: so please avoid sick people, avoid crowds, use good handwashing  -Call with any fever no matter what time of day if it is above 100.4, take your temperature if you have chil

## 2018-04-25 NOTE — PROGRESS NOTES
Pt arrived to infusion, her ANC is too low for treatment. Pt states that she hasn't eaten or drinken much and would benefit from fluids. 1 liter of IVF ordered. Orthostatic vitals negative.      Today's treatment was cancelled and she will keep her schedule

## 2018-04-25 NOTE — PROGRESS NOTES
[place] to lab; port accessed using sterile technique, cbc drawn and sent. Port flushed with a total of 30ml ns, capped and secured with paper tape. [place] has complaint of discomfort with swallowing.  Tried to use [employer]" with no relief;

## 2018-04-27 PROBLEM — R63.8 DECREASED ORAL INTAKE: Status: ACTIVE | Noted: 2018-01-01

## 2018-04-27 PROBLEM — I26.99 PULMONARY EMBOLI (HCC): Status: ACTIVE | Noted: 2018-01-01

## 2018-04-27 PROBLEM — B37.0 THRUSH: Status: ACTIVE | Noted: 2018-01-01

## 2018-04-27 PROBLEM — R53.1 WEAKNESS GENERALIZED: Status: ACTIVE | Noted: 2018-01-01

## 2018-04-27 PROBLEM — D70.9 NEUTROPENIA, UNSPECIFIED TYPE (HCC): Status: ACTIVE | Noted: 2018-01-01

## 2018-04-27 PROBLEM — K29.70 GASTRITIS: Status: ACTIVE | Noted: 2018-01-01

## 2018-04-27 PROBLEM — Z66 DNR (DO NOT RESUSCITATE): Status: ACTIVE | Noted: 2018-01-01

## 2018-04-27 PROBLEM — B37.0 THRUSH, ORAL: Status: ACTIVE | Noted: 2018-01-01

## 2018-04-27 NOTE — ED INITIAL ASSESSMENT (HPI)
Pt brought in from the cancer center for neutropenia, dehydration, fever, and weakness this past week. Per nurse pt just finished chemo and has mouth sores which is making it difficult for her to breath. Pt reports 2 falls yesterday due to weakness.  Pt den

## 2018-04-27 NOTE — PROGRESS NOTES
S:  81year old female presents today with her friend with c/o reduced oral intake, reduced urine output, fatigue, weakness and mouth sores x 1 week. Her symptoms started 48 hours after her first dose of abraxane for Stage IV pancreatic cancer.   She denie

## 2018-04-27 NOTE — PROGRESS NOTES
Pt to infusion area from MD office. C/o severe fatigue and sore throat. She has not been eating solid food due to her sore throat. She denies any fevers. States she did have a loose stool today. She has been drinking fluids at home and ensure.  [employer]

## 2018-04-27 NOTE — TELEPHONE ENCOUNTER
I returned [place]'s call. It went to voice mail. I asked her to please call the office so I may do a telephone assessment.

## 2018-04-27 NOTE — PATIENT INSTRUCTIONS
1.  Continue Magic Mouthwash and add nystatin 1 teaspoon swish and swallow four times daily    2. For future Magic Mouthwash refills: We need to prescribe Viscous Lidocaine solution 100 ml.   You would purchase Maalox and liquid diphenhydramine (generic b

## 2018-04-27 NOTE — ED NOTES
Pt presents to ED from cancer center for eval of generalized weakness, dehydration, multiple falls, and neutropenia. Pt arrives with right chest port accessed by RN at the clinic. Pt also received 1 liter bolus of 0.9 NS pta.  Currently denies any pain, aox

## 2018-04-27 NOTE — TELEPHONE ENCOUNTER
Toxicities: C1D1 Abraxane on 4/18/2018  Due for C1 D8 Abraxane on 4/25/2018. Treatment held due to low ANC. Pt given hydration.  Labs & C1 D15 Abraxane scheduled for 5/2/2018    Fatigue/Mucositis/Dysphagia/Anorexia/Nausea/Vomiting/Dehydration/Diarrhea    Fe She said she has staff to draw labs & provide hydration if needed.

## 2018-04-28 NOTE — PLAN OF CARE
DISCHARGE PLANNING    • Discharge to home or other facility with appropriate resources Progressing        GASTROINTESTINAL - ADULT    • Maintains or returns to baseline bowel function Progressing        PAIN - ADULT    • Verbalizes/displays adequate comfor

## 2018-04-28 NOTE — CONSULTS
IP consult to Oncology Once  Consult performed by: Immanuel Pritchard ordered by: Angelica Morse  Reason for consult: stage IV pancreatic cancer on palliative abraxane admitted with neutropenic fevers.           [facility]    Report The patient denied any urinary symptoms, dysuria, or urinary frequency/urgency. Patient denied any shortness of breath, cough, chest pain, abdominal pain. Only admits to the diarrhea, and mouth sores/soreness.     Patient was admitted she was pancultured, [COMPLETED] sodium chloride 0.9% IV bolus 1,000 mL 1,000 mL Intravenous Once Greg Pappas MD Stopped at 04/25/18 1035   [COMPLETED] Palonosetron HCl (ALOXI) 0.25 MG/5ML 0.25 mg, dexamethasone Sodium Phosphate (DECADRON) 20 mg in sodium chloride 0.9 % 11 [COMPLETED] Heparin Sodium Lock Flush 100 UNIT/ML injection 500 Units 5 mL Intercatheter Once Jerry Mcwilliams  Units at 04/03/18 8798     Current Outpatient Prescriptions on File Prior to Encounter:  nystatin [id number] UNIT/ML Mouth/Throat Suspension Ta Calcium Carbonate Antacid 500 MG Oral Chew Tab Chew 1 tablet (500 mg total) by mouth 3 (three) times daily as needed. Disp: 90 tablet Rfl: 3   Fluticasone Propionate 50 MCG/ACT Nasal Suspension by Each Nare route daily.  Disp:  Rfl:    fluorouracil 500 MG/1 [place] is  for the last 3 yrs; previously  29 yrs. She has no children; close to her stepdaughter in [address]. Her sister, Gulshan Acevedo, lives in [address]. [place] lives alone in [address].  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  Cells Few /HPF   Transitional Cells Few /HPF   WBC Urine 62 (H) 0 - 5 /HPF   RBC URINE 4 (H) 0 - 3 /HPF   Bacteria Urin Result Date: 4/27/2018  CONCLUSION:  1. Bibasilar atelectasis/scarring. Otherwise, negative for radiographically evident acute intrathoracic process. Known pulmonary nodules on recent prior chest CT are not well assessed radiographically.  2. Interval rem --Patient's tumor marker has decreased from 142.5 prior to cycle 1 of systemic treatment to 85   --CT scan in 6/2017 showed disease localized to the pancreatic body; no overt signs of distant metastatic disease.  She has a small 0.9 x 0.9 left para-aortic l --Patient was switched to palliative FOLFOX chemotherapy for stage IV disease treatment as her tumor did not respond well to prior combination gemcitabine therapy        --We've sent the patient's tumor profile for [facility] testing and found to --Likely treatment related.  Self limited and resolves with only loperamide; has not required lomotil.      4.) Goals of Care     --Reviewed the [place] POLST form and pt has elected DNR/DNI and a copy of her form is under \"media\" tabs in EPIC      Thank

## 2018-04-28 NOTE — CONSULTS
[facility] AND [facility] Infectious Disease  Report of Consultation    [place] Patient Status:  Inpatient    1945 MRN [id number]   Location [facility] 4W/SW/SE Attending Mara Conroy MD   Hosp Day # 1 PCP Johana Jon MD has quit using smokeless tobacco. She reports that she drinks alcohol. She reports that she uses drugs, including Cannabis.     Allergies:  No Known Allergies    Medications:    Current Facility-Administered Medications:   •  [COMPLETED] potassium chloride mg, 0.4 mg, Intravenous, Q2H PRN  •  [START ON 4/29/2018] amiodarone HCl (PACERONE) tab 200 mg, 200 mg, Oral, QOD  •  Maalox/diphenhydramine/lidocaine (RADIATION THERAPY MIXTURE) oral suspension 5 mL, 5 mL, Oral, TID AC    Review of Systems:    Darius normal.   Throat:  Mild injection, no obvious ulcers. Neck: Trachea ML, no masses. Lungs: CTA b/l no rhonchi, rales, wheezes. Chest wall: No tenderness or deformity. Heart: Regular rate and rhythm, normal S1S2, no murmurs. Abdomen: Soft, NT/ND. meropenem. Add acyclovir p.o. TID and eventually can consider BID suppression. Check throat culture, monospot, HSV IgM. Will follow. Ashleigh Neff [facility] Infectious Disease  [phone]    4/28/2018  1:44 PM

## 2018-04-28 NOTE — PHYSICAL THERAPY NOTE
PHYSICAL THERAPY EVALUATION - INPATIENT     Room Number: 450/450-A  Evaluation Date: 4/28/2018  Type of Evaluation: Initial  Physician Order: PT Eval and Treat    Presenting Problem: weakness,pancreatic CA  Reason for Therapy: Mobility Dysfunction and Dis ([facility]    Weakness generalized    Essential hypertension    COPD (chronic obstructive pulmonary disease) ([facility]    Anemia    Atrial flutter (HCC)    Chronic hepatitis C without hepatic coma (HCC)    Decreased oral intake    Thrush, oral    Pulmonary emboli ( '6-Clicks' INPATIENT SHORT FORM - BASIC MOBILITY  How much difficulty does the patient currently have. ..  -   Turning over in bed (including adjusting bedclothes, sheets and blankets)?: A Little   -   Sitting down on and standing up from a chair with arms #6  Current Status

## 2018-04-28 NOTE — H&P
[facility]    History and [place] Patient Status:  Inpatient    1945 MRN [id number]   Location [facility] 4W/SW/SE Attending Geovanni Kirby MD   Hosp Day # 1 PCP Damaris Millan MD     Date:  2018 Packs/day: 1.00      Years: 40.00        Quit date: 2/1/2017  Smokeless tobacco: Former User                     Alcohol use: Yes           0.0 oz/week     Comment: one per month      Allergies/Medications: one time. GEMCITABINE HCL IV Inject into the vein. Review of Systems:     Constitutional: Positive for activity change, fatigue and fever. Negative for chills and diaphoresis. HENT: Positive for mouth sores, sore throat and trouble swallowing.  Ne WBC 9.8 04/28/2018   HGB 9.5 (L) 04/28/2018   HCT 28.3 (L) 04/28/2018    04/28/2018   CREATSERUM 0.69 04/28/2018   BUN 7 (L) 04/28/2018    (L) 04/28/2018   K 3.0 (L) 04/28/2018    04/28/2018   CO2 21 (L) 04/28/2018    (H) 04/28/ continue current IV hydration. We'll get physical therapy evaluation to assess safety status. Chemotherapy, currently on hold pending resolution of patient's symptoms. Oncology is managing.     Continue with Mycostatin and stomatitis cocktail for manag

## 2018-04-28 NOTE — ED PROVIDER NOTES
Patient Seen in: [facility] Emergency Department    History   Patient presents with:  Fever (infectious)      HPI    Patient presents to the ED after being sent from the cancer center for neutropenia, low-grade fevers and dehydration.   Patient had Rfl: 1 Taking   docusate sodium 100 MG Oral Cap Take 100 mg by mouth 2 (two) times daily. Disp:  Rfl:  Taking   amiodarone HCl 200 MG Oral Tab Take 1 tablet (200 mg total) by mouth every other day.  Disp: 45 tablet Rfl: 3 Taking   Tiotropium Bromide Monohyd children:               Social History Main Topics    Smoking status: Former Smoker                                                                Packs/day: 1.00      Years: 40.00          Quit date: 2/1/2017    Smokeless tobacco: Former User intact distal pulses. Pulmonary/Chest: Effort normal. No stridor. No respiratory distress. She has no wheezes. Abdominal: Soft. She exhibits no distension. There is no tenderness. There is no rebound and no guarding.    Musculoskeletal: She exhibits no (CARAFATE) 1 GM/10ML suspension 1 g (not administered)   Vitamin B-12 (VITAMIN B12) tab 1,000 mcg (not administered)   Umeclidinium Bromide (INCRUSE ELLIPTA) 62.5 MCG/INH inhaler 1 puff (not administered)   Normal Saline Flush 0.9 % injection 3 mL (not adm The patient already has has Thyroid nodule; Family history of celiac disease; Family history of colon cancer; Essential hypertension; COPD (chronic obstructive pulmonary disease) ([address] Lymphedema of both lower extremities;  Malignant neoplasm of body of pa Yes    DNR (do not resuscitate) Z66 4/27/2018 Unknown    Essential hypertension I10 7/19/2015 Yes    Gastritis K29.70 4/27/2018 Unknown    Malignant neoplasm of body of pancreas ([facility] C25.1 4/10/2017 Yes    Neutropenia ([facility] D70.9 3/15/2018 Unknown    Pulm

## 2018-04-29 NOTE — PROGRESS NOTES
[facility]    Progress Note    Brady Zhang Patient Status:  Inpatient    1945 MRN [id number]   Location [facility] 4W/SW/SE Attending Hemanth Fournier MD   Hosp Day # 2 PCP Dayana Winters MD     Subjective:     Calin Santana Dictated by (CST): Giorgio Wright MD on 4/27/2018 at 17:34     Approved by (CST): Giorgio Wright MD on 4/27/2018 at 17:36                Assessment and Plan:   Principal Problem:    Malignant neoplasm of body of pancreas ([facility]  Active Problems:    Neutro

## 2018-04-29 NOTE — PROGRESS NOTES
[facility] Infectious Disease  Progress Note    [place] Patient Status:  Inpatient    1945 MRN [id number]   Location [facility] 4W/SW/SE Attending Oneida Rubinstein, MD   Hosp Day # 2 PCP Samir Vicente MD     Subjective: neutropenic host  - UA negative, blood cultures negative, CXR negative  - Certainly some of these symptoms can be due to chemotherapy alone  - PCT negative  - Will d/c meropenem     2.  Volume depletion, dehydration, falling  - PT/OT to assist  - Better wi

## 2018-04-29 NOTE — PROGRESS NOTES
[facility]    Progress Note    Anette Leiva Patient Status:  Inpatient    1945 MRN [id number]   Location [facility] 4W/SW/SE Attending Emily Pack MD   Hosp Day # 2 PCP Heydi Correia MD     Subjective:   Kathleen Osborn 80 yo F with resectable pancreatic cancer with local regional lymph node involvement [id number], stage IIB s/p cycle 1 of FOLFIRINOX on 4/24/17 with neulasta given on day 3 presents for follow up evaluation s/p extended hospital course related to significant --Discussed with pt that CONKO 001 shows improvement in overall survival for pts and clear benefit in using gemcitabine in the adjuvant setting     --Patient's repeat CA-19-9 testing shows results are lowered but not completed within normal limits; her ful --Planning to check tumor marker() following every 2 cycles of chemotherapy; planned for 6 cycles.    --We will plan to repeat CT scan following after this 4 cycles of chemotherapy .    --Patient appears to have little clinical sequalae based on her  ALT 19 04/17/2018   PTT 74.0 (H) 05/11/2017   INR 1.6 (H) 05/03/2017   T4F 0.83 01/18/2018   TSH 1.08 01/18/2018   MG 1.6 (L) 04/29/2018   PHOS 3.0 05/15/2017   TROP 0.01 05/06/2017   B12 1,266 (H) 01/18/2018       Xr Chest Ap Portable  (cpt=71045)    Resu

## 2018-04-30 NOTE — DISCHARGE SUMMARY
[facility]    Discharge Summary    Mary Jimenez Patient Status:  Inpatient    1945 MRN [id number]   Location [facility] 4W/SW/SE Attending Kylegih Paul MD   Hosp Day # 3 PCP Jimmy Mansfield MD     Date of Admission: also improved with patient was able to take adequate p.o. Activity level improved. Therefore patient was deemed safe for discharge on April 30. She is due for a chemotherapy treatment later this week, but she would like to delay this.   She will discuss total) by mouth 4 (four) times daily as needed for Diarrhea.    Quantity:  60 capsule  Refills:  1     maalox/diphenhydramine/lidocaine viscous Susp  Commonly known as:  STOMATITIS COCKTAIL      Take 5 mL by mouth 4 (four) times daily before meals and night

## 2018-04-30 NOTE — CM/SW NOTE
CTL note:  BPCI flag has been deactivated - pt was enrolled in [facility] Partner program May 2017 - no longer eligible d/t 90 day enrollment period expiration.

## 2018-04-30 NOTE — PROGRESS NOTES
[facility]    Progress Note    Mandy Brant Patient Status:  Inpatient    1945 MRN [id number]   Location [facility] 4W/SW/SE Attending Genevieve Mcnamara MD   Hosp Day # 3 PCP Hany Mays MD     Subjective:     Leny Graves is better and pain is under good control. She remains afebrile off antibiotics  Cell count is now corrected. Activity level has also improved. Therefore we will plan on discharge home later today.   Patient is due to resume chemotherapy later this week h

## 2018-04-30 NOTE — PLAN OF CARE
No acute change over the shift, patient slept well. Norco for  throat pain patient states it is difficult to swallow but pain is improving.

## 2018-04-30 NOTE — OCCUPATIONAL THERAPY NOTE
OCCUPATIONAL THERAPY EVALUATION - INPATIENT     Room Number: 450/450-A  Evaluation Date: 4/30/2018  Type of Evaluation: Initial  Presenting Problem:  (Fever, neutropenia)    Physician Order: IP Consult to Occupational Therapy  Reason for Therapy: ADL/IADL Ill-defined condition     Lipedema; hormone dependent, treated with liposuction   • PONV (postoperative nausea and vomiting)        Past Surgical History  Past Surgical History:  1979: BREAST SURGERY      Comment: augmentation for aesthetics   No date: CHO body clothing?: None  -   Bathing (including washing, rinsing, drying)?: None  -   Toileting, which includes using toilet, bedpan or urinal? : None  -   Putting on and taking off regular upper body clothing?: None  -   Taking care of personal grooming such

## 2018-04-30 NOTE — PHYSICAL THERAPY NOTE
PHYSICAL THERAPY TREATMENT NOTE - INPATIENT     Room Number: 450/450-A       Presenting Problem: weakness,pancreatic CA    Problem List  Principal Problem:    Malignant neoplasm of body of pancreas ([facility]  Active Problems:    Neutropenia, unspecified type ( Static Sitting: Good  Dynamic Sitting: Good           Static Standing: Good  Dynamic Standing: Fair +    ACTIVITY TOLERANCE  O2 Saturation: 96%  Room air  Heart R #4 Patient will negotiate 7  stairs/one curb w/ assistive device and supervision   Goal #4   Current Status 4 stairs with CGA for hospital setting    Goal #5 Patient to demonstrate independence with home activity/exercise instructions provided to patient i

## 2018-05-01 NOTE — TELEPHONE ENCOUNTER
I called to confirm the appointment times for [place] for 5/2. She is scheduled for a lab and Chemo. She states \" I just got out of the Hospital and was told to skip these appointments. \" I assured her I would notify Dr. Quincy Navarro.

## 2018-05-09 NOTE — PROGRESS NOTES
[facility]  Hematology/Oncology  Progress Note    Sonjia Schaumann CSN: [id number]    1945 MRN [id number]   Location [facility] Attending Rossy Hickman MD   Date of Visit 18 PCP Merlyn Doe MD     Chief Complaint: Naldo Ballard by a pneumothorax and is currently recovering with mild dyspnea. She was incidentally screened for hepatitis C and found to be positive.  [place] underwent surgical pancreatic resection on 7/217 at [facility] by Dr. Palmer Bernard after receiving 1 cycle of F COLONOSCOPY  1970s: IMPLANT LEFT  1970s: IMPLANT RIGHT  No date: PANCREATECTOMY,DISTAL+PRESERV DUOD N/A      Comment: partial resection   1951: TONSILLECTOMY      Comment: <age 14 years    Family History   Problem Relation Age of Onset   • Cancer Mother 76 arthralgias, muscle weakness. Neurological: Negative for headaches, speech problems, gait problems and weakness. +grade 1 cold induced neuropathy; +grade 1 peripheral neuropathy    A comprehensive 14 point review of systems was completed.   Pertinent posi total) by mouth nightly as needed for Sleep. Disp: 90 tablet Rfl: 1   docusate sodium 100 MG Oral Cap Take 100 mg by mouth 2 (two) times daily. Disp:  Rfl:    fluorouracil 500 MG/10ML Intravenous Solution Inject 400 mg/m2 into the vein one time.  Disp:  Rfl Plantar fasciitis, bilateral     Edema of right foot     Cellulitis of right lower leg     Chemotherapy follow-up examination     Neutropenia (HCC)     Decreased oral intake     Thrush     Decreased oral intake     Neutropenia, unspecified type ([facility]     T resection on 7/12/17 with clear margins and 6/19 nodes positive for metastatic disease      --Patient's s/p surgical resection, would recommend using adjuvant chemotherapy with gemcitabine plus capecitabine per ESPAC-4 within 12 weeks from surgery with OS progression of disease s/p 4 cycles of FOLFOX (5-FU bolus has not been included since initiation of this regimen; Dose reduction of oxaliplatin by 25% based on her cold induced neuropathy symptoms with Cycle 2 moving forward)    --Discussed with Shanelle montgomery Gastritis/GERD symptoms    --Reminded patient to use her pantoprazole PPI prescription daily  --has prn Carafate therapy as well to help khadijah this symptom  --Improved currently    6.) PE    --Found incidentally on restaging imaging on 1/10/2018; needs 6 m

## 2018-05-10 NOTE — PROGRESS NOTES
Pt here for C2D1 Abraxane. Arrives Ambulating independently, accompanied by Other Self           Modifications in dose or schedule:Yes, pt was hospitalized after last treatment d/t dehydration and mouth sores.      Frequency of blood return and site check

## 2018-05-23 NOTE — TELEPHONE ENCOUNTER
ANC 0.3. Chemo day 15 cancelled for tomorrow. [place] contacted and advised of low ANC and neutropenic precautions reviewed. Reports she is felling well. Infusion advised.

## 2018-06-06 NOTE — PROGRESS NOTES
[place] to lab; port accessed using sterile technique, cbc, CMP,  drawn and sent. Port flushed with a total of 30ml ns, deaccessed and 2X2 and tape applied to port site. She states she has been feeling well.    [place] to lobby stable to await MD freitas

## 2018-06-06 NOTE — PROGRESS NOTES
[facility] AND [facility]  Hematology/Oncology  Progress Note    Mandy Guo NUW:[id number]    1945 MRN [id number]   Location [address] Attending Tomás Robles MD   Date of Visit 18 PCP Hany Mays MD     Chief Complaint: [address] by a pneumothorax and is currently recovering with mild dyspnea. She was incidentally screened for hepatitis C and found to be positive.  [place] underwent surgical pancreatic resection on 7/217 at [facility] by Dr. Asha Mccoy after receiving 1 cycle of F • PONV (postoperative nausea and vomiting)      Past Surgical History:  1979: BREAST SURGERY      Comment: augmentation for aesthetics   No date: CHOLECYSTECTOMY  2009: COLONOSCOPY  1970s: IMPLANT LEFT  1970s: IMPLANT RIGHT  No date: PANCREATECTOMY,DISTA for rash, skin lesions, and pruritus. Hematologic/lymphatic: Negative for easy bruising, bleeding, and lymphadenopathy. Musculoskeletal: Negative for myalgias, arthralgias, muscle weakness.    Neurological: Negative for headaches, speech problems, gait pr docusate sodium 100 MG Oral Cap Take 100 mg by mouth 2 (two) times daily. Disp:  Rfl:    fluorouracil 500 MG/10ML Intravenous Solution Inject 400 mg/m2 into the vein one time.  Disp:  Rfl:    amiodarone HCl 200 MG Oral Tab Take 1 tablet (200 mg total) by without hepatic coma (HCC)     Plantar fasciitis, bilateral     Edema of right foot     Cellulitis of right lower leg     Chemotherapy follow-up examination     Neutropenia (HCC)     Decreased oral intake     Thrush     Decreased oral intake     Neutropeni successfully treated with surgical resection on 7/12/17 with clear margins and 6/19 nodes positive for metastatic disease      --Patient's s/p surgical resection, would recommend using adjuvant chemotherapy with gemcitabine plus capecitabine per ESPAC-4 wi only in phase 1/phase 2    --Pt had progression of disease s/p 4 cycles of FOLFOX (5-FU bolus has not been included since initiation of this regimen; Dose reduction of oxaliplatin by 25% based on her cold induced neuropathy symptoms with Cycle 2 moving for treatment for her pancreatic cancer when she had limited stage disease, less likely to treat with Kumar Machado now in light of her stage IV pancreatic disease    5.) Gastritis/GERD symptoms    --Reminded patient to use her pantoprazole PPI prescription daily  -

## 2018-06-07 NOTE — PROGRESS NOTES
Pt here for C3D1. Patient states she is feeling good. Had a month off from chemotherapy. Swimming daily.   Arrives Ambulating independently, accompanied by Other self           Modifications in dose or schedule: YesAbraxane dose reduced for toxcities

## 2018-06-21 NOTE — PROGRESS NOTES
Pt here for C3 D15. Patient states she is feeling good. Arrives Ambulating independently, accompanied by Other self           Modifications in dose or schedule: YesAbraxane dose reduced for toxcities  Reports mild neuropathy to feet and fingertips.   Janette Zelaya

## 2018-07-03 NOTE — PROGRESS NOTES
[facility] AND [facility]  Hematology/Oncology  Progress Note    Mary Jimenez CSN: [id number]    1945 MRN [id number]   Location [address] Attending Lilian Sutherland MD   Date of Visit 18 PCP Jimmy Mansfield MD     Chief Complaint: Ricardo Jimenez by a pneumothorax and is currently recovering with mild dyspnea. She was incidentally screened for hepatitis C and found to be positive.  [place] underwent surgical pancreatic resection on 7/217 at [facility] by Dr. Sarah Sharpe after receiving 1 cycle of F Ill-defined condition     Lipedema; hormone dependent, treated with liposuction   • PONV (postoperative nausea and vomiting)      Past Surgical History:  1979: BREAST SURGERY      Comment: augmentation for aesthetics   No date: CHOLECYSTECTOMY  2009: COLON symptoms, vomiting, change in bowel habits, +diarrhea   Integument/breast: Negative for rash, skin lesions, and pruritus. Hematologic/lymphatic: Negative for easy bruising, bleeding, and lymphadenopathy.   Musculoskeletal: Negative for myalgias, arthralgia Take 1 tablet (400 mg total) by mouth every 8 (eight) hours. Disp: 90 tablet Rfl: 3   Loperamide HCl 2 MG Oral Cap Take 1 capsule (2 mg total) by mouth 4 (four) times daily as needed for Diarrhea.  Disp: 60 capsule Rfl: 1   Rivaroxaban 20 MG Oral Tab Take 1 extremities     Malignant neoplasm of body of pancreas ([facility]     Chemotherapy management, encounter for     Anemia     Atrial flutter (HCC)     Hypomagnesemia     Hypokalemia     Chronic hepatitis C without hepatic coma (HCC)     Plantar fasciitis, bilater retroperitoneum, new from 5/2017 not obviously reported to be mets; might be reactive to her low back pain  --Patient's case reviewed [facility] and she was deemed eligible for surgical resection and successfully treated with surgical resection on 7/12/17 pembrolizumab is not an option    --Pt has a KRAS mutation;  [facility] reports shows may be amenable for cobimetinib or trametinib; these are FDA approved therapies for another tumor type; melanoma only in phase 1/phase 2    --Pt had progression o recommendations found to be positive no evidence of symptomatic liver dysfunction LFTs are normal  --Initially informed patient that she and her primary care physician can discuss plans for [facility] treatment with curative intent following treatment for her The diagnosis, prognosis, and general treatment was explained to the patient and the family. Brennan Terry MD  [facility]  [address] [address]  [address] [place] [address]

## 2018-07-10 NOTE — PROGRESS NOTES
Port accessed using sterile technique. Labs collected per order. Port deaccessed, site covered w/ 2x2 et secured w/ tape. Appeared to tolerate well.     Pt discharged home ambulatory

## 2018-07-11 NOTE — PATIENT INSTRUCTIONS
On-body Injector for Neulasta Patient Instructions       Your On-Body Injection device was applied on this day:7/11  at this time 1:30    Your dose of medication will start on this day:7/12 at this time 4:30    S

## 2018-07-11 NOTE — PROGRESS NOTES
Pt here for C4D1 Abraxane + On Pro Neulasta. Arrives Ambulating independently    Modifications in dose or schedule: no, was decreased previously.  C4 was deferred for decreased ANC which is now wnl at 3.3    Has lingering neuropathy greater in bilateral fe

## 2018-07-25 NOTE — PROGRESS NOTES
Pt here for C4D15 Abraxane . Arrives Ambulating independently    Modifications in dose or schedule: no    Has lingering neuropathy greater in bilateral feet.      Frequency of blood return and site check throughout administration: Prior to administration,

## 2018-08-07 NOTE — PROGRESS NOTES
Pt arrived for labs via PAC. No Complaints. Port accessed using sterile technique w/ 20G 1\" ashraf, blood return present. Flushed per protocol et deaccessed. Site covered w/ quoce et tape. Appeared to tolerate well.

## 2018-08-08 NOTE — PROGRESS NOTES
[facility]  Hematology/Oncology  Progress Note    [place] CSN: [id number]    1945 MRN [id number]   Location [address] Attending Sherrill Mcgowan MD   Date of Visit 18 PCP Johana Jon MD     Chief Complaint: Cinthia Crespo by a pneumothorax and is currently recovering with mild dyspnea. She was incidentally screened for hepatitis C and found to be positive.  [place] underwent surgical pancreatic resection on 7/217 at [facility] by Dr. Berto Sousa after receiving 1 cycle of F bloating. She continues to remain quite active and independent despite her metastatic pancreatic cancer, all other review of systems is negative.      Past Medical History:   Diagnosis Date   • Cancer [facility])     pancreaticCA   • COPD (chronic obstructive pu 3-4 months there. Review of Systems:    Constitutional: Negative for anorexia, chills, fevers, night sweats and weight loss. Eyes: Negative for visual disturbance, irritation and redness.   Respiratory: Negative for cough, hemoptysis, chest pain, able to carry out work of a light or sedentary nature.      Allergies  No Known Allergies        Current Outpatient Prescriptions:  Pantoprazole Sodium 40 MG Oral Tab EC Take 1 tablet (40 mg total) by mouth every morning before breakfast. Disp: 30 tablet Rf acyclovir 400 MG Oral Tab Take 1 tablet (400 mg total) by mouth every 8 (eight) hours.  Disp: 90 tablet Rfl: 3       Imaging:  CT C/A/P 8/3/18    FINDINGS:  DEVICES:          There is a right-sided accessed Port-A-Cath with tip terminating near the cavoat be intervally increased in conspicuity from 0.4 cm in diameter previously. In the periphery of the left upper lobe, there is a new 0.7 x 0.8 cm nodule (series 4, image 26).  Anteriorly, there is a pleural-based 0.5 cm diameter nodule (series 4 dilatation is present. There is extrahepatic biliary dilatation up to 1.2 cm, not significantly changed from previous. PANCREAS:      Postoperative changes of distal pancreatectomy are evident with evidence of resection of the pancreatic body and tail.  Alex Peralta thickening or pericolonic fat stranding.                Omental nodules in the right lower quadrant, along the right peritoneal reflection, have intervally increased in conspicuity, now measuring up to 1.2 x 0.9 cm (series 8, image 55) compared to 0.8 x 0.6 and interval enlargement of numerous previous pulmonary nodules. 2. Significant interval increase in numerous omental metastatic nodules.      3. The distal appendix is fluid-filled and dilated in caliber, a new finding from previous, but without signif Thrush     Decreased oral intake     Neutropenia, unspecified type ([address]     Thrush, oral     Weakness generalized     Pulmonary emboli ([address]     Gastritis     DNR (do not resuscitate)      Impression:    81year old Fwith resectable pancreatic cancer with gemcitabine plus capecitabine per ESPAC-4 within 12 weeks from surgery with OS rates being about 28 months vs. 25 months with gemcitabine alone  --Discussed with pt that CONKO 001 shows improvement in overall survival for pts and clear benefit in using gem neuropathy symptoms with Cycle 2 moving forward)    --Discussed with [place] plans to switch chemotherapy with Abraxane single agent with palliative intent.      --Pt completed 4 cycles of abraxane, now DR to 100 mg/m2 at cycle 3 and moving forward seconda her diarrhea complications from aggressive triplet regimen chemotherapy   --pt can NOT have zofran in premeds or emergency meds with chemo based on her concomitant use of amiodarone, she responds well with Aloxi premed    3.) Anxiety/depressive symptoms and life altering.    --Will need to consider gabapentin 300 mg qHS for treatment      9.) Goals of Care    --Reviewed the [address] form and pt has elected DNR/DNI and a copy of her form is under \"media\" tabs in EPIC    The patient's emotional well

## 2018-08-27 NOTE — TELEPHONE ENCOUNTER
Returned patient call she has another tooth that needs to be extracted. She would like to have this done this week. Per Dr. Jan Dan she can have dental work done and delay start of chemo by 1 week. Patient was in agreement with this plan.   She will call

## 2018-08-27 NOTE — TELEPHONE ENCOUNTER
[place] would like to speak with the nurse concerning her chemo. She stated, her dentist said, she has to have her tooth extracted and she wants to know if this would delay her chemo treatments.  [place] can be reached at [phone] Please advise thanks

## 2018-09-04 NOTE — PROGRESS NOTES
[place] to lab; port accessed using sterile technique, CBC, CMP drawn and sent. Port flushed with a total of 20ml ns, deaccessed and 2X2 and tape applied to port site. She states she has been feeling well.    [place] escorted to lobby stable

## 2018-09-05 NOTE — PROGRESS NOTES
Pt here for C1D1.   Arrives Ambulating independently, accompanied by Other Self           Modifications in dose or schedule: No     Frequency of blood return and site check throughout administration: Prior to administration, Prior to each drug and At comple

## 2018-09-18 NOTE — PROGRESS NOTES
[facility] AND [facility]  Hematology/Oncology  Progress Note    Kati Mallory CSN: [id number]    1945 MRN [id number]   Location [address] Attending Josie Coto MD   Date of Visit 18 PCP Brendan Reis MD     Chief Complaint: Abby Webber by a pneumothorax and is currently recovering with mild dyspnea. She was incidentally screened for hepatitis C and found to be positive.  [place] underwent surgical pancreatic resection on 7/217 at [facility] by Dr. Sabine Bunn after receiving 1 cycle of F neuropathy symptoms in her feet, very little in her hands from prior treatment. Denies any chest pain, dyspnea, nausea, vomiting, or bloating.   She continues to remain quite active and independent despite her metastatic pancreatic cancer, all other review Years: 40.00        Pack years: 36        Quit date: 2017        Years since quittin.6      Smokeless tobacco: Former User    Substance and Sexual Activity      Alcohol use:  Yes        Alcohol/week: 0.0 oz        Comment: one per month      Drug us lymphadenopathy. Musculoskeletal: Negative for myalgias, arthralgias, muscle weakness. Neurological: Negative for headaches, speech problems, gait problems and weakness.  +grade 1 peripheral neuropathy    A comprehensive 14 point review of systems was co mirtazapine 15 MG Oral Tab Take 1 tablet (15 mg total) by mouth nightly. Disp: 90 tablet Rfl: 3   docusate sodium 100 MG Oral Cap Take 100 mg by mouth 2 (two) times daily.  Disp:  Rfl:    fluorouracil 500 MG/10ML Intravenous Solution Inject 400 mg/m2 into Chemotherapy follow-up examination     Neutropenia (HCC)     Decreased oral intake     Thrush     Decreased oral intake     Neutropenia, unspecified type ([facility]     Thrush, oral     Weakness generalized     Pulmonary emboli (HCC)     Gastritis     DNR (do n disease      --Patient's s/p surgical resection, would recommend using adjuvant chemotherapy with gemcitabine plus capecitabine per ESPAC-4 within 12 weeks from surgery with OS rates being about 28 months vs. 25 months with gemcitabine alone  --Discussed w since initiation of this regimen; Dose reduction of oxaliplatin by 25% based on her cold induced neuropathy symptoms with Cycle 2 moving forward)    --Discussed with [place] plans to switch chemotherapy with Abraxane single agent with palliative intent. premed    3.) Anxiety/depressive symptoms    --Still has some high anxiety scores on visit screen assessments, but she is less anxious and more comfortable with this current chemotherapy regimen  --Pt's mental health is improved s/p joining support group a Appropriate resources were reviewed and discussed with the pateint and family. I spent 30 minutes face to face with the patient. More than 50% of that time was spent counseling the patient and/or on coordination of care.   The diagnosis, prognosis,

## 2018-09-19 NOTE — PROGRESS NOTES
Pt here for C2D1 Onivyde/Leucovorin/5fu CADD pump\. Arrives Ambulating independently, accompanied by Other Self           Modifications in dose or schedule: No    CADD pump connected and running. All connections reinforced with tape.  Port access is clean didn't experience many side effects.    Method:  Brief focused, Discussion and Printed material  Outcome:  Needs reinforcement  Comments:

## 2018-10-02 NOTE — PROGRESS NOTES
[facility] AND [facility]  Hematology/Oncology  Progress Note    Vicki Flynn CSN: [id number]    1945 MRN [id number]   Location [address] Attending Ana Cristina Contreras MD   Date of Visit 10/02/18 PCP Karin Canela MD     Chief Complaint: Paul Celestin by a pneumothorax and is currently recovering with mild dyspnea. She was incidentally screened for hepatitis C and found to be positive.  [place] underwent surgical pancreatic resection on 7/217 at [facility] by Dr. Amos Cuevas after receiving 1 cycle of F feet, very little in her hands from prior treatment. Denies any chest pain, dyspnea, nausea, vomiting, or bloating. She continues to remain quite active and independent despite her metastatic pancreatic cancer, all other review of systems is negative. years: 36        Quit date: 2017        Years since quittin.6      Smokeless tobacco: Former User    Substance and Sexual Activity      Alcohol use:  Yes        Alcohol/week: 0.0 oz        Comment: one per month      Drug use: Yes        Types: Roshan Del Valle Negative for headaches, speech problems, gait problems and weakness. +grade 1 peripheral neuropathy    A comprehensive 14 point review of systems was completed. Pertinent positives and negatives noted in the the HPI.      Physical Exam:    Blood pressure 1 mouth every other day.  Disp: 45 tablet Rfl: 3   Tiotropium Bromide Monohydrate (SPIRIVA HANDIHALER) 18 MCG Inhalation Cap INHALE THE CONTENTS OF 1 CAPSULE EVERY DAY Disp: 90 capsule Rfl: 1   maalox/diphenhydramine/lidocaine viscous Oral Suspension Take 5 m intake     Neutropenia, unspecified type ([address]     Thrush, oral     Weakness generalized     Pulmonary emboli ([address]     Gastritis     DNR (do not resuscitate)      Impression:    72year old Fwith resectable pancreatic cancer with local regional lymph node per ESPAC-4 within 12 weeks from surgery with OS rates being about 28 months vs. 25 months with gemcitabine alone  --Discussed with pt that CONKO 001 shows improvement in overall survival for pts and clear benefit in using gemcitabine in the adjuvant setti moving forward)    --Discussed with [place] plans to switch chemotherapy with Abraxane single agent with palliative intent. --Pt completed 4 cycles of abraxane, now DR to 100 mg/m2 at cycle 3 and moving forward secondary to mucositis and neuropathy.  P amiodarone, she responds well with Aloxi premed  --strongly recommending regular potassium repletion at this time to prevent complications    3.) Anxiety/depressive symptoms    --Still has some high anxiety scores on visit screen assessments, but she is le DNR/DNI and a copy of her form is under \"media\" tabs in EPIC    The patient's emotional well being was assessed and resources were discussed. Appropriate resources were reviewed and discussed with the pateint and family.      10.) Hypokalemia    --Pt rec

## 2018-10-03 NOTE — PROGRESS NOTES
Pt here for C3D1 Onivyde/Leucovorin/5fu CADD pump      Arrives Ambulating independently, accompanied by Other Self           Modifications in dose or schedule: yes     Dose reduction Onivyde     CADD pump connected and running.  All connections reinforced w first cycle went well and she didn't experience many side effects.    Method:  Brief focused, Discussion and Printed material  Outcome:  Needs reinforcement  Comments:

## 2018-10-10 NOTE — TELEPHONE ENCOUNTER
Toxicities: C3 D1 Irinotecan/Leucovorian/5 FU & Pegfilgrastim-JMDB on 10/3/2018    Fatigue/Dyspnea/Anorexia/Nausea/Dehydration/Diarrhea    Fever: Grade 0([place] denies c/s/f. No urgency, frequency or burning with urination. She is voiding yellow urine, no hematuria.)  Fatigue: Grade 1([place] reports feeling more fatigue then she did after her previous cycle of chemo. )  Dyspnea: Grade 1([place] reports she always had some dyspnea due to her COPD. She noticed she is a little more dyspnic when she needs to take her garbage out. She reports needing to sit & catch her breath. She denies chest pain or chest pressure.)  Anorexia: Grade 2([place] reports having a poor appetite. She has been hesitant to take her cannabis because she has had workmen in her home. She smoked some last night & took her edible this morning. )  Nausea: Grade 1([place] reports having some nausea in the evening since Sunday. She resumed using her cannabis last night & today.)  Vomiting: Grade 0([place] reports she vomited once last night. No vomiting today.)  Dehydration: Grade 1([place] states she is drinking about 64oz of fluids daily. She reports having some dizziness intermittently since Sunday. She denies feeling light headed or dizzy today. She will work on pushing fluids. )  Diarrhea: Grade 1([place] reports she has been having 2-3 episodes of liquid, brown stool daily since Sunday night. She denies hematochezia or melena. She stated she does not take her antidiarrheal medication until she has had 3 liquid stools. I recommended she start the medication with the first liquid stool of the day. I also reviewed the BRAT diet.)      I offered [place] an acute care appt with CAIT Bello for today. She declined the appt. She will take her antidiarrheal medication, push fluids, eat a BRAT diet & use her cannabis today. If she is not feeling better tomorrow she will call to book an acute care appt.

## 2018-10-10 NOTE — TELEPHONE ENCOUNTER
[place] has diarrea every morning and every night since the chemo. [place] has a lost of appetite. She stated, she has energy to do anything.  [place] would like to speak with a nurse and can be reached at [phone] Please Advise

## 2018-10-17 NOTE — PROGRESS NOTES
Pt here for C4D1 Onivyde/Leucovorin/5fu CADD pump. Arrives Ambulating independently, accompanied by Other Self            Modifications in dose or schedule: yes     Dose reduction Onivyde d/t increased diarrhea episodes.                                Freq material  Outcome:  Needs reinforcement  Comments:

## 2018-10-19 NOTE — PROGRESS NOTES
[place] to lab today for CADD disconnect, port flush, and Fulphila. She arrives alert and independent. Short of breath on exertion, at baseline for patient. She is eating and drinking well, feeling well.   Patient presented with CADD pump connected and sto

## 2018-10-21 NOTE — PROGRESS NOTES
[facility]  Hematology/Oncology  Progress Note    Barbara Mcallister CSN: [id number]    1945 MRN [id number]   Location [address] Attending Ronda Lamar MD   Date of Visit 10/16/18 PCP Luke Chung MD     Chief Complaint: Franki Mallet by a pneumothorax and is currently recovering with mild dyspnea. She was incidentally screened for hepatitis C and found to be positive.  [place] underwent surgical pancreatic resection on 7/217 at [facility] by Dr. Ke Grady after receiving 1 cycle of F her feet, very little in her hands from prior treatment. Denies any chest pain, dyspnea, nausea, vomiting, or bloating.   She continues to remain quite active and independent despite her metastatic pancreatic cancer, all other review of systems is negative date: 2017        Years since quittin.7      Smokeless tobacco: Former User    Substance and Sexual Activity      Alcohol use:  Yes        Alcohol/week: 0.0 oz        Comment: one per month      Drug use: Yes        Types: Cannabis        Comment: F speech problems, gait problems and weakness. +grade 1 peripheral neuropathy    A comprehensive 14 point review of systems was completed. Pertinent positives and negatives noted in the the HPI.      Physical Exam:    Blood pressure 122/61, pulse 74, tempera Disp: 45 tablet Rfl: 3   Tiotropium Bromide Monohydrate (SPIRIVA HANDIHALER) 18 MCG Inhalation Cap INHALE THE CONTENTS OF 1 CAPSULE EVERY DAY Disp: 90 capsule Rfl: 1   maalox/diphenhydramine/lidocaine viscous Oral Suspension Take 5 mL by mouth 4 (four) dale [id number], stage IIB s/p cycle 1 of FOLFIRINOX on 4/24/17 with neulasta given on day 3 presents for follow up evaluation s/p extended hospital course related to significant diarrhea which then led to extensive electrolyte loss which then developed into the d setting    --Patient's repeat CA-19-9 testing shows results are lowered but not completed within normal limits; her full body CT scan showed TIN from a recurrence standpoint and will serve as her new baseline  --We've informed her that ESPAC-4 looked at pt neuropathy. Pt had 1 month of treatment delays due to neutropenia.  Pt received Neulasta after treatment cycles    --We have completed restaging CT imaging following 4 cycles of Abraxane which shows disease progression with new pulmonary nodules, increase p to prevent complications    3.) Anxiety/depressive symptoms    --Still has some high anxiety scores on visit screen assessments, but she is less anxious and more comfortable with this current chemotherapy regimen  --Pt's mental health is improved s/p anel resources were discussed. Appropriate resources were reviewed and discussed with the pateint and family.      10.) Hypokalemia    --Pt recommended for 40 meQ daily for the next 7 days to prevent any electrolyte cardiac abnormalities based on her hx of afib

## 2018-11-06 NOTE — PATIENT INSTRUCTIONS
1. Proceed with cycle 5 chemotherapy    2. Consider Potassium Chloride 20 mEq one tablet daily to maintain. Will check magnesium level    3. Call as needed    4. Follow-up with Dr. Gillian Miller prior to cycle 6.

## 2018-11-07 NOTE — PROGRESS NOTES
Pt here for C5D1 Onivyde/Leucovorin/5fu CADD pump.   Arrives Ambulating independently, accompanied by Other Self            Modifications in dose or schedule: no     .                                Frequency of blood return and site check throughout admini

## 2018-11-10 NOTE — PROGRESS NOTES
[facility] AND [facility]  Hematology/Oncology  Progress Note    Amirah Wilkerson CSN: [id number]    1945 MRN [id number]   Location [address] [facility] Attending Tiffanie Mccormick MD   Date of Visit 10/16/18 PCP Vito Rojas MD     Chief Complaint: Tram Castro by a pneumothorax and is currently recovering with mild dyspnea. She was incidentally screened for hepatitis C and found to be positive.  [place] underwent surgical pancreatic resection on 7/217 at [facility] by Dr. Ke Grday after receiving 1 cycle of F mild neuropathy symptoms in her feet, very little in her hands from prior treatment. Denies any chest pain, nausea, vomiting, or bloating.   She continues to remain quite active and independent despite her metastatic pancreatic cancer, all other review of years: 38        Quit date: 2017        Years since quittin.7      Smokeless tobacco: Former User    Substance and Sexual Activity      Alcohol use:  Yes        Alcohol/week: 0.0 oz        Comment: one per month      Drug use: Yes        Types: Dulce Elliott Negative for headaches, speech problems, gait problems and weakness. +grade 1 peripheral neuropathy    A comprehensive 14 point review of systems was completed. Pertinent positives and negatives noted in the the HPI.      Physical Exam:    Blood pressure 1 MG/10ML Intravenous Solution Inject 400 mg/m2 into the vein one time. Disp:  Rfl:    amiodarone HCl 200 MG Oral Tab Take 1 tablet (200 mg total) by mouth every other day.  Disp: 45 tablet Rfl: 3   Tiotropium Bromide Monohydrate (SPIRIVA HANDIHALER) 18 MCG I emboli ([facility] [address]     Gastritis     DNR (do not resuscitate)      Impression:    72year old Fwith resectable pancreatic cancer with local regional lymph node involvement [id number], stage IIB s/p cycle 1 of FOLFIRINOX on 4/24/17 with neulasta given on day 3 prese gemcitabine alone  --Discussed with pt that CONKO 001 shows improvement in overall survival for pts and clear benefit in using gemcitabine in the adjuvant setting    --Patient's repeat CA-19-9 testing shows results are lowered but not completed within norm agent with palliative intent. --Pt completed 4 cycles of abraxane, now DR to 100 mg/m2 at cycle 3 and moving forward secondary to mucositis and neuropathy. Pt had 1 month of treatment delays due to neutropenia.  Pt received Neulasta after treatment cycl cardiology   --Atrial flutter cardiac symptoms are likely a consequence of her profound electrolyte abnormalities related to her diarrhea complications from aggressive triplet regimen chemotherapy   --pt can NOT have zofran in premeds or emergency meds wit stable    --Dose reduced oxaliplatin per above to 65 mg/m2 with FOLFOX; now off this form of therapy. --Pt feels symptoms are mild and life altering.    --Will need to consider gabapentin 300 mg qHS for treatment      10) Goals of Care    --Reviewed the I

## 2018-11-27 NOTE — PROGRESS NOTES
[facility] AND [facility]  Hematology/Oncology  Progress Note    Debra Vaca CSN: [id number]    1945 MRN [id number]   Location [address] Attending Shanelle Whitaker MD   Date of Visit 18 PCP Elvis Anguiano MD     Chief Complaint: Barbara Parsons by a pneumothorax and is currently recovering with mild dyspnea. She was incidentally screened for hepatitis C and found to be positive.  [place] underwent surgical pancreatic resection on 7/217 at [facility] by Dr. Mina Dumont after receiving 1 cycle of F bloating. All other review of systems is negative.      Past Medical History:   Diagnosis Date   • Cancer [facility])     pancreaticCA   • COPD (chronic obstructive pulmonary disease) ([facility] [address]     no O2   • Essential hypertension 7/19/2015   • Hepatitis C    • Ill-d use: Yes        Types: Cannabis        Comment: Few times a week      Sexual activity: Not on file    Other Topics      Concerns:         Service: Not Asked        Blood Transfusions: Not Asked        Caffeine Concern: Yes          2 cups of coffee Physical Exam:    Blood pressure 139/70, pulse 94, temperature 98.3 °F (36.8 °C), temperature source Oral, resp. rate 18, height 1.689 m (5' 6.5\"), weight 76.7 kg (169 lb). General: Patient is alert and oriented x 3, not in acute distress.    Psych: Bromide Monohydrate (SPIRIVA HANDIHALER) 18 MCG Inhalation Cap INHALE THE CONTENTS OF 1 CAPSULE EVERY DAY Disp: 90 capsule Rfl: 1   maalox/diphenhydramine/lidocaine viscous Oral Suspension Take 5 mL by mouth 4 (four) times daily before meals and nightly.  D FOLFIRINOX on 4/24/17 with neulasta given on day 3 presents for follow up evaluation s/p extended hospital course related to significant diarrhea which then led to extensive electrolyte loss which then developed into the development of atrial flutter.   Her results are lowered but not completed within normal limits; her full body CT scan showed TIN from a recurrence standpoint and will serve as her new baseline  --We've informed her that ESPAC-4 looked at pts who received treatment within 12 weeks of surgery neutropenia.  Pt received Neulasta after treatment cycles    --We have completed restaging CT imaging following 4 cycles of Abraxane which shows disease progression with new pulmonary nodules, increase previously seen pulmonary nodules by about 0.5 cm, incr cardiology   --Atrial flutter cardiac symptoms are likely a consequence of her profound electrolyte abnormalities related to her diarrhea complications from aggressive triplet regimen chemotherapy   --pt can NOT have zofran in premeds or emergency meds wit

## 2018-12-10 NOTE — PROGRESS NOTES
Patient arrives for port access for PT. Port accessed using sterile technique, positive blood return noted. Patient aware that she needs to return to have port de accessed.

## 2018-12-11 NOTE — TELEPHONE ENCOUNTER
Paulino1 Mayda Fisher who was confused about the dosing of xarelto stating last time she was on it it was for once per day.   Confirmed that since she has been off it for awhile, she needs to have a loading dose of 15 mg twice a day for 3 weeks then go to 20 m

## 2018-12-11 NOTE — TELEPHONE ENCOUNTER
[place] is requesting a call back from Dr. Nancee Cabot RN. She states \" Dr. Joelle Paredes called me on 12/10 with a script for blood thinners. I'm not sure if the dosage is correct and would like to confirm. \"

## 2018-12-13 NOTE — PROGRESS NOTES
[address] Patient Status:  Medical Oncology Series    1945 MRN [id number]   [place] [address] Provider VENTURA Barcenas     PCP NAYANL had been very poor while under treatment with the chemo and she lost 10-15 pounds in the past month. There are no problems chewing but she does have difficulty swallowing large pills such as her potassium supplement.  There is intermittent nausea but no vom dining out with friends and family. She has family locally here. She describes herself as a spiritual person. Symptom Management: [facility] Constipation Algorithm reviewed and provided. Recommended starting with Senokot-S 2 tablets daily.  Discussed possible 7/19/2015   • Hepatitis C    • Ill-defined condition     Lipedema; hormone dependent, treated with liposuction   • PONV (postoperative nausea and vomiting)      Past Surgical History:   Procedure Laterality Date   • BREAST SURGERY  1979    augmentation for Units total) by mouth 4 (four) times daily. Until resolved   Loperamide HCl 2 MG Oral Cap Take 1 capsule (2 mg total) by mouth 4 (four) times daily as needed for Diarrhea. docusate sodium 100 MG Oral Cap Take 100 mg by mouth 2 (two) times daily.    fluoro alopecia  Chest appearance: Kyphosis  Cardiac: deferred  Lungs: Normal excursions and effort. Able to talk in complete sentences. Abdomen: Distended.    General color: pale  Neurologic: level of consciousness-alert  Orientation: x 4  Appearance: appropriat Epic  -#1 BEATRICEOA is sister Karishma Napier can be found in Epic  -See above narrative for further details    Palliative Performance Scale 70%    Emotional support provided: Yes    A total of 25 minutes were spent on this consult, which included all of

## 2018-12-14 NOTE — PROGRESS NOTES
[facility] AND [facility]  Hematology/Oncology  Progress Note    Carmen Sewell CSN: [id number]    1945 MRN [id number]   Location [address] Attending Eliot Baltazar MD   Date of Visit 18 PCP Natividad Sewell MD     Chief Complaint: Kassy Estrada by a pneumothorax and is currently recovering with mild dyspnea. She was incidentally screened for hepatitis C and found to be positive.  [place] underwent surgical pancreatic resection on 7/217 at [facility] by Dr. Andra Sauceda after receiving 1 cycle of F feet, very little in her hands from prior treatment. Denies any chest pain, dyspnea or bloating. All other review of systems is negative.      Past Medical History:   Diagnosis Date   • Cancer [facility])     pancreaticCA   • COPD (chronic obstructive pulmonary Alcohol use:  Yes        Alcohol/week: 0.0 oz        Comment: one per month      Drug use: Yes        Types: Cannabis        Comment: Few times a week      Sexual activity: Not on file    Other Topics      Concerns:         Service: Not Asked Pertinent positives and negatives noted in the the HPI. Physical Exam:    Blood pressure 127/75, pulse 103, temperature 98.1 °F (36.7 °C), temperature source Oral, resp. rate 18, height 1.689 m (5' 6.5\"), weight 76.7 kg (169 lb).     General: Patient i daily as needed for Diarrhea. Disp: 60 capsule Rfl: 1   mirtazapine 15 MG Oral Tab Take 1 tablet (15 mg total) by mouth nightly. Disp: 90 tablet Rfl: 3   docusate sodium 100 MG Oral Cap Take 100 mg by mouth 2 (two) times daily.  Disp:  Rfl:    fluorouracil and rectum suggestive of constipation with fecal impaction. No obstruction or evidence of stercoral colitis.      Small volume perihepatic and mesenteric ascites, new since the prior exam     Multiple other incidental findings as described in the body of th omental mets    --Patient treated with 1 cycle of neoadjuvant FOLFIRINOX on 4/24/17 with neulasta given on day 3 developed significant diarrhea with extended hospital course following treatment making additional chemotherapy sessions unavailable  --Patient new metastatic disease, initially switched to gem/nab paclitaxel; and only given C1D1  --Patient was switched to palliative FOLFOX chemotherapy for stage IV disease treatment as her tumor did not respond well to prior combination gemcitabine therapy      - diarrhea (~55 mg/m2 and for all subsequent cycles)     --Pt completed 5 cycles of liposomal irinotecan (dose reduced to 50 mg/m2 now and all subsequent cycles) along with  5-FU (dose reduced) 1800 mg/m2 as of 11/7/18.     --Planning to check tumor marker(C to prevent complications    3.) Anxiety/depressive symptoms    --Still has some high anxiety scores on visit screen assessments, but she is less anxious and more comfortable with this current chemotherapy regimen  --Pt's mental health is improved s/p anel

## 2019-01-01 ENCOUNTER — TELEPHONE (OUTPATIENT)
Dept: HEMATOLOGY/ONCOLOGY | Facility: HOSPITAL | Age: 74
End: 2019-01-01

## 2019-01-01 ENCOUNTER — APPOINTMENT (OUTPATIENT)
Dept: HEMATOLOGY/ONCOLOGY | Facility: HOSPITAL | Age: 74
End: 2019-01-01
Attending: INTERNAL MEDICINE
Payer: MEDICARE

## 2019-01-01 ENCOUNTER — APPOINTMENT (OUTPATIENT)
Dept: GENERAL RADIOLOGY | Facility: HOSPITAL | Age: 74
DRG: 435 | End: 2019-01-01
Attending: EMERGENCY MEDICINE
Payer: MEDICARE

## 2019-01-01 ENCOUNTER — APPOINTMENT (OUTPATIENT)
Dept: ULTRASOUND IMAGING | Facility: HOSPITAL | Age: 74
DRG: 435 | End: 2019-01-01
Attending: CLINICAL NURSE SPECIALIST
Payer: MEDICARE

## 2019-01-01 ENCOUNTER — HOSPITAL ENCOUNTER (INPATIENT)
Facility: HOSPITAL | Age: 74
LOS: 3 days | Discharge: NURSING, REHAB OR LONG-TERM CARE | DRG: 435 | End: 2019-01-01
Attending: EMERGENCY MEDICINE | Admitting: INTERNAL MEDICINE
Payer: MEDICARE

## 2019-01-01 VITALS
SYSTOLIC BLOOD PRESSURE: 140 MMHG | OXYGEN SATURATION: 93 % | WEIGHT: 158.69 LBS | TEMPERATURE: 98 F | DIASTOLIC BLOOD PRESSURE: 61 MMHG | RESPIRATION RATE: 16 BRPM | HEART RATE: 84 BPM | BODY MASS INDEX: 25.5 KG/M2 | HEIGHT: 66 IN

## 2019-01-01 DIAGNOSIS — C78.00 PANCREATIC CANCER METASTASIZED TO LUNG (HCC): ICD-10-CM

## 2019-01-01 DIAGNOSIS — R18.0 ASCITES, MALIGNANT: Primary | ICD-10-CM

## 2019-01-01 DIAGNOSIS — C25.9 PANCREATIC CANCER METASTASIZED TO LUNG (HCC): ICD-10-CM

## 2019-01-01 LAB
ALBUMIN SERPL BCP-MCNC: 3 G/DL (ref 3.5–4.8)
ALP SERPL-CCNC: 47 U/L (ref 32–100)
ALT SERPL-CCNC: 11 U/L (ref 14–54)
ANION GAP SERPL CALC-SCNC: 14 MMOL/L (ref 0–18)
ANION GAP SERPL CALC-SCNC: 17 MMOL/L (ref 0–18)
ANION GAP SERPL CALC-SCNC: 8 MMOL/L (ref 0–18)
ANION GAP SERPL CALC-SCNC: 8 MMOL/L (ref 0–18)
APTT PPP: 33.7 SECONDS (ref 23.2–35.3)
AST SERPL-CCNC: 23 U/L (ref 15–41)
BASOPHILS # BLD: 0.1 K/UL (ref 0–0.2)
BASOPHILS NFR BLD: 1 %
BILIRUB DIRECT SERPL-MCNC: 0.3 MG/DL (ref 0–0.2)
BILIRUB SERPL-MCNC: 1.3 MG/DL (ref 0.3–1.2)
BILIRUB UR QL: NEGATIVE
BUN SERPL-MCNC: 6 MG/DL (ref 8–20)
BUN SERPL-MCNC: 6 MG/DL (ref 8–20)
BUN SERPL-MCNC: 9 MG/DL (ref 8–20)
BUN SERPL-MCNC: 9 MG/DL (ref 8–20)
BUN/CREAT SERPL: 10.2 (ref 10–20)
BUN/CREAT SERPL: 11.1 (ref 10–20)
BUN/CREAT SERPL: 12.5 (ref 10–20)
BUN/CREAT SERPL: 9.8 (ref 10–20)
CALCIUM SERPL-MCNC: 8.6 MG/DL (ref 8.5–10.5)
CALCIUM SERPL-MCNC: 8.7 MG/DL (ref 8.5–10.5)
CALCIUM SERPL-MCNC: 8.7 MG/DL (ref 8.5–10.5)
CALCIUM SERPL-MCNC: 8.9 MG/DL (ref 8.5–10.5)
CHLORIDE SERPL-SCNC: 100 MMOL/L (ref 95–110)
CHLORIDE SERPL-SCNC: 101 MMOL/L (ref 95–110)
CHLORIDE SERPL-SCNC: 95 MMOL/L (ref 95–110)
CHLORIDE SERPL-SCNC: 96 MMOL/L (ref 95–110)
CLARITY UR: CLEAR
CO2 SERPL-SCNC: 23 MMOL/L (ref 22–32)
CO2 SERPL-SCNC: 24 MMOL/L (ref 22–32)
CO2 SERPL-SCNC: 25 MMOL/L (ref 22–32)
CO2 SERPL-SCNC: 25 MMOL/L (ref 22–32)
COLOR UR: YELLOW
CREAT SERPL-MCNC: 0.59 MG/DL (ref 0.5–1.5)
CREAT SERPL-MCNC: 0.61 MG/DL (ref 0.5–1.5)
CREAT SERPL-MCNC: 0.72 MG/DL (ref 0.5–1.5)
CREAT SERPL-MCNC: 0.81 MG/DL (ref 0.5–1.5)
EOSINOPHIL # BLD: 0.1 K/UL (ref 0–0.7)
EOSINOPHIL # BLD: 0.1 K/UL (ref 0–0.7)
EOSINOPHIL # BLD: 0.3 K/UL (ref 0–0.7)
EOSINOPHIL NFR BLD: 1 %
EOSINOPHIL NFR BLD: 1 %
EOSINOPHIL NFR BLD: 2 %
ERYTHROCYTE [DISTWIDTH] IN BLOOD BY AUTOMATED COUNT: 16.2 % (ref 11–15)
ERYTHROCYTE [DISTWIDTH] IN BLOOD BY AUTOMATED COUNT: 16.2 % (ref 11–15)
ERYTHROCYTE [DISTWIDTH] IN BLOOD BY AUTOMATED COUNT: 16.5 % (ref 11–15)
ERYTHROCYTE [DISTWIDTH] IN BLOOD BY AUTOMATED COUNT: 16.6 % (ref 11–15)
GLUCOSE SERPL-MCNC: 114 MG/DL (ref 70–99)
GLUCOSE SERPL-MCNC: 115 MG/DL (ref 70–99)
GLUCOSE SERPL-MCNC: 85 MG/DL (ref 70–99)
GLUCOSE SERPL-MCNC: 92 MG/DL (ref 70–99)
GLUCOSE UR-MCNC: NEGATIVE MG/DL
HCT VFR BLD AUTO: 32.9 % (ref 35–48)
HCT VFR BLD AUTO: 34.9 % (ref 35–48)
HCT VFR BLD AUTO: 36.1 % (ref 35–48)
HCT VFR BLD AUTO: 36.6 % (ref 35–48)
HGB BLD-MCNC: 10.9 G/DL (ref 12–16)
HGB BLD-MCNC: 11.4 G/DL (ref 12–16)
HGB BLD-MCNC: 11.7 G/DL (ref 12–16)
HGB BLD-MCNC: 11.8 G/DL (ref 12–16)
HGB UR QL STRIP.AUTO: NEGATIVE
INR BLD: 2.3 (ref 0.9–1.2)
INR BLD: 2.6 (ref 0.9–1.2)
LEUKOCYTE ESTERASE UR QL STRIP.AUTO: NEGATIVE
LIPASE SERPL-CCNC: 21 U/L (ref 22–51)
LYMPHOCYTES # BLD: 2.5 K/UL (ref 1–4)
LYMPHOCYTES # BLD: 3.4 K/UL (ref 1–4)
LYMPHOCYTES # BLD: 4.6 K/UL (ref 1–4)
LYMPHOCYTES NFR BLD: 22 %
LYMPHOCYTES NFR BLD: 29 %
LYMPHOCYTES NFR BLD: 33 %
MAGNESIUM SERPL-MCNC: 1.7 MG/DL (ref 1.8–2.5)
MAGNESIUM SERPL-MCNC: 1.7 MG/DL (ref 1.8–2.5)
MAGNESIUM SERPL-MCNC: 1.8 MG/DL (ref 1.8–2.5)
MCH RBC QN AUTO: 29.4 PG (ref 27–32)
MCH RBC QN AUTO: 29.5 PG (ref 27–32)
MCH RBC QN AUTO: 29.5 PG (ref 27–32)
MCH RBC QN AUTO: 30.2 PG (ref 27–32)
MCHC RBC AUTO-ENTMCNC: 32.2 G/DL (ref 32–37)
MCHC RBC AUTO-ENTMCNC: 32.4 G/DL (ref 32–37)
MCHC RBC AUTO-ENTMCNC: 32.5 G/DL (ref 32–37)
MCHC RBC AUTO-ENTMCNC: 33.2 G/DL (ref 32–37)
MCV RBC AUTO: 90.8 FL (ref 80–100)
MCV RBC AUTO: 91 FL (ref 80–100)
MCV RBC AUTO: 91 FL (ref 80–100)
MCV RBC AUTO: 91.8 FL (ref 80–100)
MONOCYTES # BLD: 1.3 K/UL (ref 0–1)
MONOCYTES # BLD: 1.7 K/UL (ref 0–1)
MONOCYTES # BLD: 1.9 K/UL (ref 0–1)
MONOCYTES NFR BLD: 10 %
MONOCYTES NFR BLD: 15 %
MONOCYTES NFR BLD: 17 %
NEUTROPHILS # BLD AUTO: 6.3 K/UL (ref 1.8–7.7)
NEUTROPHILS # BLD AUTO: 6.5 K/UL (ref 1.8–7.7)
NEUTROPHILS # BLD AUTO: 6.8 K/UL (ref 1.8–7.7)
NEUTROPHILS NFR BLD: 53 %
NEUTROPHILS NFR BLD: 53 %
NEUTROPHILS NFR BLD: 59 %
NITRITE UR QL STRIP.AUTO: NEGATIVE
OSMOLALITY UR CALC.SUM OF ELEC: 274 MOSM/KG (ref 275–295)
OSMOLALITY UR CALC.SUM OF ELEC: 276 MOSM/KG (ref 275–295)
OSMOLALITY UR CALC.SUM OF ELEC: 277 MOSM/KG (ref 275–295)
OSMOLALITY UR CALC.SUM OF ELEC: 278 MOSM/KG (ref 275–295)
PH UR: 5 [PH] (ref 5–8)
PHOSPHATE SERPL-MCNC: 3.9 MG/DL (ref 2.4–4.7)
PLATELET # BLD AUTO: 255 K/UL (ref 140–400)
PLATELET # BLD AUTO: 270 K/UL (ref 140–400)
PLATELET # BLD AUTO: 277 K/UL (ref 140–400)
PLATELET # BLD AUTO: 287 K/UL (ref 140–400)
PMV BLD AUTO: 10.2 FL (ref 7.4–10.3)
PMV BLD AUTO: 10.4 FL (ref 7.4–10.3)
PMV BLD AUTO: 10.4 FL (ref 7.4–10.3)
PMV BLD AUTO: 10.9 FL (ref 7.4–10.3)
POTASSIUM SERPL-SCNC: 3 MMOL/L (ref 3.3–5.1)
POTASSIUM SERPL-SCNC: 3.3 MMOL/L (ref 3.3–5.1)
POTASSIUM SERPL-SCNC: 3.7 MMOL/L (ref 3.3–5.1)
PROT SERPL-MCNC: 5.4 G/DL (ref 5.9–8.4)
PROT UR-MCNC: NEGATIVE MG/DL
PROTHROMBIN TIME: 25 SECONDS (ref 11.8–14.5)
PROTHROMBIN TIME: 27.2 SECONDS (ref 11.8–14.5)
RBC # BLD AUTO: 3.62 M/UL (ref 3.7–5.4)
RBC # BLD AUTO: 3.85 M/UL (ref 3.7–5.4)
RBC # BLD AUTO: 3.97 M/UL (ref 3.7–5.4)
RBC # BLD AUTO: 3.99 M/UL (ref 3.7–5.4)
SODIUM SERPL-SCNC: 133 MMOL/L (ref 136–144)
SODIUM SERPL-SCNC: 134 MMOL/L (ref 136–144)
SODIUM SERPL-SCNC: 134 MMOL/L (ref 136–144)
SODIUM SERPL-SCNC: 135 MMOL/L (ref 136–144)
SP GR UR STRIP: 1.01 (ref 1–1.03)
TSH SERPL-ACNC: 3.73 UIU/ML (ref 0.45–5.33)
UROBILINOGEN UR STRIP-ACNC: <2
VARIANT LYMPHS NFR BLD MANUAL: 2 %
VIT C UR-MCNC: NEGATIVE MG/DL
WBC # BLD AUTO: 11.1 K/UL (ref 4–11)
WBC # BLD AUTO: 11.8 K/UL (ref 4–11)
WBC # BLD AUTO: 11.9 K/UL (ref 4–11)
WBC # BLD AUTO: 12.9 K/UL (ref 4–11)

## 2019-01-01 PROCEDURE — 99223 1ST HOSP IP/OBS HIGH 75: CPT | Performed by: INTERNAL MEDICINE

## 2019-01-01 PROCEDURE — 99222 1ST HOSP IP/OBS MODERATE 55: CPT | Performed by: NURSE PRACTITIONER

## 2019-01-01 PROCEDURE — 99232 SBSQ HOSP IP/OBS MODERATE 35: CPT | Performed by: NURSE PRACTITIONER

## 2019-01-01 PROCEDURE — 99232 SBSQ HOSP IP/OBS MODERATE 35: CPT | Performed by: INTERNAL MEDICINE

## 2019-01-01 PROCEDURE — 71045 X-RAY EXAM CHEST 1 VIEW: CPT | Performed by: EMERGENCY MEDICINE

## 2019-01-01 PROCEDURE — 49083 ABD PARACENTESIS W/IMAGING: CPT | Performed by: CLINICAL NURSE SPECIALIST

## 2019-01-01 PROCEDURE — 0W9G3ZZ DRAINAGE OF PERITONEAL CAVITY, PERCUTANEOUS APPROACH: ICD-10-PCS | Performed by: INTERNAL MEDICINE

## 2019-01-01 RX ORDER — LIDOCAINE AND PRILOCAINE 25; 25 MG/G; MG/G
CREAM TOPICAL ONCE
Status: COMPLETED | OUTPATIENT
Start: 2019-01-01 | End: 2019-01-01

## 2019-01-01 RX ORDER — FUROSEMIDE 20 MG/1
20 TABLET ORAL DAILY
Qty: 30 TABLET | Refills: 0 | Status: SHIPPED | OUTPATIENT
Start: 2019-01-01

## 2019-01-01 RX ORDER — POTASSIUM CHLORIDE 29.8 MG/ML
40 INJECTION INTRAVENOUS ONCE
Status: COMPLETED | OUTPATIENT
Start: 2019-01-01 | End: 2019-01-01

## 2019-01-01 RX ORDER — FUROSEMIDE 20 MG/1
20 TABLET ORAL DAILY
Status: DISCONTINUED | OUTPATIENT
Start: 2019-01-01 | End: 2019-01-01

## 2019-01-01 RX ORDER — PROCHLORPERAZINE MALEATE 10 MG
10 TABLET ORAL EVERY 6 HOURS PRN
Status: DISCONTINUED | OUTPATIENT
Start: 2019-01-01 | End: 2019-01-01

## 2019-01-01 RX ORDER — AMIODARONE HYDROCHLORIDE 200 MG/1
200 TABLET ORAL EVERY OTHER DAY
Status: DISCONTINUED | OUTPATIENT
Start: 2019-01-01 | End: 2019-01-01

## 2019-01-01 RX ORDER — POTASSIUM CHLORIDE 14.9 MG/ML
20 INJECTION INTRAVENOUS ONCE
Status: COMPLETED | OUTPATIENT
Start: 2019-01-01 | End: 2019-01-01

## 2019-01-01 RX ORDER — MIRTAZAPINE 30 MG/1
30 TABLET, FILM COATED ORAL NIGHTLY
Qty: 30 TABLET | Refills: 0 | Status: SHIPPED | OUTPATIENT
Start: 2019-01-01 | End: 2019-01-01

## 2019-01-01 RX ORDER — POLYETHYLENE GLYCOL 3350 17 G/17G
17 POWDER, FOR SOLUTION ORAL DAILY PRN
Status: DISCONTINUED | OUTPATIENT
Start: 2019-01-01 | End: 2019-01-01

## 2019-01-01 RX ORDER — POTASSIUM CHLORIDE 20 MEQ/1
20 TABLET, EXTENDED RELEASE ORAL DAILY
Qty: 30 TABLET | Refills: 0 | Status: SHIPPED | OUTPATIENT
Start: 2019-01-01

## 2019-01-01 RX ORDER — SODIUM PHOSPHATE, DIBASIC AND SODIUM PHOSPHATE, MONOBASIC 7; 19 G/133ML; G/133ML
1 ENEMA RECTAL ONCE AS NEEDED
Status: DISCONTINUED | OUTPATIENT
Start: 2019-01-01 | End: 2019-01-01

## 2019-01-01 RX ORDER — POTASSIUM CHLORIDE 20 MEQ/1
40 TABLET, EXTENDED RELEASE ORAL ONCE
Status: COMPLETED | OUTPATIENT
Start: 2019-01-01 | End: 2019-01-01

## 2019-01-01 RX ORDER — ONDANSETRON 2 MG/ML
4 INJECTION INTRAMUSCULAR; INTRAVENOUS EVERY 6 HOURS PRN
Status: DISCONTINUED | OUTPATIENT
Start: 2019-01-01 | End: 2019-01-01

## 2019-01-01 RX ORDER — PROCHLORPERAZINE MALEATE 10 MG
10 TABLET ORAL EVERY 6 HOURS PRN
Qty: 30 TABLET | Refills: 0 | Status: SHIPPED | OUTPATIENT
Start: 2019-01-01

## 2019-01-01 RX ORDER — SODIUM CHLORIDE 0.9 % (FLUSH) 0.9 %
3 SYRINGE (ML) INJECTION AS NEEDED
Status: DISCONTINUED | OUTPATIENT
Start: 2019-01-01 | End: 2019-01-01

## 2019-01-01 RX ORDER — ALPRAZOLAM 1 MG/1
1 TABLET ORAL NIGHTLY PRN
Status: DISCONTINUED | OUTPATIENT
Start: 2019-01-01 | End: 2019-01-01

## 2019-01-01 RX ORDER — MAGNESIUM OXIDE 400 MG (241.3 MG MAGNESIUM) TABLET
400 TABLET ONCE
Status: COMPLETED | OUTPATIENT
Start: 2019-01-01 | End: 2019-01-01

## 2019-01-01 RX ORDER — POTASSIUM CHLORIDE 20 MEQ/1
20 TABLET, EXTENDED RELEASE ORAL DAILY
Status: DISCONTINUED | OUTPATIENT
Start: 2019-01-01 | End: 2019-01-01

## 2019-01-01 RX ORDER — DOCUSATE SODIUM 100 MG/1
100 CAPSULE, LIQUID FILLED ORAL 2 TIMES DAILY
Status: DISCONTINUED | OUTPATIENT
Start: 2019-01-01 | End: 2019-01-01

## 2019-01-01 RX ORDER — POLYETHYLENE GLYCOL 3350 17 G/17G
17 POWDER, FOR SOLUTION ORAL DAILY
Status: DISCONTINUED | OUTPATIENT
Start: 2019-01-01 | End: 2019-01-01

## 2019-01-01 RX ORDER — METOCLOPRAMIDE 10 MG/1
10 TABLET ORAL
Qty: 30 TABLET | Refills: 0 | Status: SHIPPED | OUTPATIENT
Start: 2019-01-01

## 2019-01-01 RX ORDER — MIRTAZAPINE 15 MG/1
30 TABLET, FILM COATED ORAL NIGHTLY
Status: DISCONTINUED | OUTPATIENT
Start: 2019-01-01 | End: 2019-01-01

## 2019-01-01 RX ORDER — SODIUM CHLORIDE 9 MG/ML
INJECTION, SOLUTION INTRAVENOUS
Status: COMPLETED
Start: 2019-01-01 | End: 2019-01-01

## 2019-01-01 RX ORDER — FLUCONAZOLE 100 MG/1
100 TABLET ORAL DAILY
Qty: 4 TABLET | Refills: 0 | Status: SHIPPED | OUTPATIENT
Start: 2019-01-01 | End: 2019-01-01

## 2019-01-01 RX ORDER — ACETAMINOPHEN 325 MG/1
650 TABLET ORAL EVERY 6 HOURS PRN
Status: DISCONTINUED | OUTPATIENT
Start: 2019-01-01 | End: 2019-01-01

## 2019-01-01 RX ORDER — FLUCONAZOLE 100 MG/1
100 TABLET ORAL DAILY
Status: DISCONTINUED | OUTPATIENT
Start: 2019-01-01 | End: 2019-01-01

## 2019-01-01 RX ORDER — METOCLOPRAMIDE HYDROCHLORIDE 5 MG/ML
10 INJECTION INTRAMUSCULAR; INTRAVENOUS EVERY 8 HOURS PRN
Status: DISCONTINUED | OUTPATIENT
Start: 2019-01-01 | End: 2019-01-01

## 2019-01-01 RX ORDER — MIRTAZAPINE 15 MG/1
15 TABLET, FILM COATED ORAL NIGHTLY
Status: DISCONTINUED | OUTPATIENT
Start: 2019-01-01 | End: 2019-01-01

## 2019-01-01 RX ORDER — BISACODYL 10 MG
10 SUPPOSITORY, RECTAL RECTAL
Status: DISCONTINUED | OUTPATIENT
Start: 2019-01-01 | End: 2019-01-01

## 2019-01-11 NOTE — TELEPHONE ENCOUNTER
Returned call to [place] who is in [facility], she had concerns about poor appetite and wanted something to stimulate her appetite.   Denies dizziness, increased weakness(she went to [facility] to try to get stronger by swimming and being active)  I talked to her

## 2019-01-11 NOTE — TELEPHONE ENCOUNTER
[place] calling the last three days she has had no appetite she felt like vomiting yesterday. [place] is currently in [facility].  To reach her in [facility] dial [phone]  Or email    [email or web address]

## 2019-01-16 NOTE — TELEPHONE ENCOUNTER
Toxicities: Pt with pancreatic cancer. Had restaging CT scan on 12/10/2018 & f/u appt with Dr Anaid Lozoya on 12/13/2018.  Pt is currently in [facility]. She is returning on Saturday 1/19/2019     Anorexia/Nausea/Vomiting/Constipation    Fever: Grade 0 (9400 No Name Major

## 2019-01-21 NOTE — TELEPHONE ENCOUNTER
Theora Session states that she is physically declining (still in [facility]) has been treated by her physician down there for a UTI, but she has lost her appetite, is very weak, has ascites and fluid retention in her legs making it impossible to walk by self. She is coming home tomorrow per [facility] ETA around 5pm.  She wants to know where she should go and if we can arrange for 24 hour caregiver support. She is hoping palliative care can assist.    I told her she will need to come directly to the hospital upon arrival to the [facility] to be evaluated. I will update Dr Ann Marie Munoz. I have updated our  Clarence Ames and our palliative care team re this issue. Internal Rotation     Hip External Rotation     Knee Flexion 5 3+   Knee Extension 5 4-   Ankle Dorsiflexion 5 5   Ankle Plantar flexion     standard testing positions unless otherwise noted, key: * denotes pain  Comments: Only muscle strength that was assessed are noted.      Palpation:  Tender over vastus lateralis and rectus femoris proximal 1/3 of the muscles  Tender just distal to ASIS  Elevated R iliac crest, PSIS and depressed ASIS  No tenderness over R superior pubic ramus      Functional Tests:  TBA    Knee Circumference: (cm)   Left Right   30 51 52   20 44 45   10 cm Proximal  38 39   5 cm Proximal 37 39   Joint Line         36 37   5 cm Distal     10 cm Distal     Comments:        Outcome Measures:   Lower Extremity Functional Scale: 27 (0=extreme difficulty; 80=no difficulty)     Initial Treatment   Initial evaluation completed.    Therapeutic Exercise:   Ankle pumps  QS  Heel slides  Supine hip abd  Gluteal sqeezes  Standing heel raises  Standing hip abd  Standing knee flexion  Standing hip ext    Neuromuscular Reeducation:  K tape 2 fans crossing over area of thigh most swollen going down to medial and lateral knee      Initial Home Program:  * above denotes home program issuance as well  Same as above  Cont to ice and elevate    Plan for next session: Bike or nu step    ASSESSMENT   79 year old male presents to therapy with significant decline in prior level of function due to signs and symptoms consistent with R thigh hemotoma    Outcome:    Benefit from skilled therapy: yes   Rehab potential is good due to positive factors motivation level and negative factors not apparent at this time    Predicted patient presentation: Low (stable) - Patient comorbidities and complexities, as defined above, will have little effect on progress for prescribed plan of care.  Plan of care to increase strength/stability, increase range of motion, decrease pain, decrease swelling/edema, improve joint mobility, improve  safety at home and in community, improve activity tolerance, improve quality of gait, improve activities of daily living and instrumental activites of daily living, improve functional independence to address functional limitations listed above.    Goals:  To be obtained by end of this plan of care:  1. Patient independent with modified and progressed home exercise program.  2. Patient will decrease involved hip pain to 0/10  to aid in community ambulation for activities of independent living.   3. Patient will increase involved hip active range of motion to WNL all directions° to aid in age appropriate activities.  4. Patient will increase involved hip strength to 5/5 to aid in squatting for lifting for household independent living.   5. Patient will ambulate community distances on even and uneven terrain with normal gait pattern without assistive device and without loss of balance.  6. Patient will be able to ascend and descend 1 flight of stairs using reciprocal pattern without  pain/difficulty.  7. Patient will be able to complete transfers including toilet and car independently, without  pain/difficulty.  8. Patient will be able to tolerate standing activities for > or equal to 30 minutes without  pain/difficulty.   9. Patient/Client will be able to lift 40 pounds from floor to waist with proper body mechanics to assist with lifting activities at work.  10. Patient/Client will demonstrate 5/5 strength of hip to assist with their ability to tolerate at work.    PLAN   Frequency/Duration: 2 times per week for 6 weeks with tapering as the patient progresses  Skilled training and instruction for the following interventions:  Gait Training (22210)  Manual Therapy (47980)  Neuromuscular Re-Education (35123)  Therapeutic Activity (81451)  Therapeutic Exercise (40274)  Electrical Stimulation (40117//08385)   Heat/Cold (12309)  Ultrasound/Phonophoresis (94733)  Vasopneumatic Device (78305)    The plan of care and  goals were established with the patient who concurs.  Patient has been given attendance policy at time of initial evaluation.    Patient Education:  Who will be receiving education: patient  Are they ready to learn: yes  Preferred learning style: written, verbal, demonstration  Barriers to learning: no barriers apparent at this time   Result of initial outlined education: Verbalizes understanding and Demonstrates understanding    THERAPY DAILY BILLING   Primary Insurance: [facility]  Secondary Insurance: N/A    Evaluation Procedures:  Physical Therapy Evaluation: Low Complexity    Timed Procedures:  Therapeutic Exercise, 10 minutes    Untimed Procedures:  No untimed codes were used on this date of service    Total Treatment Time: 55 minutes

## 2019-01-21 NOTE — TELEPHONE ENCOUNTER
Sharmila's friend Isabelle Delarosa calling to let Dr Racquel Vázquez know that Stewart Sullivan is be [facility] to home. She is in very weak condition and looking for 24 hour care. Sister is in [place] any position in her care. Her daughter left on Saturday for her honeymoon.  Please con

## 2019-01-21 NOTE — TELEPHONE ENCOUNTER
Call placed to [facility] after obtaining permission from [place]. Explained that [place] needs to be evaluated at the hospital first, then plans for going home with caregiver support and skilled services will be addressed.   I thanked [facility] for her support,

## 2019-01-21 NOTE — TELEPHONE ENCOUNTER
[place] called and left a message to cancel her appointment today due to [place] the [place]. When I called back to cancel she also informed me that she is currently being treated for a bladder infection while out of the country. She has seen the doctor there and is being treated with diuretics and anti biotics. She just wanted to inform  that this is going on.  She can be reached at [phone] for any further questions

## 2019-01-23 PROBLEM — C78.00 PANCREATIC CANCER METASTASIZED TO LUNG (HCC): Status: ACTIVE | Noted: 2019-01-01

## 2019-01-23 PROBLEM — R18.0 ASCITES, MALIGNANT: Status: ACTIVE | Noted: 2019-01-01

## 2019-01-23 PROBLEM — C25.9 PANCREATIC CANCER METASTASIZED TO LUNG (HCC): Status: ACTIVE | Noted: 2019-01-01

## 2019-01-23 NOTE — TELEPHONE ENCOUNTER
Notified that she has arrived at [facility] [place], on her way to ED. Notified \"ED Triage\" of patients impending arrival.  Advised that she is profoundly weak, likely needs a paracentesis, has fluid retention.   Has stage 4 pancreatic cancer, progressed through treat

## 2019-01-24 PROBLEM — Z71.89 ADVANCE CARE PLANNING: Status: ACTIVE | Noted: 2019-01-01

## 2019-01-24 PROBLEM — Z71.89 GOALS OF CARE, COUNSELING/DISCUSSION: Status: ACTIVE | Noted: 2019-01-01

## 2019-01-24 NOTE — DIETARY NOTE
ADULT NUTRITION INITIAL ASSESSMENT    Pt is at moderate nutrition risk. Pt meets malnutrition criteria.       CRITERIA FOR MALNUTRITION DIAGNOSIS:  Criteria for non-severe malnutrition diagnosis: chronic illness related to wt loss 5% in 1 month, body fat m hormone dependent, treated with liposuction   • PONV (postoperative nausea and vomiting)      ANTHROPOMETRICS:  HT: 167.6 cm (5' 6\")  WT: 72 kg (158 lb 11.2 oz) Masked by ascites  BMI: Body mass index is 25.61 kg/m².   BMI CLASSIFICATION: 25-29.9 kg/m2 - o muscle mass temple region and clavicle region per visual exam.  - Fluid Accumulation: ascites and lower extremity edema--+4 bilateral LE per RN documentation.  - Skin Integrity: at risk and RN documentation reviewed.   - Emmanuel score 18  Low skin risk    NU

## 2019-01-24 NOTE — CM/SW NOTE
YAMEL met with the pt. And her friend (who is returning to [facility] tomorrow)  At bedside. The pt. Was recently in [facility] and became weak and came to the hospital.  The pt.  Lives alone in a 1st floor condo with 6 steps to enter from the front and 3 from the ga

## 2019-01-24 NOTE — PLAN OF CARE
Admitted from ED. Patient took [facility] from [facility] to be evaluated here. Patient has bilateral lower extremity edema and abdominal ascites. States that appetite has decreased and she has lost weight.  States she has had a difficult time walking around, now

## 2019-01-24 NOTE — ED INITIAL ASSESSMENT (HPI)
Patient has been living in between the [facility] and [place] for the past year. Patient came to the [facility] today by air ambulance, was picked up at midway by [facility] ambulance for Ascites work up. Patient c/o mild abd pain, distension and constipation.  LBM today

## 2019-01-24 NOTE — IMAGING NOTE
80 PT TO ULTRASOUND ROOM  SCANS COMPLETED BY US JENNIFER WALDRON PROCEDURE EXPLAINED QUESTIONS ANSWERED. PT CONSENTED ON THE FLOOR 1010    PT TO GET ALBUMIN 25% 25 GRAM YES OR NO      MARGUERITE WHITEHEAD  HERE, SCANNING COMPLETED.

## 2019-01-24 NOTE — ED NOTES
Care endorsed to [facility]  Room dirty at this time  Pt updated on plan of care

## 2019-01-24 NOTE — ED PROVIDER NOTES
Patient Seen in: [facility] AND [facility] Emergency Department    History   Patient presents with:  Abdominal Pain    Stated Complaint: ASCITES    HPI    She presents the emergency department today complaining of \"ascites\".   Patient has a history of metastati Years since quittin.9      Smokeless tobacco: Former User    Alcohol use:  Yes      Alcohol/week: 0.0 oz      Comment: one per month    Drug use: Yes      Types: Cannabis      Comment: Few times a week      Review of Systems    Positive for stated c components:    ALT 11 (*)     Bilirubin, Total 1.3 (*)     Total Protein 5.4 (*)     Albumin 3.0 (*)     Bilirubin, Direct 0.3 (*)     All other components within normal limits   URINALYSIS WITH CULTURE REFLEX - Abnormal; Notable for the following componen on 1/23/2019 at 20:12     Approved by (CST): Meg Capps MD on 1/23/2019 at 20:14            Radiology exams  Viewed and reviewed by myself and findings discussed with patient including need for follow up    Admission disposition: 1/23/2019  8:32 PM

## 2019-01-24 NOTE — CONSULTS
[address] El[address] Saxena Patient Status:  Inpatient    1945 MRN [id number]   Location [facility] 4W/SW/SE Attending Inés Simmons MD   Hosp Day # 1 PCP Levar Ames MD     Date of Consu is significant as shown below and also for hepatitis C positive, atrial flutter on Amiodarone, COPD, portal vein thrombosis on Xarelto, and hypertension.      In addition to the one night hospitalization in [facility], she was also hospitalized here in April 20 lost 11 pounds since 12/13/2018. She denies any current problems with constipation although says that she had a loose stool yesterday and today.      When asked about problems with sadness or depression, she admits to feeling scared about her recent [facility] Liza Velez as her #1 HCPOA with her nephew Ingrid Mathews named as her successor agent.  She confirmed that this document is still in line with her preferences.     There is also a completed POLST form in Epic confirming wishes for DNR/DNI and no medically admini nystatin (MYCOSTATIN) suspension 500,000 Units, 5 mL, Oral, QID  •  fluconazole (DIFLUCAN) tab 100 mg, 100 mg, Oral, Daily  •  [START ON 1/25/2019] furosemide (LASIX) tab 20 mg, 20 mg, Oral, Daily  •  Prochlorperazine Maleate (COMPAZINE) tab 10 mg, 10 mg, 20: 12     Approved by (CST): Anselmo Barthel, MD on 1/23/2019 at 20:14            Objective:  Vital Signs:  Blood pressure 117/48, pulse 78, temperature 98.3 °F (36.8 °C), temperature source Oral, resp.  rate 18, height 5' 6\" (1.676 m), weight 158 lb 11.2 oz referral for community palliative care  -Provided emotional support to pt who seems to be coping adequately  -See above narrative for further details      Advance Care Planning counseling and discussion:  -Pt is already DNR/DNI - valid POLST form can be fo

## 2019-01-24 NOTE — H&P
[facility] Hospitalist H&P     CC: Patient presents with:  Abdominal Pain       PCP: Kb Obrien MD    Admission Date: 1/23/2019    ASSESSMENT / PLAN:   Ms. Greg Arreguin is a 72year old female with PMH of stage IV pancreatic cancer, COPD, Hep C who presented via eventually return home    FN:  - IVF: none  - Diet: general    DVT Prophy: xarelto  Lines: PIV, port    Dispo: pending clinical course    Outpatient records or previous hospital records reviewed.      Further recommendations pending patient's clinical cours ULTRASOUND (EUS) N/A 3/27/2017    Performed by Thelma Otto MD at [address] N/A 5/16/2017    Performed by Joann Aiken MD at [facility] ENDOSCOPY   • IMPLANT LEFT  1970s   • IMPLANT RIGHT  1970s   • PANCREATECTOMY,DISTAL+NV 40.00        Pack years: 36        Quit date: 2017        Years since quittin.9      Smokeless tobacco: Former User    Alcohol use:  Yes      Alcohol/week: 0.0 oz      Comment: one per month       Fam Hx  Family History   Problem Relation Age of Ons AST 23 01/23/2019    ALT 11 01/23/2019    PTT 33.7 01/23/2019    INR 2.3 01/23/2019    LIP 21 01/23/2019       No results for input(s): TROP in the last 168 hours.     CXR: image personally reviewed no consolidation or effusions, right port    Radiology: Xr

## 2019-01-24 NOTE — ED NOTES
Received pt a/ox3, clear speech, nad, no resp distress, ambulatory with steady gait  Here with c/o ascites, constipation and abdominal discomfort.    Pt traveling back and forth to [place], returned today from December 31 for further eval    Placed on all co

## 2019-01-24 NOTE — ED NOTES
Pt placed on bedpan, output approx 300 mol urine. Sent for processing  Power port accessed per this RN using sterile technique.  Labs sent for processing  Pt continues resting in cart with nad, no resp distress, no new complaints  Will monitor

## 2019-01-24 NOTE — CONSULTS
Oncology Consultation Note    Patient Name: Christine Lamas   YOB: 1945   Medical Record Number: [id number]   CSN: [id number]   Consulting Physician: Maki Nelson MD  Referring Physician(s): Tisha Reed MD  Date of Consultation: 1/24/20 PANCREATECTOMY,DISTAL+PRESERV DUOD N/A     partial resection    • TONSILLECTOMY  1949    <age 14 years       Family Medical History:  Family History   Problem Relation Age of Onset   • Cancer Mother 76        colon cancer   • Other (Other) Mother         c yrs; previously  30 yrs. She has no children; close to her stepdaughter in [place], [place]. Her sister, Harmeet Kate, lives in [place], [place]. [place] lives alone in [place], [place]. 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 supraclavicular, axillary, or inguinal regions. Chest: Clear to auscultation. No wheezes or rales. Heart: Regular rate and rhythm. S1S2 normal.  Abdomen: Soft, non tender with good bowel sounds.  Largely distend abdomen  Extremities: No edema or calf tend removing ascites fluid will likely improve her nausea vomiting symptoms with eating    --Planning to increase her Remeron to 30 mg for her decreased appetite symptoms    --Patient only had 2 episodes of loose stools yesterday and mild abdominal cramping, u

## 2019-01-25 NOTE — PLAN OF CARE
Problem: Patient Centered Care  Goal: Patient preferences are identified and integrated in the patient's plan of care  Interventions:  - Provide timely, complete, and accurate information to patient/family  - Incorporate patient and family knowledge, value Progressing      Problem: SAFETY ADULT - FALL  Goal: Free from fall injury  INTERVENTIONS:  - Assess pt frequently for physical needs  - Identify cognitive and physical deficits and behaviors that affect risk of falls.   - [place] fall precautions as paul Evaluate fluid balance  Outcome: Progressing    Goal: Maintains or returns to baseline bowel function  INTERVENTIONS:  - Assess bowel function  - Maintain adequate hydration with IV or PO as ordered and tolerated  - Evaluate effectiveness of GI medications

## 2019-01-25 NOTE — OCCUPATIONAL THERAPY NOTE
OCCUPATIONAL THERAPY EVALUATION - INPATIENT     Room Number: 472/472-A  Evaluation Date: 1/25/2019  Type of Evaluation: Initial  Presenting Problem: (ascites malignant)    Physician Order: IP Consult to Occupational Therapy  Reason for Therapy: ADL/IADL Dy rehab.    Pt left in bed, alarm set, friend present, all needs in reach. Pt is below fx baseline, deconditioned, and will benefit from rehab stay once medically stable and d/c from [address] [place] [address].       DISCHARGE RECOMMENDATIONS  OT Discharge Recommendations: Sub-acut Regularly Uses: Glasses    Stairs in Home: 0  Use of Assistive Device(s): cane, rollator, shower chair, RTS, grab bars     Prior Level of [place]: Per pt report, she lives alone in single story [facility].  She was mod I with ADL/IADLs using cane in home an tested  Sit to Stand:  Moderate assistance  Toilet Transfer: min A   Shower Transfer: NT  Chair Transfer: mod a     Bedroom Mobility: CGA with RW     BALANCE ASSESSMENT  Static Sitting: fair+  Dynamic Sitting: fair  Static Standing: fair  Dynamic Standing:

## 2019-01-25 NOTE — PROGRESS NOTES
[facility] Hospitalist Progress Note     Reason for Admission: abdominal distention  PCP: Marco Antonio Alex MD     Assessment/Plan:     Principal Problem:    Ascites, malignant  Active Problems:    Pancreatic cancer metastasized to lung [facility])    Goals of care, coun daily     #GOC:  -was not ready for hospice at initial palliative encounter, DNR, no plan for further chemo  -palliative care consulted  -patient wishes to return be d/c to SNF while finding a 24 hr caregiver, plan to eventually return home     FN:  - IVF: 01/25/19   0556   GLU  92  85  115*   BUN  9  9  6*   CREATSERUM  0.81  0.72  0.61   GFRAA  >60  >60  >60   GFRNAA  >60  >60  >60   CA  8.9  8.6  8.7   NA  135*  134*  134*   K  3.0*  3.3  3.7  3.7   CL  95  96  101   CO2  23  24  25       Recent Labs   La

## 2019-01-25 NOTE — PROGRESS NOTES
[place] [facility] - [facility]  Palliative Care Follow Up    Derrick Monet Patient Status:  Inpatient    1945 MRN [id number]   Location [facility] 4W/SW/SE Attending Edward Gusman MD   Hosp Day # 2 PCP Dale Tom MD     Date of Consult:  hiring a care-giver once she gets to [address] [place] [address]. I encouraged [place] and Guera Guzman to call me if I can be of any assistance. Review of Systems/Symptom Management:  See above narrative for pertinent ROS questions.     Objective:  Vital Signs:  Bloo mg, Oral, Nightly  •  Rivaroxaban (XARELTO) tab 20 mg, 20 mg, Oral, Nightly  •  Normal Saline Flush 0.9 % injection 3 mL, 3 mL, Intravenous, PRN  •  acetaminophen (TYLENOL) tab 650 mg, 650 mg, Oral, Q6H PRN  •  docusate sodium (COLACE) cap 100 mg, 100 mg, Approved by (CST): Anel Zapata MD on 1/23/2019 at 20:14            Palliative Performance Scale : 50%      Palliative Care Goals of Care:  Discussed with Patient and Family: Yes  Patient's preference about sharing medical information: [place] re in the care of your patient. Will follow-up with patient if still hospitalized on Monday. For any arising palliative care needs over the weekend, please page Dr. Grant.      Gerri GIRARD, MARKIE-BC, [facility], [id number]  1/25/2019  11:05 AM

## 2019-01-25 NOTE — CM/SW NOTE
YAMEL met with the pt. To discuss rehab options. [facility] has accepted the pt. And will likely only have a semi-private bed available this weekend. The pt.  Is agreeable to going to [facility] if she will only share a room for a few days at most.  MYRON

## 2019-01-25 NOTE — PHYSICAL THERAPY NOTE
PHYSICAL THERAPY EVALUATION - INPATIENT     Room Number: 472/472-A  Evaluation Date: 1/25/2019  Type of Evaluation: Initial   Physician Order: PT Eval and Treat    Presenting Problem: p/w abdominal distension, decreased appetite; dx ascites  Reason for Th documentation in the [facility] '6 clicks' Inpatient Basic Mobility Short Form. Research supports that patients with this level of impairment may benefit from rehab stay.  As pt lives alone and requires CGA to MOD A for mobility, pt is currently NOT SAFE for re of Home: Condo   Home Layout: One level  Stairs to Enter : 3     Stairs to [employer]: 0       Lives With: Alone  Drives: Yes  Patient Owned Equipment: Cane;Rolling walker  Patient Regularly Uses: Glasses    Prior Level of [place]: independent with ADLs, railing?: A Lot     AM-PAC Score:  Raw Score: 14   Approx Degree of Impairment: 61.29%   Standardized Score (AM-PAC Scale): 38.1   CMS Modifier (G-Code): CL    FUNCTIONAL ABILITY STATUS  Gait Assessment   Gait Assistance: Contact guard assist  Distance (ft

## 2019-01-26 NOTE — DISCHARGE SUMMARY
[facility] Hospitalist Discharge Summary   Patient ID:  Gera Diane [id number]  72year old 9/7/1945    Admit date: 1/23/2019  Discharge date: 1/26/2019  Risk of Readmission Lace+ Score: 74  59-90 High Risk  29-58 Medium Risk  0-28   Low Risk    Primary Care eventually go back home with a home caregiver but preferred to be discharged to SNF while acquiring more home resources.  The rest of the hospital course is detailed by problem list below.     #Stage IV Pancreatic Adenocarcinoma:  -follows with Dr. Farshad freitas Regular, no murmur, port in place over right chest wall  ABD: soft, NT, ND, BS+, +ascites  EXTREMITIES: no edema, no calf tenderness, SCD in place    Operative Procedures:   Radiology:   Us Paracentesis Abdomen W Imaging  ([id number])    Result Date: 1/24/2 ALPRAZolam 1 MG Tabs  Commonly known as:  XANAX  TAKE 1 TABLET EVERY NIGHT AS NEEDED FOR SLEEP     amiodarone HCl 200 MG Tabs  Commonly known as:  PACERONE  Take 1 tablet (200 mg total) by mouth every other day.      Calcium Carbonate Antacid 500 MG Chew [address] [place] [address] Juan Asif MD In 2 weeks.     Specialty:  Internal Medicine  Contact information:  [address] [address] [phone]                 Disposition: SNF  Discharged Condition: fair    Tot

## 2019-01-26 NOTE — PROGRESS NOTES
[facility]    Progress Note    Mack Ly Patient Status:  Inpatient    1945 MRN [id number]   Location [facility] 4W/SW/SE Attending Stacy Matias MD   Hosp Day # 3 PCP Kb Obrien MD     Patient with metastatic cummins cancer currently off systemic treatment presents from ED after returning from trip from [facility] with abdominal distention complaints producing nausea vomiting symptoms with eating     Plan:     --pt feels improved s/p ultrasound guided paracentesis with marilyn [address]              Results:     Lab Results   Component Value Date    WBC 11.9 (H) 01/26/2019    HGB 11.8 (L) 01/26/2019    HCT 36.6 01/26/2019     01/26/2019    CREATSERUM 0.59 01/26/2019    BUN 6 (L) 01/26/2019    NA

## 2019-01-26 NOTE — PLAN OF CARE
Problem: Patient/Family Goals  Goal: Patient/Family Long Term Goal  Patient's Long Term Goal: return home    Interventions:  - monitor labs  - continue with plan of care  - See additional Care Plan goals for specific interventions   Outcome: Progressing appropriate  - Consider OT/PT consult to assist with strengthening/mobility  - Encourage toileting schedule  Outcome: Progressing      Problem: DISCHARGE PLANNING  Goal: Discharge to home or other facility with appropriate resources  INTERVENTIONS:  - Iden Impaired Functional Mobility  Goal: Achieve highest/safest level of mobility/gait  Interventions:  - Assess patient's functional ability and stability  - Promote increasing activity/tolerance for mobility and gait  - Educate and engage patient/family in to

## 2019-01-26 NOTE — PROGRESS NOTES
[facility]    Progress Note    Molly Brady Patient Status:  Inpatient    1945 MRN [id number]   Location [facility] 4W/SW/SE Attending Ramon Price MD   Hosp Day # 2 PCP Damaris Millan MD     Patient with metastatic cummins Judgment and thought content normal.           Assessment and Plan:   72year old F with metastatic pancreatic cancer currently off systemic treatment presents from ED after returning from trip from [facility] with abdominal distention complaints producing jan Results:     Lab Results   Component Value Date    WBC 11.8 (H) 01/25/2019    HGB 11.7 (L) 01/25/2019    HCT 36.1 01/25/2019     01/25/2019    CREATSERUM 0.61 01/25/2019    BUN 6 (L) 01/25/2019     (L) 01/25/2019    K 3.7 01/25/2019    K 3.

## 2019-01-26 NOTE — CM/SW NOTE
RN informed SW that pt is medically stable to discharge today, pt requesting medicar transport. SW informed daughter Nafisa Chambers regarding discharge time and provide [facility] phone # to make payment (quote $39).    YAMEL spoke w/ Jo from [employer] and arranged med

## 2019-01-28 NOTE — TELEPHONE ENCOUNTER
Returned call to [place], she is in rehab and has not eaten much in 3 days due to vomiting, dark bile, unable to keep anything down, to see APN shortly at rehab for evaluation.   I reassured [place] that APN will evaluate her and make recommendations for

## 2019-01-28 NOTE — TELEPHONE ENCOUNTER
[place] calling she has been vomiting up black stuff. She is at [facility] rehab center. They are aware of her vomitting and have given her meds for that which she can not keep down.  She is waiting for apn to see her. fercho

## 2019-02-01 NOTE — TELEPHONE ENCOUNTER
[facility] hospice called saying that [place] is going to be going into hospice and was seeing if  wanted to continue as her PCP

## 2019-02-18 NOTE — TELEPHONE ENCOUNTER
Adan Paul had question about her appointments here being cancelled, she understood why but wasn't sure if Dr Dari Martin knew her mom was in hospice.   I confirmed with Adan Paul that we were notified on 2/1/19 that her mom was admitted to [facility] and we cancell

## 2019-02-18 NOTE — TELEPHONE ENCOUNTER
Virginias daughter called to inform that that [place] has gone into hospice care and was wondering what the treatment plan should be going forward.  Please advise

## 2022-09-09 NOTE — PROGRESS NOTES
As above     [facility] AND [facility]  Hematology/Oncology  Progress Note    Christine Lamas CSN: [id number]    1945 MRN [id number]   Location [address] [facility] Attending Maki Nelson MD   Date of Visit 18 PCP Adonay Goodman MD     Chief Complaint: Kayla Mar by a pneumothorax and is currently recovering with mild dyspnea. She was incidentally screened for hepatitis C and found to be positive.  [place] underwent surgical pancreatic resection on 7/217 at [facility] by Dr. Mina Dumont after receiving 1 cycle of F She's suffered treatment associated alopecia. Patient denies any fevers or chills. Denies any chest pain, dyspnea, nausea, vomiting, or bloating.   She continues to remain quite active and independent despite her metastatic pancreatic cancer, all other revi enjoys dining out, swimming, enjoys yoga, spending time with friends. [place] previously lived in [facility] full time, but now spends about 3-4 months there.           Review of Systems:    Constitutional: Negative for anorexia, chills, fevers, night sweats a Neurological: Grossly intact. Performance Status:    ECOG 1: Restricted in physically strenuous activity but ambulatory and able to carry out work of a light or sedentary nature.      Allergies  No Known Allergies        Current Outpatient Prescript daily as needed. Disp: 90 tablet Rfl: 3   Fluticasone Propionate 50 MCG/ACT Nasal Suspension by Each Nare route daily.  Disp:  Rfl:        Imaging:    N/A    Assessment and Plan:  Patient Active Problem List:     Thyroid nodule     Family history of celiac developed significant diarrhea with extended hospital course following treatment making additional chemotherapy sessions unavailable  --Patient's tumor marker has decreased from 142.5 prior to cycle 1 of systemic treatment to 85   --CT scan in 6/2017 showanu palliative FOLFOX chemotherapy for stage IV disease treatment as her tumor did not respond well to prior combination gemcitabine therapy      --We've sent the patient's tumor profile for [facility] testing and found to have limited actionable muta tomorrow    --Pt will bring her CT results and treatment plans to [facility] as she's planned to follow up with Dr. Leona Omalley, will also discuss other systemic treatment options with Dr. Dorothy Barba. She has a copy of her [facility] report.     --Planning prescription daily  --has prn Carafate therapy as well to help khadijah this symptom  --Improved currently    6.) PE    --Found incidentally on restaging imaging on 1/10/2018; recommended for 6 months anticoagulation at least (7/2018)  --Pt reports that she d

## 2023-01-03 NOTE — TELEPHONE ENCOUNTER
Per Dr Sharda Bhatt instruction: patient contacted and advised that PET from today shows 2 small bilateral pulmonary emboli. [place] states that she has been experiencing increased shortness of breath with exertion.  No shortness of breath at rest. No chest pain Colchicine Counseling:  Patient counseled regarding adverse effects including but not limited to stomach upset (nausea, vomiting, stomach pain, or diarrhea).  Patient instructed to limit alcohol consumption while taking this medication.  Colchicine may reduce blood counts especially with prolonged use.  The patient understands that monitoring of kidney function and blood counts may be required, especially at baseline. The patient verbalized understanding of the proper use and possible adverse effects of colchicine.  All of the patient's questions and concerns were addressed.

## (undated) DEVICE — ENDOSCOPY PACK - LOWER: Brand: MEDLINE INDUSTRIES, INC.

## (undated) DEVICE — FILTERLINE NASAL ADULT O2/CO2

## (undated) DEVICE — TRAP MCS 40ML 5IN PLS SCR CAP

## (undated) DEVICE — ENDOSCOPY PACK UPPER: Brand: MEDLINE INDUSTRIES, INC.

## (undated) DEVICE — ECHOTIP ULTRA: Brand: ECHOTIP

## (undated) DEVICE — FORCEP RADIAL JAW 4

## (undated) NOTE — MR AVS SNAPSHOT
[address] [place] [address]   2017 10:00 AM   Nurse Only   MRN:  [id number]    Description:  Female : 1945   Department:  [facility] - Infusion              Visit Summary      Primary Visit Diagnosis     Chemotherapy managemen GFR, Non- >60  >=60   Final    GFR, -American >60  >=60   Final    Comment:       Chronic Kidney Disease: GFR <60 ml/min/1.73 m2  Kidney failure: GFR <15 ml/min/1.73 m2    The accuracy of the MDRD equation is not suitable for acute

## (undated) NOTE — MR AVS SNAPSHOT
Everet Boot   2017 1:00 PM   Office Visit   MRN:  [id number]    Description:  Female : 1945   Provider:  Lianna Bland   Department:  [facility] AND [facility] Hematology Oncology              Visit Summary      Primary Visit Diagnosis an Open Medical Record policy. We can provide you with your current medication list and lab results today.   If you would like to review your medical record, we can assist you to set up an appointment with the appropriate medical professional to review you view more details from this visit by going to [email or web address]. If you've recently had a stay at the Hospital you can access your discharge instructions in [employer] by going to Visits < Admission Summaries.  If you've been to the Emergency Depar

## (undated) NOTE — LETTER
Printed: 2018    Patient Name: Marisol Torre  : 1945   Medical Record #: [id number]    Consent to [place], understand that I have been diagnosed with pancreatic cancer (stage IV).     I understand that the tr if I have questions, by calling [phone]. Additional written information will be given to me prior to start of therapy. Additionally, I will receive a copy of this consent form. I have read and fully understand this consent to cancer treatment.

## (undated) NOTE — MR AVS SNAPSHOT
[address] [place] [address]   2017 3:00 PM   Office Visit   MRN:  [id number]    Description:  Female : 1945   Department:  [facility] - Infusion              Visit Summary      Primary Visit Diagnosis     Chemotherapy manageme [address] [address]       Monday May 22, 2017 8:30 AM     Appointment with EM ALBARO INFRN 3 at [address] [place] [address] ([phone])   [address]   [facility] [address]       Wednesday May 24, 2017 9:00 AM     Appointment with JOSE C IRVIN IN

## (undated) NOTE — MR AVS SNAPSHOT
[address] [place] [address]   2017 8:30 AM   Office Visit   MRN:  [id number]    Description:  Female : 1945   Department:  [address] [place] [address] - [facility]              Visit Summary      Primary Visit Diagnosis     Malignant neoplasm of medication and for 48 hours after the last dose. 5. Wash your hands after using the toilet. 6. Wash any skin area that has come in contact with urine or stool. Safety for my family and friends:  1.  Hugging and kissing is safe for you and your partner Wednesday May 10, 2017 10:00 AM     Appointment with JOSE C IRVIN INFRN 1 at [address] [place] [address] ([phone])   [address]   [facility] [address]       Friday May 19, 2017 9:30 AM     Appointment with JOSE C IRVIN LAB2 at [address] [place] [address]

## (undated) NOTE — Clinical Note
Printed: 2017    Patient Name: Mitul Marshall  : 1945   Medical Record #: [id number]    Consent to [address] [place] [address], understand that I have been diagnosed with pancreatic adenocarcinoma (stage questions have been answered to my satisfaction. I understand that I can contact my oncologist, Sabrina Sanford, or my Cancer Care Team at any time if I have questions, by calling [phone].    Additional written information will be given to me prior to s

## (undated) NOTE — LETTER
Printed: 2018    Patient Name: Gera Diane  : 1945   Medical Record #: [id number]    Consent to Hardy, understand that I have been diagnosed with pancreatic cancer (stage IV).     I understand that the contact my oncologist, Luis Lawson, or my Cancer Care Team at any time if I have questions, by calling [facility] at [phone]. Additional written information will be given to me prior to start of therapy.     Additionally, I will receive

## (undated) NOTE — IP AVS SNAPSHOT
[address]  [address] [facility] [phone]                Discharge Summary   4/27/2017    Suzetet Sharif           Admission Information        Provider Department    4/27/2017 Juany Medina MD [facility] NICORETTE STARTER KIT 2 MG Gum   Generic drug:  nicotine polacrilex        Take 2 mg by mouth as needed for Smoking cessation.                          Ondansetron HCl 8 MG tablet   Commonly known as:  ZOFRAN        Take 1 tablet (8 mg total) by [facility] AND CLINICS Hematology Oncology [facility])    [place] [address]   [address] [place] [address]   [phone]            May 22, 2017  8:30 AM   CHEMO INFUSION CHEMOTHERAPY with EM CC INFRN [address] [facility] - Infusion (Tod Sos 1 (04/27/17)  0 (04/27/17)  0  (04/27/17)  23.0 (H) (04/27/17)  1.9 (04/27/17)  0.2 (04/27/17)  0.0 (04/27/17)  0.1    (04/11/17)  50.9 (04/11/17)  37.2 (04/11/17)  8.4 (04/11/17)  2.8 (03/23/17)  0.8  (04/11/17)  4.92 (04/11/17)  3.60 (04/11/17)  0.81 (H) Summaries. If you've been to the Emergency Department or your doctor's office, you can view your past visit information in [employer] by going to Visits < Visit Summaries. OneMob questions? Call [phone] for help.   [employer] is NOT to be used for urge Most common side effects: Headache, nasal irritation, palpitations, increased heart rate, increased blood pressure, allergic reaction, yeast infection of the mouth/tongue   What to report to your healthcare provider: Head or mouth pain, coating on mouth/to

## (undated) NOTE — LETTER
Printed: 2018    Patient Name: Brady Zhang  : 1945   Medical Record #: [id number]    Consent to Hardy, understand that I have been diagnosed with pancreatic cancer (stage IV).     I understand that the contact my oncologist, Eliot Baltazar, or my Cancer Care Team at any time if I have questions, by calling [facility] at [phone]. Additional written information will be given to me prior to start of therapy.     Additionally, I will receive

## (undated) NOTE — MR AVS SNAPSHOT
[address] [place] [address]   2017 2:00 PM   Office Visit   MRN:  [id number]    Description:  Female : 1945   Department:  [facility] - [facility]              Visit Summary      Primary Visit Diagnosis     Chemotherapy managemen Appointment with JOSE C CC LAB1 at [address] [place] [address] ([phone])   [address]   [address] [place] [address]       Friday May 19, 2017 10:30 AM     Appointment with Yosef Franks at [facility] AND CLINICS Hematology Oncology ([phone] Total Protein 6.4  5.9-8.4  g/dL Final    Albumin 3.3 (L) 3.5-4.8  g/dL Final    Globulin 3.1  2.5-3.7  g/dL Final    A/G Ratio 1.1  1.0-2.0   Final    Anion Gap 12  0-18   Final    BUN/CREA Ratio 15.6  10.0-20.0   Final    Calculated Osmolality 268 (L) 2 Summaries. If you've been to the Emergency Department or your doctor's office, you can view your past visit information in [place] by going to Visits < Visit Summaries. [place] questions? Call [phone] for help.   [place] is NOT to be used for urge

## (undated) NOTE — MR AVS SNAPSHOT
[address] [place] [address]   2017 1:00 PM   Office Visit   MRN:  [id number]    Description:  Female : 1945   Provider:  Chris Andino   Department:  [facility] AND [facility] Hematology Oncology              Visit Summary      Primary Visit Diagnosis Visits < Visit Summaries. MyChart questions? Call [phone] for help. [employer] is NOT to be used for urgent needs. For medical emergencies, dial 911.

## (undated) NOTE — LETTER
Printed: 2017    Patient Name: Suzette Sharif  : 1945   Medical Record #: [id number]    Consent to [place], understand that I have been diagnosed with pancreatic cancer (stage IIB).     I understand that the t contact my oncologist, Wendy Stringer, or my Cancer Care Team at any time if I have questions, by calling [phone]. Additional written information will be given to me prior to start of therapy.     Additionally, I will receive a copy of this consent fo

## (undated) NOTE — MR AVS SNAPSHOT
[address] [place] [address]   2017 2:30 PM   Nurse Only   MRN:  [id number]    Description:  Female : 1945   Department:  [facility] - Infusion              Visit Summary      Primary Visit Diagnosis     Malignant neoplasm of b appropriate medical professional to review your records. To Do List     Friday May 05, 2017 1:00 PM     Appointment with EM ALBARO LAB2 at [address] [place] [address] ([phone])   [address]   [facility] [address]       Friday May Summaries. If you've been to the Emergency Department or your doctor's office, you can view your past visit information in [employer] by going to Visits < Visit Summaries. [employer] questions? Call [phone] for help.   [employer] is NOT to be used for urge

## (undated) NOTE — IP AVS SNAPSHOT
[facility] [place]  [address], [address] ~ [phone]                Discharge Summary   3/27/2017    Milena Wright           Admission Information        Provider Department    3/27/2017 Jeovany Bowers MD  Endoscopy normal. Gargling with warm salt water (1/2 tsp salt to 1 glass warm water) or using throat lozenges will help.   - If you experience any sharp pain in your neck, abdomen or chest, vomiting of blood, oral temperature over 100 degrees Fahrenheit, light-headed HgbA1C Glucose BUN Creatinine Calcium Alkaline Phosph AST    -- (03/23/17)  103 (H) (03/23/17)  11 (03/23/17)  0.92 (03/23/17)  10.1 (03/23/17)  84 (03/23/17)  70 (H)      Metabolic Lab Results  (Last result in the past 90 days)    ALT Bilirubin,Total Tot office, you can view your past visit information in [employer] by going to Visits < Visit Summaries. [employer] questions? Call [phone] for help. [employer] is NOT to be used for urgent needs. For medical emergencies, dial 911.

## (undated) NOTE — MR AVS SNAPSHOT
[address] [place] [address]   2017 3:30 PM   Office Visit   MRN:  [id number]    Description:  Female : 1945   Provider:  Shira Manrique   Department:  [facility] AND [facility] Hematology Oncology              Visit Summary      Primary Visit Diagnosis ([phone])   [address] [place] [address]       Monday May 22, 2017 8:30 AM     Appointment with JOSE C DREW 3 at [address] [place] [address] ([phone])   [address]   [facility] [address]       Wednesday May 24, 20

## (undated) NOTE — LETTER
Printed: 2018    Patient Name: Mandy Guo  : 1945   Medical Record #: [id number]    Consent to Hardy, understand that I have been diagnosed with pancreatic cancer (stage IV).     I understand that the tr if I have questions, by calling [phone]. Additional written information will be given to me prior to start of therapy. Additionally, I will receive a copy of this consent form. I have read and fully understand this consent to cancer treatment.

## (undated) NOTE — IP AVS SNAPSHOT
Patient Demographics     Address Phone E-mail Address    [address] [place] [address] Dr Lore Crespo [phone] [facility]  [phone] [facility] [email or web address]      Emergency Contact(s)     Name Relation Home Work Mobile    Hannah Goodman Relative   65 Last time this was given:  5 mg on 5/19/2017  8:00 AM   Commonly known as:  ELIQUIS        Take 1 tablet (5 mg total) by mouth 2 (two) times daily.     Nurys Guillen                              Calcium Carbonate Antacid 500 MG Chew   Last time this was Take 1 tablet (40 mg total) by mouth 2 (two) times daily before meals.     Chani Renee                              Potassium Chloride ER 20 MEQ Tbcr   Last time this was given:  40 mEq on 5/15/2017  9:45 AM   Commonly known as:  K-DUR M20        Take [id number] Umeclidinium Bromide (INCRUSE ELLIPTA) 62.5 MCG/INH inhaler 1 puff 05/19/17 0801 Given      [id number] amiodarone HCl (PACERONE) tab 200 mg 05/19/17 0801 Given      [id number] apixaban (ELIQUIS) tab 5 mg 05/18/17 2106 Given      [id number] apixaban Specimen Information    Type Source Collected On   Blood  05/17/17 1217          Components       Value Reference Range [facility]   Cytomegalovirus (CMV) IgG Positive Negative  A [place] [facility]   Cytomegalovirus (CMV) IgM Negative Negative   [place] [facility]   C Value Reference Range [facility]   Magnesium 1.6 1.8-2.5 mg/dL [facility] [place] [facility]            CBC WITH DIFFERENTIAL WITH PLATELET [[id number]]  Resulted: 05/19/17 0556, Result status: Final result    Ordering provider: Maday Stevenson MD  05/18/17 7842 Resulting Specimen Information:  Blood from Blood,peripheral      Blood Culture Result No Growth 5 Days     Blood Culture FREQ X 2 [[id number]] Collected:  05/03/17 1252    Order Status:  Completed Lab Status:  Final result Updated:  05/08/17 1400    Specimen Inform Urine Culture, Routine Once [[id number]] Collected:  05/03/17 0042    Order Status:  Completed Lab Status:  Final result Updated:  05/04/17 1101    Specimen Information:  Urine from Urine, clean catch      Urine Culture        Result:        <10,000 cfu/ml Pantoprazole Sodium 40 MG Oral Tab EC Take 1 tablet (40 mg total) by mouth 2 (two) times daily before meals.  Disp: 30 tablet Rfl: 3     Past Medical History   Diagnosis Date   • Essential hypertension 7/19/2015   • COPD (chronic obstructive pulmonary disea [address] [facility] Patient Status:  Inpatient    1945 MRN [id number]   Location [facility] 4W/SW/SE Attending Maury Villa MD   [facility] Day # 15 PCP Ximena Harris MD     Date of Admission:  2017  Date of Consult:  2017    Reason Current Medications:    Current Facility-Administered Medications:  diphenoxylate-atropine (LOMOTIL) liquid 5 mL 5 mL Oral QID PRN   Loperamide HCl (IMODIUM) cap 2 mg 2 mg Oral QID PRN   ceFAZolin (ANCEF) IVPB 1g/100ml in 0.9% NaCl minibag/add-van 1 g Intr nicotine polacrilex (NICORETTE STARTER KIT) 2 MG Mouth/Throat Gum Take 2 mg by mouth as needed for Smoking cessation. mirtazapine 15 MG Oral Tab Take 1 tablet (15 mg total) by mouth nightly.    Ondansetron HCl (ZOFRAN) 8 MG tablet Take 1 tablet (8 mg tota colitis/enteritis. Antibiotic associated and/or opportunistic infection also possibility. Flex sig will be done today with biopsy  Agree with management thus far.   Will check fecal fat as well although pancreatic insufficiency as a cause of this type of d PT Treatment Plan: Bed mobility; Endurance; Patient education;Gait training;Strengthening;Transfer training    SUBJECTIVE  \"I'll try but my legs are tired! \"    OBJECTIVE   pt is min A for mobility secondary to LE weakness & unsteadiness.     WEIGHT BEARING Position Sitting and Supine 10xea       Patient End of Session: Up in chair    CURRENT GOALS   Patient Goal  Patient's self-stated goal is: get stronger and go home    Goal #1  Patient is able to demonstrate supine - sit EOB @ level: minimal assist      Go pt was up with PT for ambulation was 5/9 for 10 feet and prior to that 5/5 for 25 feet.                  Occupational Therapy Notes (last 72 hours) (Notes from 5/16/2017 12:27 PM through 5/19/2017 12:27 PM)      Occupational Therapy Note by Susannah Ibarra OT Device Recommendations: Raised toilet seat;Grab bars; Shower chair    PLAN  OT Treatment Plan: Balance activities; ADL training;Functional transfer training; Endurance training    SUBJECTIVE  Pt seen up in chair and agreeable for OT session    OBJECTIVE  Patient will complete toileting with mod I     Comment: Min  assist     Patient will tolerate standing for ~5 minutes in prep for adls with mod I     Pt tolerated standing for 2 min      Patient will complete LE dressing min assist     Comment: min assist Pt remained in bed at end of session with all needs within reach.      Patient End of Session: In bed;Needs met;Call light within reach  OT Discharge Recommendations: Sub-acute rehabilitation  OT Device Recommendations: Reacher;Sock aid;Raised toilet seat;G Lower Extremity Dressing: max assist    Education Provided: discussed with pt benefits of activity, role of OT and safe transfers with RW  OT Goals:    Patient will complete functional transfer with mod I    Comment:  CG assist for sit to stand , pt declin

## (undated) NOTE — LETTER
[facility] Testing Department  Phone: [phone]  OUTSIDE TESTING RESULT REQUEST      TO:   Dr. Antione Hooper / Terry Chapman Date: 3/22/17    FAX #: [phone]     IMPORTANT: FOR YOUR IMMEDIATE ATTENTION  Please FAX all test results listed below

## (undated) NOTE — MR AVS SNAPSHOT
[address] [place] [address]   2017 10:30 AM   Office Visit   MRN:  [id number]    Description:  Female : 1945   Provider:  Suzanne Strickland   Department:  [facility] AND [facility] Hematology Oncology              Visit Summary      Allergies     No Known A medical record, we can assist you to set up an appointment with the appropriate medical professional to review your records. Follow-up Instructions     Return if symptoms worsen or fail to improve.       To Do List     Tuesday July 18, 2017 1:00 PM

## (undated) NOTE — MR AVS SNAPSHOT
[address] [place] [address]   2017 11:30 AM   Office Visit   MRN:  [id number]    Description:  Female : 1945   Provider:  Malinda Sheppard   Department:  [facility] Hematology Oncology              Visit Summary      Primary Visit Diagnosis healthcare providers. At [facility] we continue to have an Open Medical Record policy. We can provide you with your current medication list and lab results today.   If you would like to review your medical record, we can assist you to se

## (undated) NOTE — MR AVS SNAPSHOT
Debra Vaca   2017 12:30 PM   Nurse Only   MRN:  [id number]    Description:  Female : 1945   Department:  [facility] - Infusion              Visit Summary      Primary Visit Diagnosis     Chemotherapy managemen requested your test to include oral contrast.  If you need to  oral contrast and your doctor has provided you with a copy of the order, you must bring this order with you to  the oral contrast and drinking instructions.  Please plan on picking abstain from breast-feeding from the time of contrast administration for a period of 12 to 24 hours. There is no value to stop breast feeding beyond 24 hours. [address]  [address] [address]       Monday June 19, 2017 12:30 PM     Appointment Bilirubin, Total 0.8  0.3-1.2  mg/dL Final    Total Protein 6.8  5.9-8.4  g/dL Final    Albumin 3.4 (L) 3.5-4.8  g/dL Final    Globulin 3.4  2.5-3.7  g/dL Final    A/G Ratio 1.0  1.0-2.0   Final    Anion Gap 11  0-18  mmol/L Final    BUN/CREA Ratio 19.6 office, you can view your past visit information in [email or web address] by going to Visits < Visit Summaries. [email or web address] questions? Call [phone] for help. [email or web address] is NOT to be used for urgent needs. For medical emergencies, dial 911.

## (undated) NOTE — IP AVS SNAPSHOT
[address], [place] Roberto Brothers ~ [phone]                Discharge Summary   5/2/2017    Marisol Torre           Admission Information        Provider Department    5/2/2017 Sonny Cote MD [facility] 4w Donelda Esters                                 diphenoxylate-atropine 2.5-0.025 MG Tabs   Last time this was given:  2 tablets on 5/19/2017  8:01 AM   Commonly known as:  LOMOTIL        Take 2 tablets by mouth 3 (three) times daily.    Stop taking on:  5 Take 2 mg by mouth as needed for Smoking cessation. Ondansetron HCl 8 MG tablet   Commonly known as:  ZOFRAN        Take 1 tablet (8 mg total) by mouth every 8 (eight) hours as needed for Nausea.     Malathi Curtis Specialties:  ONCOLOGY, HEMATOLOGY    Contact information:    [place] [address] [phone]          Follow up with Ariadne Albright MD In 2 weeks.     Specialty:  Internal Medicine    Contact information:    [address] (05/18/17)  0 (05/18/17)  1  (05/18/17)  3.2 (05/18/17)  2.3 (05/18/17)  1.1 (H) (05/18/17)  0.0 (05/18/17)  0.0    (05/17/17)  52 (05/17/17)  33 (05/17/17)  14 (05/17/17)  0 (05/17/17)  1  (05/17/17)  3.8 (05/17/17)  2.4 (05/17/17)  1.0 (05/17/17)  0.0 Visit [employer]  You can access your MyChart to more actively manage your health care and view more details from this visit by going to [email or web address].   If you've recently had a stay at the Hospital you can access your discharge instructions i diphenoxylate-atropine 2.5-0.025 MG Oral Tab    Loperamide HCl 2 MG Oral Cap    Calcium Carbonate Antacid 500 MG Oral Chew Tab    Pantoprazole Sodium 40 MG Oral Tab EC    sucralfate (CARAFATE) 1 GM/10ML Oral Suspension    Ondansetron HCl (ZOFRAN) 8 MG tab